# Patient Record
Sex: FEMALE | Race: WHITE | Employment: FULL TIME | URBAN - METROPOLITAN AREA
[De-identification: names, ages, dates, MRNs, and addresses within clinical notes are randomized per-mention and may not be internally consistent; named-entity substitution may affect disease eponyms.]

---

## 2017-01-03 ENCOUNTER — MYC REFILL (OUTPATIENT)
Dept: FAMILY MEDICINE | Facility: OTHER | Age: 37
End: 2017-01-03

## 2017-01-03 DIAGNOSIS — F90.2 ATTENTION DEFICIT HYPERACTIVITY DISORDER (ADHD), COMBINED TYPE: ICD-10-CM

## 2017-01-03 RX ORDER — DEXTROAMPHETAMINE SACCHARATE, AMPHETAMINE ASPARTATE MONOHYDRATE, DEXTROAMPHETAMINE SULFATE AND AMPHETAMINE SULFATE 7.5; 7.5; 7.5; 7.5 MG/1; MG/1; MG/1; MG/1
30 CAPSULE, EXTENDED RELEASE ORAL DAILY
Qty: 30 CAPSULE | Refills: 0 | Status: SHIPPED | OUTPATIENT
Start: 2017-01-03 | End: 2017-02-09

## 2017-01-03 NOTE — TELEPHONE ENCOUNTER
Message from [place]:  Original authorizing provider: MD Jimena Beard would like a refill of the following medications:  amphetamine-dextroamphetamine (ADDERALL XR) 30 MG per capsule [Ramon Raphael MD]    Preferred pharmacy: [facility] - [facility] [address]    Comment:

## 2017-01-03 NOTE — TELEPHONE ENCOUNTER
Adderall XR      Last Written Prescription Date:  11/08/2016  Last Fill Quantity: 30,   # refills: 0  Last [facility] Visit with [facility] provider: 08/08/2016  Future Office visit:       Routing refill request to provider for review/approval because:  Drug not on the [facility] or  [facility] refill protocol or controlled substance    Luz Maria Sosa MA

## 2017-01-10 ENCOUNTER — TELEPHONE (OUTPATIENT)
Dept: FAMILY MEDICINE | Facility: OTHER | Age: 37
End: 2017-01-10

## 2017-01-10 NOTE — TELEPHONE ENCOUNTER
Please change medication or advise of any information that might help get this approved.    Thanks    Received PA request for: adderalNativeAD  Insurance co: [employer] phone: [phone] option 3  Member ID#: [id number]    PA filled out and faxed awaiting approval/denial

## 2017-02-09 ENCOUNTER — OFFICE VISIT (OUTPATIENT)
Dept: FAMILY MEDICINE | Facility: OTHER | Age: 37
End: 2017-02-09
Payer: COMMERCIAL

## 2017-02-09 VITALS
DIASTOLIC BLOOD PRESSURE: 70 MMHG | TEMPERATURE: 97.8 F | SYSTOLIC BLOOD PRESSURE: 116 MMHG | HEART RATE: 106 BPM | HEIGHT: 65 IN | RESPIRATION RATE: 20 BRPM | OXYGEN SATURATION: 100 % | WEIGHT: 185.4 LBS | BODY MASS INDEX: 30.89 KG/M2

## 2017-02-09 DIAGNOSIS — F41.1 GENERALIZED ANXIETY DISORDER: ICD-10-CM

## 2017-02-09 DIAGNOSIS — F41.9 ANXIETY: ICD-10-CM

## 2017-02-09 DIAGNOSIS — F90.2 ATTENTION DEFICIT HYPERACTIVITY DISORDER (ADHD), COMBINED TYPE: Primary | ICD-10-CM

## 2017-02-09 PROCEDURE — 99213 OFFICE O/P EST LOW 20 MIN: CPT | Performed by: NURSE PRACTITIONER

## 2017-02-09 RX ORDER — DEXTROAMPHETAMINE SACCHARATE, AMPHETAMINE ASPARTATE MONOHYDRATE, DEXTROAMPHETAMINE SULFATE AND AMPHETAMINE SULFATE 7.5; 7.5; 7.5; 7.5 MG/1; MG/1; MG/1; MG/1
30 CAPSULE, EXTENDED RELEASE ORAL DAILY
Qty: 30 CAPSULE | Refills: 0 | Status: SHIPPED | OUTPATIENT
Start: 2017-02-09 | End: 2017-03-20

## 2017-02-09 RX ORDER — ALPRAZOLAM 0.5 MG
0.5 TABLET ORAL PRN
Qty: 30 TABLET | Refills: 0 | Status: SHIPPED | OUTPATIENT
Start: 2017-02-09 | End: 2017-08-16

## 2017-02-09 NOTE — PROGRESS NOTES
SUBJECTIVE:                                                    Jimena Moreira is a 36 year old female who presents to clinic today for the following health issues:      Medication Followup of Adderall and Xanax    Taking Medication as prescribed: yes    Side Effects:  None    Medication Helping Symptoms:  yes     ADHD follow up.  Normally sees Dr. Raphael, but he is out this month.  Dose has been stable.  Symptoms well controlled.  She uses the Xanax very rarely, last filled in April of last year.    Problem list and histories reviewed & adjusted, as indicated.  Additional history: none    Patient Active Problem List   Diagnosis     CARDIOVASCULAR SCREENING; LDL GOAL LESS THAN 160     SLE (systemic lupus erythematosus) (H)     Sicca (H)     High risk medication use     Anxiety     Generalized anxiety disorder     Difficulty concentrating     Attention deficit     Generalized anxiety disorder     Attention deficit hyperactivity disorder (ADHD)     Past Surgical History   Procedure Laterality Date     Cholecystectomy       Biopsy         Social History   Substance Use Topics     Smoking status: Former Smoker     Quit date: 2013     Smokeless tobacco: Never Used      Comment: social     Alcohol Use: Yes      Comment: occ     Family History   Problem Relation Age of Onset     Hypertension Father      CANCER Paternal Aunt      Cardiovascular Paternal Uncle      CHF,  in sleep     Hypertension Brother      hypertension, Etoh use     DIABETES Paternal Grandmother      Hyperlipidemia Brother      Breast Cancer Mother      Other Cancer Maternal Grandfather 87     Lymphoma         Current Outpatient Prescriptions   Medication Sig Dispense Refill     amphetamine-dextroamphetamine (ADDERALL XR) 30 MG per 24 hr capsule Take 1 capsule (30 mg) by mouth daily 30 capsule 0     venlafaxine (EFFEXOR-XR) 75 MG 24 hr capsule TAKE THREE CAPSULES BY MOUTH EVERY  capsule 2     ALPRAZolam (XANAX) 0.5 MG tablet Take 1  "tablet (0.5 mg) by mouth as needed for anxiety 1-2 tabs as needed 30 tablet 0     etonogestrel-ethinyl estradiol (NUVARING) 0.12-0.015 MG/24HR vaginal ring Place 1 each vaginally every 28 days 3 each 2     omeprazole 20 MG tablet Take 1 tablet (20 mg) by mouth daily Take 30-60 minutes before a meal. 90 tablet 1     hydrOXYzine (ATARAX) 25 MG tablet Take 1-2 tablets (25-50 mg) by mouth every 6 hours as needed for anxiety 60 tablet 1     ibuprofen (ADVIL,MOTRIN) 800 MG tablet Take 1 tablet (800 mg) by mouth every 8 hours as needed for pain 90 tablet 1     hydroxychloroquine (PLAQUENIL) 200 MG tablet Take 1 tablet (200 mg) by mouth 2 times daily 60 tablet 6     [DISCONTINUED] amphetamine-dextroamphetamine (ADDERALL XR) 30 MG per capsule Take 1 capsule (30 mg) by mouth daily 30 capsule 0     [DISCONTINUED] amphetamine-dextroamphetamine (ADDERALL XR) 30 MG per capsule Take 1 capsule (30 mg) by mouth daily 30 capsule 0     Allergies   Allergen Reactions     Zoloft [Sertraline] Anxiety     Cliches jaw, Increased anxiety     BP Readings from Last 3 Encounters:   02/09/17 116/70   08/08/16 110/70   04/05/16 110/70    Wt Readings from Last 3 Encounters:   02/09/17 185 lb 6.4 oz (84.097 kg)   08/08/16 175 lb 9.6 oz (79.652 kg)   04/05/16 167 lb 12.8 oz (76.114 kg)           ROS:  C: NEGATIVE for fever, chills, change in weight  E/M: NEGATIVE for ear, mouth and throat problems  R: NEGATIVE for significant cough or SOB  CV: NEGATIVE for chest pain, palpitations or peripheral edema    OBJECTIVE:                                                    /70 mmHg  Pulse 106  Temp(Src) 97.8  F (36.6  C) (Tympanic)  Resp 20  Ht 5' 4.5\" (1.638 m)  Wt 185 lb 6.4 oz (84.097 kg)  BMI 31.34 kg/m2  SpO2 100%  LMP 01/02/2017  Body mass index is 31.34 kg/(m^2).  GENERAL: healthy, alert and no distress  NECK: no adenopathy, no asymmetry, masses, or scars and thyroid normal to palpation  RESP: lungs clear to auscultation - no rales, " rhonchi or wheezes  CV: regular rate and rhythm, normal S1 S2, no S3 or S4, no murmur, click or rub, no peripheral edema and peripheral pulses strong  MS: no gross musculoskeletal defects noted, no edema    Diagnostic Test Results:  none      ASSESSMENT/PLAN:                                                        1. Attention deficit hyperactivity disorder (ADHD), combined type  Further refills from PCP.   - amphetamine-dextroamphetamine (ADDERALL XR) 30 MG per 24 hr capsule; Take 1 capsule (30 mg) by mouth daily  Dispense: 30 capsule; Refill: 0    2. Generalized anxiety disorder  3. Anxiety  - ALPRAZolam (XANAX) 0.5 MG tablet; Take 1 tablet (0.5 mg) by mouth as needed for anxiety 1-2 tabs as needed  Dispense: 30 tablet; Refill: 0    FUTURE APPOINTMENTS:       - Follow-up visit in 1 month with PCP.   See Patient Instructions    [facility]

## 2017-02-09 NOTE — NURSING NOTE
"Chief Complaint   Patient presents with     Recheck Medication     refills needed of adderall and xanax       Initial /70 mmHg  Pulse 106  Temp(Src) 97.8  F (36.6  C) (Tympanic)  Resp 20  Ht 5' 4.5\" (1.638 m)  Wt 185 lb 6.4 oz (84.097 kg)  BMI 31.34 kg/m2  SpO2 100%  LMP 01/02/2017 Estimated body mass index is 31.34 kg/(m^2) as calculated from the following:    Height as of this encounter: 5' 4.5\" (1.638 m).    Weight as of this encounter: 185 lb 6.4 oz (84.097 kg).  Medication Reconciliation: complete   ................César Granados LPN,   February 9, 2017,      8:02 AM,   [facility]      "

## 2017-02-09 NOTE — PATIENT INSTRUCTIONS
I refilled your Adderall and Xanax.      Follow up with Dr. Raphael next month for further refills.

## 2017-02-09 NOTE — MR AVS SNAPSHOT
After Visit Summary   2/9/2017    Jimena Moreira    MRN: [id number]           Patient Information     Date Of Birth          1980        Visit Information        Provider Department      2/9/2017 7:40 AM Zofia Bucio APRN [facility]        Today's Diagnoses     Attention deficit hyperactivity disorder (ADHD), combined type    -  1     Generalized anxiety disorder         Anxiety           Care Instructions    I refilled your Adderall and Xanax.      Follow up with Dr. Raphael next month for further refills.               Follow-ups after your visit        Who to contact     If you have questions or need follow up information about today's clinic visit or your schedule please contact [facility] directly at [phone].  Normal or non-critical lab and imaging results will be communicated to you by MyChart, letter or phone within 4 business days after the clinic has received the results. If you do not hear from us within 7 days, please contact the clinic through [employer] or phone. If you have a critical or abnormal lab result, we will notify you by phone as soon as possible.  Submit refill requests through [employer] or call your pharmacy and they will forward the refill request to us. Please allow 3 business days for your refill to be completed.          Additional Information About Your Visit        MyChart Information     [employer] gives you secure access to your electronic health record. If you see a primary care provider, you can also send messages to your care team and make appointments. If you have questions, please call your primary care clinic.  If you do not have a primary care provider, please call [phone] and they will assist you.        Care EveryWhere ID     This is your Care EveryWhere ID. This could be used by other organizations to access your [place] medical records  [id number]        Your Vitals Were     Pulse Temperature Respirations    106  "97.8  F (36.6  C) (Tympanic) 20    Height BMI (Body Mass Index) Pulse Oximetry    5' 4.5\" (1.638 m) 31.34 kg/m2 100%    Last Period          01/02/2017         Blood Pressure from Last 3 Encounters:   02/09/17 116/70   08/08/16 110/70   04/05/16 110/70    Weight from Last 3 Encounters:   02/09/17 185 lb 6.4 oz (84.097 kg)   08/08/16 175 lb 9.6 oz (79.652 kg)   04/05/16 167 lb 12.8 oz (76.114 kg)              Today, you had the following     No orders found for display         Where to get your medicines      Some of these will need a paper prescription and others can be bought over the counter.  Ask your nurse if you have questions.     Bring a paper prescription for each of these medications    - ALPRAZolam 0.5 MG tablet  - amphetamine-dextroamphetamine 30 MG per 24 hr capsule       Primary Care Provider Office Phone # Fax #    Ramon Raphael [phone]       [facility] [address] [address]        Thank you!     Thank you for choosing [facility]  for your care. Our goal is always to provide you with excellent care. Hearing back from our patients is one way we can continue to improve our services. Please take a few minutes to complete the written survey that you may receive in the mail after your visit with us. Thank you!             Your Updated Medication List - Protect others around you: Learn how to safely use, store and throw away your medicines at www.disposemymeds.org.          This list is accurate as of: 2/9/17  8:09 AM.  Always use your most recent med list.                   Brand Name Dispense Instructions for use    ALPRAZolam 0.5 MG tablet    XANAX    30 tablet    Take 1 tablet (0.5 mg) by mouth as needed for anxiety 1-2 tabs as needed       amphetamine-dextroamphetamine 30 MG per 24 hr capsule    ADDERALL XR    30 capsule    Take 1 capsule (30 mg) by mouth daily       etonogestrel-ethinyl estradiol 0.12-0.015 MG/24HR vaginal ring    NUVARING "    3 each    Place 1 each vaginally every 28 days       hydroxychloroquine 200 MG tablet    PLAQUENIL    60 tablet    Take 1 tablet (200 mg) by mouth 2 times daily       hydrOXYzine 25 MG tablet    ATARAX    60 tablet    Take 1-2 tablets (25-50 mg) by mouth every 6 hours as needed for anxiety       ibuprofen 800 MG tablet    ADVIL/MOTRIN    90 tablet    Take 1 tablet (800 mg) by mouth every 8 hours as needed for pain       omeprazole 20 MG tablet     90 tablet    Take 1 tablet (20 mg) by mouth daily Take 30-60 minutes before a meal.       venlafaxine 75 MG 24 hr capsule    EFFEXOR-XR    270 capsule    TAKE THREE CAPSULES BY MOUTH EVERY DAY

## 2017-03-20 ENCOUNTER — MYC MEDICAL ADVICE (OUTPATIENT)
Dept: FAMILY MEDICINE | Facility: OTHER | Age: 37
End: 2017-03-20

## 2017-03-20 DIAGNOSIS — F90.2 ATTENTION DEFICIT HYPERACTIVITY DISORDER (ADHD), COMBINED TYPE: ICD-10-CM

## 2017-03-20 RX ORDER — DEXTROAMPHETAMINE SACCHARATE, AMPHETAMINE ASPARTATE MONOHYDRATE, DEXTROAMPHETAMINE SULFATE AND AMPHETAMINE SULFATE 7.5; 7.5; 7.5; 7.5 MG/1; MG/1; MG/1; MG/1
30 CAPSULE, EXTENDED RELEASE ORAL DAILY
Qty: 30 CAPSULE | Refills: 0 | Status: SHIPPED | OUTPATIENT
Start: 2017-03-20 | End: 2017-05-02

## 2017-03-20 NOTE — TELEPHONE ENCOUNTER
Tried calling, no answer. Will mail Rx to pharmacy. Informed pt via [employer].  ................César Granados LPN,   March 20, 2017,      4:08 PM,   [facility]

## 2017-03-20 NOTE — TELEPHONE ENCOUNTER
adderall      Last Written Prescription Date:  2/9/17  Last Fill Quantity: 30,   # refills: 0  [facility]: 2/9/17  Future Office visit:       Routing refill request to provider for review/approval because:  Drug not on the [facility] refill [facility] or controlled substance  Luiza Coulter MA     3/20/2017

## 2017-03-20 NOTE — TELEPHONE ENCOUNTER
Since I just saw her last month, I will just refill this x 1.  Please make an appointment with Dr. Raphael as soon as you can.  Please annabelle up refill request for me.     Thanks.    Electronically signed by Zofia Bucio CNP.

## 2017-04-28 NOTE — PROGRESS NOTES
SUBJECTIVE:                                                    Jimena Moreira is a 37 year old female who presents to clinic today for the following health issues:      SUBJECTIVE:  Patient is here today to recheck ADHD/ADD.    Updates since last visit: Rash ongoing, Was told it was psoriasis,   Routine for taking medicine, including time: 6AM  Time medicine wears off: 3 maybe  Issues at school/Work: None  Issues at home: none  Control of symptoms: Good     Side effects:  Headaches: No  Stomach aches: No  Irritability/mood swings: No  Difficulties with sleep: No  Social withdrawal: No  Unusual movements/tics: No  Decreased appetite: No    Other concerns: Rash        Amount of exercise or physical activity: 4-5 days/week for an average of 30-45 minutes    Problems taking medications regularly: No    Medication side effects: none    Diet: regular (no restrictions)      Continues to have rash was told it was psoriasis and is looking for options.       PROBLEMS TO ADD ON...    Problem list and histories reviewed & adjusted, as indicated.  Additional history: as documented    Current Outpatient Prescriptions   Medication Sig Dispense Refill     triamcinolone (KENALOG) 0.1 % cream Apply sparingly to affected area two times daily as needed 453.6 g 0     omeprazole 20 MG tablet Take 1 tablet (20 mg) by mouth daily Take 30-60 minutes before a meal. 90 tablet 1     [START ON 7/2/2017] amphetamine-dextroamphetamine (ADDERALL XR) 30 MG per 24 hr capsule Take 1 capsule (30 mg) by mouth daily 30 capsule 0     venlafaxine (EFFEXOR-XR) 75 MG 24 hr capsule TAKE THREE CAPSULES BY MOUTH EVERY  capsule 2     ALPRAZolam (XANAX) 0.5 MG tablet Take 1 tablet (0.5 mg) by mouth as needed for anxiety 1-2 tabs as needed 30 tablet 0     etonogestrel-ethinyl estradiol (NUVARING) 0.12-0.015 MG/24HR vaginal ring Place 1 each vaginally every 28 days 3 each 2     hydrOXYzine (ATARAX) 25 MG tablet Take 1-2 tablets (25-50 mg) by mouth every  6 hours as needed for anxiety 60 tablet 1     ibuprofen (ADVIL,MOTRIN) 800 MG tablet Take 1 tablet (800 mg) by mouth every 8 hours as needed for pain 90 tablet 1     hydroxychloroquine (PLAQUENIL) 200 MG tablet Take 1 tablet (200 mg) by mouth 2 times daily 60 tablet 6     [DISCONTINUED] amphetamine-dextroamphetamine (ADDERALL XR) 30 MG per 24 hr capsule Take 1 capsule (30 mg) by mouth daily 30 capsule 0     [DISCONTINUED] amphetamine-dextroamphetamine (ADDERALL XR) 30 MG per 24 hr capsule Take 1 capsule (30 mg) by mouth daily 30 capsule 0     [DISCONTINUED] amphetamine-dextroamphetamine (ADDERALL XR) 30 MG per 24 hr capsule Take 1 capsule (30 mg) by mouth daily 30 capsule 0     [DISCONTINUED] venlafaxine (EFFEXOR-XR) 75 MG 24 hr capsule TAKE THREE CAPSULES BY MOUTH EVERY  capsule 2     Allergies   Allergen Reactions     No Clinical Screening - See Comments Dermatitis     Zoloft [Sertraline] Anxiety     Cliches jaw, Increased anxiety       Reviewed and updated as needed this visit by clinical staff       Reviewed and updated as needed this visit by Provider         ROS:  Constitutional, HEENT, cardiovascular, pulmonary, gi and gu systems are negative, except as otherwise noted.    OBJECTIVE:                                                    /62 (Cuff Size: Adult Regular)  Pulse 86  Temp 98.5  F (36.9  C) (Temporal)  Resp 18  Wt 182 lb (82.6 kg)  BMI 30.76 kg/m2  Body mass index is 30.76 kg/(m^2).  GENERAL: healthy, alert and no distress  SKIN: patient showed picture of her Rash,     Examination of the rash reveals:glut fold b/l, posteriorly, well-defined dry violaceous plaques.    NEURO: Normal strength and tone, mentation intact and speech normal  PSYCH: mentation appears normal, affect normal/bright    Care everywhere seen, request patient to have results faxed for scanning.    Skin Biopsy:    Comment:  [facility]  Surgical Pathology Laboratory  [facility] [place]   [address]  Tel: [phone]  Fax:  [phone]    SURGICAL PATHOLOGY REPORT    Patient Name: CATY CHERY Specimen No: [id number] Room No:  OP ERP Age:  36 Sex: F Taken: 2016 Med. Rec. #: [id number] :  1980 Received: 2016 Physician(s): Buck Dash PA-C   Service:  Reported: 2016   Encounter No: [id number]       FINAL DIAGNOSIS    Leg, biopsy - Psoriasiform dermatitis with spongiosis and  eosinophils (see description).                                                                                                                                                                                                               Electronically Signed Out   Tahira Vale MD   amp    ____________________________________________      Gross  Leg:  A 0.6 cm in diameter tan-pink grossly unremarkable skin  punch, excised to a depth of 0.4 cm.  The resection margin is  inked black, specimen is bisected.  IT-1  (JR)     MICROSCOPIC  Sections show skin with psoriasiform hyperplasia, focal  parakeratosis with serous crust and spongiosis associated with  lymphocytes and neutrophils.  There are also pockets of  neutrophils in the upper epidermis and keratin layer.  Within the  dermis there is perivascular chronic inflammation consisting of  lymphocytes with a few eosinophils and rare plasma cells.  The  perivascular inflammation is superficial and deep.  There is no  vasculitis.  The findings are consistent with subacute dermatitis  with a differential diagnosis of chronic atopic dermatitis,  chronic allergic contact dermatitis and psoriasis. Given the  patient' s complicating history of lupus and skin lesions  refractory to treatment, the case is sent in consultation to the  [facility] dermatopathology group for a more specific diagnosis.   Their diagnosis is psoriasiform hyperplasia, spongiform pustule,  hypogranulosis and suprapapillary thinning most consistent  with  psoriasis.  Clinical correlation is recommended.       CPT CODES  A: [id number]    Copy from care everywhere, waiting for results to be faxed.       ASSESSMENT/PLAN:                                                        ICD-10-CM    1. Psoriasis L40.9 triamcinolone (KENALOG) 0.1 % cream     DERMATOLOGY REFERRAL   2. Gastroesophageal reflux disease without esophagitis K21.9 omeprazole 20 MG tablet   3. Attention deficit hyperactivity disorder (ADHD), combined type F90.2 amphetamine-dextroamphetamine (ADDERALL XR) 30 MG per 24 hr capsule     DISCONTINUED: amphetamine-dextroamphetamine (ADDERALL XR) 30 MG per 24 hr capsule     DISCONTINUED: amphetamine-dextroamphetamine (ADDERALL XR) 30 MG per 24 hr capsule   4. Anxiety F41.9 venlafaxine (EFFEXOR-XR) 75 MG 24 hr capsule   5. History of systemic lupus erythematosus Z87.39 DERMATOLOGY REFERRAL     Orders Placed This Encounter   Procedures     DERMATOLOGY REFERRAL     Important for patient to follow up with dermatologist with current hx of SLE and seeing Rheumatologist.  The patient understood the rational for the diagnosis and treatment plan. All questions were answered to best of my ability and the patient's satisfaction.  I have reviewed all pertinent investigations including labs and imaging including outside records if relevent.  Risks, benefits and alternatives of treatments discussed. Plan agreed on.  Followup: if not better.  Will call, return to clinic, or go to ED if worsening or symptoms not improving as discussed.  See patient instructions.     Patient Instructions       What is Psoriasis?  Psoriasis is a chronic skin disease. Psoriasis can begin at any age. It is most common between ages 30 and 39 and also between ages 50 and 69. Psoriasis affects nearly equal numbers of men and women. In people with this disease, the skin grows too fast. Dead skin cells build up on the skin s surface to form inflamed, thick, silvery scales called plaques. Sometimes people  form many small lesions that can hurt or have pus in them. Psoriasis does not spread from person to person.     About your symptoms  Psoriasis plaques tend to form on the elbows, knees, scalp, navel, arms, legs, and the penis or vulva. They can be unsightly, painful, and itchy. Plaques on the joints can limit movement. On the fingernails or toenails, psoriasis can cause pitting, a change in nail color, and a change in nail shape. In some cases, psoriasis also causes symptoms that are like arthritis. Symptoms may come and go on their own. Factors such as stress, infection, and certain medications may cause flare-ups. If symptoms bother you, know that many effective medical treatments exist  that can help relieve symptoms for most people with psoriasis. Discuss your treatment options with your health care provider.  External medical treatments  There are many types of external medical treatments that are used to treat the affected skin lesions. Your health care provider may prescribe one of many types of topical medications. These include strong topical steroids or steroid-sparing agents. You apply them to your skin on a regular basis. Coal tar (a thick black liquid) may be applied to thick plaques. In some cases, the skin may be exposed to a special light in the health care provider's office. Or, you can expose the psoriatic plaques to short periods (5 minutes) of natural sun as directed by your health care provider. This method is called phototherapy. It can be enhanced with the use of psoralen (a type of medication).  Internal medical treatments  Internal treatments are taken orally (by mouth) or given by injection. There are a number of oral medications for more severe psoriasis. Your health care provider can tell you more about these treatments.    5144-9413 The [employer]. [address] [place] [address], [place] [address]. All rights reserved. This information is not intended as a substitute for professional  medical care. Always follow your healthcare professional's instructions.        Managing Psoriasis  The success of your medical treatment depends on you. When your health care provider gives you a treatment plan, ask when you should expect to see results. Then, follow your plan. If your treatment does not work in the expected time, let your health care provider know. Psoriasis is a common disease, and it can respond to many different treatments. It depends on the location, size, and symptoms each person experiences. Some treatments are simple (tar-based therapies or topical steroids), and some are complex (new biologic medications or light therapy). Your health care provider will need to personalize your treatment. Psoriasis frequently will get better with treatment, but later worsen if you stop treatment or if a new illness ocurs. In most cases, you can get control of your psoriasis again. You will likely need to see your health care provider regularly about treatment options.     Take baths in warm water to help soften scales.   Psoriasis self-care  Follow these steps to help manage your symptoms:    Take baths to help soften scales. Use warm, not hot, water. To avoid drying out your skin, limit each bath to about 15 minutes. Add bath oil or [place] salts.    After you bathe, apply lotion right away, while your skin is damp. Dry skin can make symptoms worse.    Use a scalp treatment as prescribed by your health care provider. There are different solutions and dosages based on your symptoms.     Promptly seek treatment for any skin injuries because they can cause flare-ups.    Manage your stress, and use relaxation techniques.    Expose your psoriatic skin to sunlight for 5 minutes a day, except if you feel that sun exposure makes your psoriasis worse. Use sunscreen on the normal, unaffected skin, but avoid sunburns.    Use over-the-counter hydrocortisone cream for itching to reduce scaling for active  outbreaks. Consult with your health care provider for long-term use.    Stick with treatment that your health care provider has recommended for you, especially if it's controlling your psoriasis.    Avoid abrasive cleansers, harsh detergents, and household chemicals.  Getting good results  Now that you know more about psoriasis, the next step is up to you. Follow your health care provider's treatment plan and self-care routine. Doing so can help you control your symptoms. If your symptoms don t improve or they get worse, call your health care provider. Psoriasis can t be cured. But its symptoms can be managed.     0666-4338 [employer]. [address] [place] [address]. All rights reserved. This information is not intended as a substitute for professional medical care. Always follow your healthcare professional's instructions.        Psoriasis  Psoriasis is an inflammatory condition that affects the skin and nails. You may have patches of thick, red skin (plaques) covered with silvery scales. These usually appear on the elbows, knees, legs, lower back, and scalp.  The plaques itch and can be painful. There may also be psychological and emotional distress.  Psoriasis is not contagious but can be inherited. The latest research shows that this may be an immune disorder. The immune system reacts to healthy skin like it is a foreign substance. This causes skin cells to grow faster than normal and to stack up in raised red patches. The disease is chronic with periods of flares and remissions.  Smoking, sun exposure, and alcohol use may affect how often the psoriasis occurs and how long the flares last.  There is no cure, but treatments can offer relief. Treatment consists of a combination of topical creams, light therapy (phototherapy), and oral medicines.  Home care  The following guidelines can help you care for yourself at home:    No specific diet is required. Eat a healthy, well-balanced diet  that includes fresh fruits and vegetables, whole grains, and lean meats. Psoriasis can increase the risk for diabetes and heart disease.    Increasing omega-3 fatty acids in your diet can help improve dry skin. The best dietary sources are fatty fish (salmon, mackerel, lake trout, albacore tuna) or fish oil (such as cod liver oil). A great way to take fish oil is to add it to a juice, shake, or smoothie. Flaxseeds and flaxseed oil, canola oil, walnuts, soybean, and tofu are converted to omega-3 fatty acid in the body.    Maintain a healthy weight. Overlapping skin folds can be a site for psoriasis plaques. If you are overweight, talk to your doctor about a weight-loss program.    Bathing daily can help remove scales and calm inflamed skin. Use lukewarm water and mild soaps that have added oils, fats, and moisturizers. Avoid deodorants, antiperspirants, and antibacterial soaps, as these have a drying effect. Many find that soaking in a tub with added bath oils, oatmeal, apple cider vinegar, or Epsom salts is helpful.    After bathing, put on moisturizing cream (or a skin oil for a stronger effect).    Some exposure to UV rays from the sun can improve psoriasis, but too much can trigger an outbreak. It also increases your risk for skin cancer. Limit sun exposure and use sunscreen on healthy skin (at least 15 SPF).    If you are prescribed medication, take it as directed.    Unless another steroid cream was prescribed, you may use over-the-counter hydrocortisone cream for a few weeks during symptom flare-ups.    Stop smoking. If you are a long-time smoker, this can be hard. Consider enrolling in a stop-smoking program.    Tell your doctor if your joints start to ache or get stiff.    Tell your doctor if you notice changes in your fingernails.    Depression is more common among psoriasis patients. Get help if you notice changes in your mood.  Follow-up care  Follow up with your doctor or as advised by our staff.  When  to seek medical care  Get prompt medical attention if any of the following occur:    Skin pain gets worse    Bleeding from the skin plaques that is hard to control    Signs of skin infection (redness, increasing pain, swelling, pus)    Fever of 100.4 F (38 C) or higher, or as directed by your health care provider    1429-5394 [employer]. [address] [place] [address]. All rights reserved. This information is not intended as a substitute for professional medical care. Always follow your healthcare professional's instructions.            Ramon Raphale MD  [facility]

## 2017-05-02 ENCOUNTER — OFFICE VISIT (OUTPATIENT)
Dept: FAMILY MEDICINE | Facility: OTHER | Age: 37
End: 2017-05-02
Payer: COMMERCIAL

## 2017-05-02 ENCOUNTER — TELEPHONE (OUTPATIENT)
Dept: FAMILY MEDICINE | Facility: OTHER | Age: 37
End: 2017-05-02

## 2017-05-02 VITALS
BODY MASS INDEX: 30.76 KG/M2 | SYSTOLIC BLOOD PRESSURE: 100 MMHG | TEMPERATURE: 98.5 F | DIASTOLIC BLOOD PRESSURE: 62 MMHG | WEIGHT: 182 LBS | RESPIRATION RATE: 18 BRPM | HEART RATE: 86 BPM

## 2017-05-02 DIAGNOSIS — F41.9 ANXIETY: ICD-10-CM

## 2017-05-02 DIAGNOSIS — F90.2 ATTENTION DEFICIT HYPERACTIVITY DISORDER (ADHD), COMBINED TYPE: ICD-10-CM

## 2017-05-02 DIAGNOSIS — M32.9 HISTORY OF SYSTEMIC LUPUS ERYTHEMATOSUS (H): ICD-10-CM

## 2017-05-02 DIAGNOSIS — L40.9 PSORIASIS: Primary | ICD-10-CM

## 2017-05-02 DIAGNOSIS — K21.9 GASTROESOPHAGEAL REFLUX DISEASE WITHOUT ESOPHAGITIS: ICD-10-CM

## 2017-05-02 PROCEDURE — 99213 OFFICE O/P EST LOW 20 MIN: CPT | Performed by: FAMILY MEDICINE

## 2017-05-02 RX ORDER — DEXTROAMPHETAMINE SACCHARATE, AMPHETAMINE ASPARTATE MONOHYDRATE, DEXTROAMPHETAMINE SULFATE AND AMPHETAMINE SULFATE 7.5; 7.5; 7.5; 7.5 MG/1; MG/1; MG/1; MG/1
30 CAPSULE, EXTENDED RELEASE ORAL DAILY
Qty: 30 CAPSULE | Refills: 0 | Status: SHIPPED | OUTPATIENT
Start: 2017-06-02 | End: 2017-05-02

## 2017-05-02 RX ORDER — VENLAFAXINE HYDROCHLORIDE 75 MG/1
CAPSULE, EXTENDED RELEASE ORAL
Qty: 270 CAPSULE | Refills: 2 | Status: SHIPPED | OUTPATIENT
Start: 2017-05-02 | End: 2018-05-21

## 2017-05-02 RX ORDER — NICOTINE POLACRILEX 4 MG/1
20 GUM, CHEWING ORAL DAILY
Qty: 90 TABLET | Refills: 1 | Status: SHIPPED | OUTPATIENT
Start: 2017-05-02 | End: 2017-08-25

## 2017-05-02 RX ORDER — DEXTROAMPHETAMINE SACCHARATE, AMPHETAMINE ASPARTATE MONOHYDRATE, DEXTROAMPHETAMINE SULFATE AND AMPHETAMINE SULFATE 7.5; 7.5; 7.5; 7.5 MG/1; MG/1; MG/1; MG/1
30 CAPSULE, EXTENDED RELEASE ORAL DAILY
Qty: 30 CAPSULE | Refills: 0 | Status: SHIPPED | OUTPATIENT
Start: 2017-05-02 | End: 2017-05-02

## 2017-05-02 RX ORDER — DEXTROAMPHETAMINE SACCHARATE, AMPHETAMINE ASPARTATE MONOHYDRATE, DEXTROAMPHETAMINE SULFATE AND AMPHETAMINE SULFATE 7.5; 7.5; 7.5; 7.5 MG/1; MG/1; MG/1; MG/1
30 CAPSULE, EXTENDED RELEASE ORAL DAILY
Qty: 30 CAPSULE | Refills: 0 | Status: SHIPPED | OUTPATIENT
Start: 2017-07-02 | End: 2017-08-25

## 2017-05-02 RX ORDER — TRIAMCINOLONE ACETONIDE 1 MG/G
CREAM TOPICAL
Qty: 453.6 G | Refills: 0 | Status: SHIPPED | OUTPATIENT
Start: 2017-05-02 | End: 2019-09-23

## 2017-05-02 ASSESSMENT — PAIN SCALES - GENERAL: PAINLEVEL: NO PAIN (0)

## 2017-05-02 NOTE — PATIENT INSTRUCTIONS
What is Psoriasis?  Psoriasis is a chronic skin disease. Psoriasis can begin at any age. It is most common between ages 30 and 39 and also between ages 50 and 69. Psoriasis affects nearly equal numbers of men and women. In people with this disease, the skin grows too fast. Dead skin cells build up on the skin s surface to form inflamed, thick, silvery scales called plaques. Sometimes people form many small lesions that can hurt or have pus in them. Psoriasis does not spread from person to person.     About your symptoms  Psoriasis plaques tend to form on the elbows, knees, scalp, navel, arms, legs, and the penis or vulva. They can be unsightly, painful, and itchy. Plaques on the joints can limit movement. On the fingernails or toenails, psoriasis can cause pitting, a change in nail color, and a change in nail shape. In some cases, psoriasis also causes symptoms that are like arthritis. Symptoms may come and go on their own. Factors such as stress, infection, and certain medications may cause flare-ups. If symptoms bother you, know that many effective medical treatments exist  that can help relieve symptoms for most people with psoriasis. Discuss your treatment options with your health care provider.  External medical treatments  There are many types of external medical treatments that are used to treat the affected skin lesions. Your health care provider may prescribe one of many types of topical medications. These include strong topical steroids or steroid-sparing agents. You apply them to your skin on a regular basis. Coal tar (a thick black liquid) may be applied to thick plaques. In some cases, the skin may be exposed to a special light in the health care provider's office. Or, you can expose the psoriatic plaques to short periods (5 minutes) of natural sun as directed by your health care provider. This method is called phototherapy. It can be enhanced with the use of psoralen (a type of medication).  Internal  medical treatments  Internal treatments are taken orally (by mouth) or given by injection. There are a number of oral medications for more severe psoriasis. Your health care provider can tell you more about these treatments.    7381-3339 [employer]. [address] [place] [address], [place] [address]. All rights reserved. This information is not intended as a substitute for professional medical care. Always follow your healthcare professional's instructions.        Managing Psoriasis  The success of your medical treatment depends on you. When your health care provider gives you a treatment plan, ask when you should expect to see results. Then, follow your plan. If your treatment does not work in the expected time, let your health care provider know. Psoriasis is a common disease, and it can respond to many different treatments. It depends on the location, size, and symptoms each person experiences. Some treatments are simple (tar-based therapies or topical steroids), and some are complex (new biologic medications or light therapy). Your health care provider will need to personalize your treatment. Psoriasis frequently will get better with treatment, but later worsen if you stop treatment or if a new illness ocurs. In most cases, you can get control of your psoriasis again. You will likely need to see your health care provider regularly about treatment options.     Take baths in warm water to help soften scales.   Psoriasis self-care  Follow these steps to help manage your symptoms:    Take baths to help soften scales. Use warm, not hot, water. To avoid drying out your skin, limit each bath to about 15 minutes. Add bath oil or [place] Sea salts.    After you bathe, apply lotion right away, while your skin is damp. Dry skin can make symptoms worse.    Use a scalp treatment as prescribed by your health care provider. There are different solutions and dosages based on your symptoms.     Promptly seek treatment for any  skin injuries because they can cause flare-ups.    Manage your stress, and use relaxation techniques.    Expose your psoriatic skin to sunlight for 5 minutes a day, except if you feel that sun exposure makes your psoriasis worse. Use sunscreen on the normal, unaffected skin, but avoid sunburns.    Use over-the-counter hydrocortisone cream for itching to reduce scaling for active outbreaks. Consult with your health care provider for long-term use.    Stick with treatment that your health care provider has recommended for you, especially if it's controlling your psoriasis.    Avoid abrasive cleansers, harsh detergents, and household chemicals.  Getting good results  Now that you know more about psoriasis, the next step is up to you. Follow your health care provider's treatment plan and self-care routine. Doing so can help you control your symptoms. If your symptoms don t improve or they get worse, call your health care provider. Psoriasis can t be cured. But its symptoms can be managed.     4007-0080 [employer]. [address] [place] [address]. All rights reserved. This information is not intended as a substitute for professional medical care. Always follow your healthcare professional's instructions.        Psoriasis  Psoriasis is an inflammatory condition that affects the skin and nails. You may have patches of thick, red skin (plaques) covered with silvery scales. These usually appear on the elbows, knees, legs, lower back, and scalp.  The plaques itch and can be painful. There may also be psychological and emotional distress.  Psoriasis is not contagious but can be inherited. The latest research shows that this may be an immune disorder. The immune system reacts to healthy skin like it is a foreign substance. This causes skin cells to grow faster than normal and to stack up in raised red patches. The disease is chronic with periods of flares and remissions.  Smoking, sun exposure, and  alcohol use may affect how often the psoriasis occurs and how long the flares last.  There is no cure, but treatments can offer relief. Treatment consists of a combination of topical creams, light therapy (phototherapy), and oral medicines.  Home care  The following guidelines can help you care for yourself at home:    No specific diet is required. Eat a healthy, well-balanced diet that includes fresh fruits and vegetables, whole grains, and lean meats. Psoriasis can increase the risk for diabetes and heart disease.    Increasing omega-3 fatty acids in your diet can help improve dry skin. The best dietary sources are fatty fish (salmon, mackerel, lake trout, albacore tuna) or fish oil (such as cod liver oil). A great way to take fish oil is to add it to a juice, shake, or smoothie. Flaxseeds and flaxseed oil, canola oil, walnuts, soybean, and tofu are converted to omega-3 fatty acid in the body.    Maintain a healthy weight. Overlapping skin folds can be a site for psoriasis plaques. If you are overweight, talk to your doctor about a weight-loss program.    Bathing daily can help remove scales and calm inflamed skin. Use lukewarm water and mild soaps that have added oils, fats, and moisturizers. Avoid deodorants, antiperspirants, and antibacterial soaps, as these have a drying effect. Many find that soaking in a tub with added bath oils, oatmeal, apple cider vinegar, or Epsom salts is helpful.    After bathing, put on moisturizing cream (or a skin oil for a stronger effect).    Some exposure to UV rays from the sun can improve psoriasis, but too much can trigger an outbreak. It also increases your risk for skin cancer. Limit sun exposure and use sunscreen on healthy skin (at least 15 SPF).    If you are prescribed medication, take it as directed.    Unless another steroid cream was prescribed, you may use over-the-counter hydrocortisone cream for a few weeks during symptom flare-ups.    Stop smoking. If you are a  long-time smoker, this can be hard. Consider enrolling in a stop-smoking program.    Tell your doctor if your joints start to ache or get stiff.    Tell your doctor if you notice changes in your fingernails.    Depression is more common among psoriasis patients. Get help if you notice changes in your mood.  Follow-up care  Follow up with your doctor or as advised by our staff.  When to seek medical care  Get prompt medical attention if any of the following occur:    Skin pain gets worse    Bleeding from the skin plaques that is hard to control    Signs of skin infection (redness, increasing pain, swelling, pus)    Fever of 100.4 F (38 C) or higher, or as directed by your health care provider    0359-5114 [employer]. [address] [place] [address], [place] [address]. All rights reserved. This information is not intended as a substitute for professional medical care. Always follow your healthcare professional's instructions.

## 2017-05-02 NOTE — MR AVS SNAPSHOT
After Visit Summary   5/2/2017    Jimena Moreira    MRN: [id number]           Patient Information     Date Of Birth          1980        Visit Information        Provider Department      5/2/2017 7:50 AM Ramon Raphael MD [facility]        Today's Diagnoses     Psoriasis    -  1    Gastroesophageal reflux disease without esophagitis        Attention deficit hyperactivity disorder (ADHD), combined type        Anxiety          Care Instructions      What is Psoriasis?  Psoriasis is a chronic skin disease. Psoriasis can begin at any age. It is most common between ages 30 and 39 and also between ages 50 and 69. Psoriasis affects nearly equal numbers of men and women. In people with this disease, the skin grows too fast. Dead skin cells build up on the skin s surface to form inflamed, thick, silvery scales called plaques. Sometimes people form many small lesions that can hurt or have pus in them. Psoriasis does not spread from person to person.     About your symptoms  Psoriasis plaques tend to form on the elbows, knees, scalp, navel, arms, legs, and the penis or vulva. They can be unsightly, painful, and itchy. Plaques on the joints can limit movement. On the fingernails or toenails, psoriasis can cause pitting, a change in nail color, and a change in nail shape. In some cases, psoriasis also causes symptoms that are like arthritis. Symptoms may come and go on their own. Factors such as stress, infection, and certain medications may cause flare-ups. If symptoms bother you, know that many effective medical treatments exist  that can help relieve symptoms for most people with psoriasis. Discuss your treatment options with your health care provider.  External medical treatments  There are many types of external medical treatments that are used to treat the affected skin lesions. Your health care provider may prescribe one of many types of topical medications. These include strong  topical steroids or steroid-sparing agents. You apply them to your skin on a regular basis. Coal tar (a thick black liquid) may be applied to thick plaques. In some cases, the skin may be exposed to a special light in the health care provider's office. Or, you can expose the psoriatic plaques to short periods (5 minutes) of natural sun as directed by your health care provider. This method is called phototherapy. It can be enhanced with the use of psoralen (a type of medication).  Internal medical treatments  Internal treatments are taken orally (by mouth) or given by injection. There are a number of oral medications for more severe psoriasis. Your health care provider can tell you more about these treatments.    3829-8713 [employer]. [address] [place] [address], [place] [address]. All rights reserved. This information is not intended as a substitute for professional medical care. Always follow your healthcare professional's instructions.        Managing Psoriasis  The success of your medical treatment depends on you. When your health care provider gives you a treatment plan, ask when you should expect to see results. Then, follow your plan. If your treatment does not work in the expected time, let your health care provider know. Psoriasis is a common disease, and it can respond to many different treatments. It depends on the location, size, and symptoms each person experiences. Some treatments are simple (tar-based therapies or topical steroids), and some are complex (new biologic medications or light therapy). Your health care provider will need to personalize your treatment. Psoriasis frequently will get better with treatment, but later worsen if you stop treatment or if a new illness ocurs. In most cases, you can get control of your psoriasis again. You will likely need to see your health care provider regularly about treatment options.     Take baths in warm water to help soften scales.   Psoriasis  self-care  Follow these steps to help manage your symptoms:    Take baths to help soften scales. Use warm, not hot, water. To avoid drying out your skin, limit each bath to about 15 minutes. Add bath oil or [place] Sea salts.    After you bathe, apply lotion right away, while your skin is damp. Dry skin can make symptoms worse.    Use a scalp treatment as prescribed by your health care provider. There are different solutions and dosages based on your symptoms.     Promptly seek treatment for any skin injuries because they can cause flare-ups.    Manage your stress, and use relaxation techniques.    Expose your psoriatic skin to sunlight for 5 minutes a day, except if you feel that sun exposure makes your psoriasis worse. Use sunscreen on the normal, unaffected skin, but avoid sunburns.    Use over-the-counter hydrocortisone cream for itching to reduce scaling for active outbreaks. Consult with your health care provider for long-term use.    Stick with treatment that your health care provider has recommended for you, especially if it's controlling your psoriasis.    Avoid abrasive cleansers, harsh detergents, and household chemicals.  Getting good results  Now that you know more about psoriasis, the next step is up to you. Follow your health care provider's treatment plan and self-care routine. Doing so can help you control your symptoms. If your symptoms don t improve or they get worse, call your health care provider. Psoriasis can t be cured. But its symptoms can be managed.     8231-4027 [employer]. [address] [place] [address], [place] [address]. All rights reserved. This information is not intended as a substitute for professional medical care. Always follow your healthcare professional's instructions.        Psoriasis  Psoriasis is an inflammatory condition that affects the skin and nails. You may have patches of thick, red skin (plaques) covered with silvery scales. These usually appear on the elbows,  knees, legs, lower back, and scalp.  The plaques itch and can be painful. There may also be psychological and emotional distress.  Psoriasis is not contagious but can be inherited. The latest research shows that this may be an immune disorder. The immune system reacts to healthy skin like it is a foreign substance. This causes skin cells to grow faster than normal and to stack up in raised red patches. The disease is chronic with periods of flares and remissions.  Smoking, sun exposure, and alcohol use may affect how often the psoriasis occurs and how long the flares last.  There is no cure, but treatments can offer relief. Treatment consists of a combination of topical creams, light therapy (phototherapy), and oral medicines.  Home care  The following guidelines can help you care for yourself at home:    No specific diet is required. Eat a healthy, well-balanced diet that includes fresh fruits and vegetables, whole grains, and lean meats. Psoriasis can increase the risk for diabetes and heart disease.    Increasing omega-3 fatty acids in your diet can help improve dry skin. The best dietary sources are fatty fish (salmon, mackerel, lake trout, albacore tuna) or fish oil (such as cod liver oil). A great way to take fish oil is to add it to a juice, shake, or smoothie. Flaxseeds and flaxseed oil, canola oil, walnuts, soybean, and tofu are converted to omega-3 fatty acid in the body.    Maintain a healthy weight. Overlapping skin folds can be a site for psoriasis plaques. If you are overweight, talk to your doctor about a weight-loss program.    Bathing daily can help remove scales and calm inflamed skin. Use lukewarm water and mild soaps that have added oils, fats, and moisturizers. Avoid deodorants, antiperspirants, and antibacterial soaps, as these have a drying effect. Many find that soaking in a tub with added bath oils, oatmeal, apple cider vinegar, or Epsom salts is helpful.    After bathing, put on  moisturizing cream (or a skin oil for a stronger effect).    Some exposure to UV rays from the sun can improve psoriasis, but too much can trigger an outbreak. It also increases your risk for skin cancer. Limit sun exposure and use sunscreen on healthy skin (at least 15 SPF).    If you are prescribed medication, take it as directed.    Unless another steroid cream was prescribed, you may use over-the-counter hydrocortisone cream for a few weeks during symptom flare-ups.    Stop smoking. If you are a long-time smoker, this can be hard. Consider enrolling in a stop-smoking program.    Tell your doctor if your joints start to ache or get stiff.    Tell your doctor if you notice changes in your fingernails.    Depression is more common among psoriasis patients. Get help if you notice changes in your mood.  Follow-up care  Follow up with your doctor or as advised by our staff.  When to seek medical care  Get prompt medical attention if any of the following occur:    Skin pain gets worse    Bleeding from the skin plaques that is hard to control    Signs of skin infection (redness, increasing pain, swelling, pus)    Fever of 100.4 F (38 C) or higher, or as directed by your health care provider    1350-7021 [employer]. [address] [place] [address]. All rights reserved. This information is not intended as a substitute for professional medical care. Always follow your healthcare professional's instructions.              Follow-ups after your visit        Who to contact     If you have questions or need follow up information about today's clinic visit or your schedule please contact [facility] directly at [phone].  Normal or non-critical lab and imaging results will be communicated to you by MyChart, letter or phone within 4 business days after the clinic has received the results. If you do not hear from us within 7 days, please contact the clinic through MyChart or phone. If  you have a critical or abnormal lab result, we will notify you by phone as soon as possible.  Submit refill requests through [employer] or call your pharmacy and they will forward the refill request to us. Please allow 3 business days for your refill to be completed.          Additional Information About Your Visit        Click Contacthart Information     [employer] gives you secure access to your electronic health record. If you see a primary care provider, you can also send messages to your care team and make appointments. If you have questions, please call your primary care clinic.  If you do not have a primary care provider, please call [phone] and they will assist you.        Care EveryWhere ID     This is your Care EveryWhere ID. This could be used by other organizations to access your [place] medical records  [id number]        Your Vitals Were     Pulse Temperature Respirations BMI (Body Mass Index)          86 98.5  F (36.9  C) (Temporal) 18 30.76 kg/m2         Blood Pressure from Last 3 Encounters:   05/02/17 100/62   02/09/17 116/70   08/08/16 110/70    Weight from Last 3 Encounters:   05/02/17 182 lb (82.6 kg)   02/09/17 185 lb 6.4 oz (84.1 kg)   08/08/16 175 lb 9.6 oz (79.7 kg)              Today, you had the following     No orders found for display         Today's Medication Changes          These changes are accurate as of: 5/2/17  8:29 AM.  If you have any questions, ask your nurse or doctor.               Start taking these medicines.        Dose/Directions    amphetamine-dextroamphetamine 30 MG per 24 hr capsule   Commonly known as:  ADDERALL XR   Used for:  Attention deficit hyperactivity disorder (ADHD), combined type   Started by:  Ramon Raphael MD        Dose:  30 mg   Start taking on:  7/2/2017   Take 1 capsule (30 mg) by mouth daily   Quantity:  30 capsule   Refills:  0       triamcinolone 0.1 % cream   Commonly known as:  KENALOG   Used for:  Psoriasis   Started by:  Ramon Raphael MD         Apply sparingly to affected area two times daily as needed   Quantity:  453.6 g   Refills:  0            Where to get your medicines      These medications were sent to [facility] - [place], [place] - [address]  [address] [facility] [address]     Phone:  [phone]     omeprazole 20 MG tablet    triamcinolone 0.1 % cream    venlafaxine 75 MG 24 hr capsule         Some of these will need a paper prescription and others can be bought over the counter.  Ask your nurse if you have questions.     Bring a paper prescription for each of these medications     amphetamine-dextroamphetamine 30 MG per 24 hr capsule                Primary Care Provider Office Phone # Fax #    Ramon Raphael [phone]       [place] [address] [facility] [address]        Thank you!     Thank you for choosing [facility]  for your care. Our goal is always to provide you with excellent care. Hearing back from our patients is one way we can continue to improve our services. Please take a few minutes to complete the written survey that you may receive in the mail after your visit with us. Thank you!             Your Updated Medication List - Protect others around you: Learn how to safely use, store and throw away your medicines at www.disposemymeds.org.          This list is accurate as of: 5/2/17  8:29 AM.  Always use your most recent med list.                   Brand Name Dispense Instructions for use    ALPRAZolam 0.5 MG tablet    XANAX    30 tablet    Take 1 tablet (0.5 mg) by mouth as needed for anxiety 1-2 tabs as needed       amphetamine-dextroamphetamine 30 MG per 24 hr capsule   Start taking on:  7/2/2017    ADDERALL XR    30 capsule    Take 1 capsule (30 mg) by mouth daily       etonogestrel-ethinyl estradiol 0.12-0.015 MG/24HR vaginal ring    NUVARING    3 each    Place 1 each vaginally every 28 days       hydroxychloroquine 200 MG  tablet    PLAQUENIL    60 tablet    Take 1 tablet (200 mg) by mouth 2 times daily       hydrOXYzine 25 MG tablet    ATARAX    60 tablet    Take 1-2 tablets (25-50 mg) by mouth every 6 hours as needed for anxiety       ibuprofen 800 MG tablet    ADVIL/MOTRIN    90 tablet    Take 1 tablet (800 mg) by mouth every 8 hours as needed for pain       omeprazole 20 MG tablet     90 tablet    Take 1 tablet (20 mg) by mouth daily Take 30-60 minutes before a meal.       triamcinolone 0.1 % cream    KENALOG    453.6 g    Apply sparingly to affected area two times daily as needed       venlafaxine 75 MG 24 hr capsule    EFFEXOR-XR    270 capsule    TAKE THREE CAPSULES BY MOUTH EVERY DAY

## 2017-05-02 NOTE — TELEPHONE ENCOUNTER
Called [facility] about patients Surgical Pathology report from 11/1/2016. Left message for Release of Information office to fax over for Pathology report from 11/1/2016 for PCP to review. Waiting for fax. If fax doesn't show up in a day or two please call again and have them fax them over.  Michelle Hernandez MA

## 2017-05-02 NOTE — NURSING NOTE
"Chief Complaint   Patient presents with     AYVANHJEROME     Derm Problem     Panel Management     UTD       Initial /62 (Cuff Size: Adult Regular)  Pulse 86  Temp 98.5  F (36.9  C) (Temporal)  Resp 18  Wt 182 lb (82.6 kg)  BMI 30.76 kg/m2 Estimated body mass index is 30.76 kg/(m^2) as calculated from the following:    Height as of 2/9/17: 5' 4.5\" (1.638 m).    Weight as of this encounter: 182 lb (82.6 kg).  Medication Reconciliation: complete   Tracie Aguilar CMA ([facility])    "

## 2017-05-04 NOTE — TELEPHONE ENCOUNTER
Spoke to [facility] dermatology. They will fax to Dr. Raphael Today 05/04/17.  Tracie Aguilar CMA ([facility])

## 2017-05-04 NOTE — TELEPHONE ENCOUNTER
Results received and put into providers box will close encounter.  Tracie Aguilar CMA ([facility])

## 2017-06-06 ENCOUNTER — TRANSFERRED RECORDS (OUTPATIENT)
Dept: HEALTH INFORMATION MANAGEMENT | Facility: CLINIC | Age: 37
End: 2017-06-06

## 2017-06-20 ENCOUNTER — MYC MEDICAL ADVICE (OUTPATIENT)
Dept: FAMILY MEDICINE | Facility: OTHER | Age: 37
End: 2017-06-20

## 2017-06-22 ENCOUNTER — TELEPHONE (OUTPATIENT)
Dept: FAMILY MEDICINE | Facility: OTHER | Age: 37
End: 2017-06-22

## 2017-06-22 NOTE — PROGRESS NOTES
SUBJECTIVE:                                                    Jimena Moreira is a 37 year old female who presents to clinic today for the following health issues:    Forms-FMLA paper work    Left dorsal hand bee sting, Friday, doing well.  Factor 5 positive, not starting on coumadin, seen haematologist.  Patient has SLE, being well by Rheumatologist.  Form reviewed and completed.    Problem list and histories reviewed & adjusted, as indicated.  Additional history: as documented    Patient Active Problem List   Diagnosis     CARDIOVASCULAR SCREENING; LDL GOAL LESS THAN 160     SLE (systemic lupus erythematosus) (H)     Sicca (H)     High risk medication use     Anxiety     Generalized anxiety disorder     Difficulty concentrating     Attention deficit     Generalized anxiety disorder     Attention deficit hyperactivity disorder (ADHD)     Past Surgical History:   Procedure Laterality Date     BIOPSY       CHOLECYSTECTOMY         Social History   Substance Use Topics     Smoking status: Former Smoker     Quit date: 2013     Smokeless tobacco: Never Used      Comment: social     Alcohol use Yes      Comment: occ     Family History   Problem Relation Age of Onset     Hypertension Father      Breast Cancer Mother      Other Cancer Maternal Grandfather 87     Lymphoma     CANCER Paternal Aunt      Cardiovascular Paternal Uncle      CHF,  in sleep     Hypertension Brother      hypertension, Etoh use     DIABETES Paternal Grandmother      Hyperlipidemia Brother          Current Outpatient Prescriptions   Medication Sig Dispense Refill     triamcinolone (KENALOG) 0.1 % cream Apply sparingly to affected area two times daily as needed 453.6 g 0     omeprazole 20 MG tablet Take 1 tablet (20 mg) by mouth daily Take 30-60 minutes before a meal. 90 tablet 1     [START ON 2017] amphetamine-dextroamphetamine (ADDERALL XR) 30 MG per 24 hr capsule Take 1 capsule (30 mg) by mouth daily 30 capsule 0     venlafaxine  "(EFFEXOR-XR) 75 MG 24 hr capsule TAKE THREE CAPSULES BY MOUTH EVERY  capsule 2     hydrOXYzine (ATARAX) 25 MG tablet Take 1-2 tablets (25-50 mg) by mouth every 6 hours as needed for anxiety 60 tablet 1     hydroxychloroquine (PLAQUENIL) 200 MG tablet Take 1 tablet (200 mg) by mouth 2 times daily (Patient taking differently: Take 200 mg by mouth 2 times daily Patient takes 2 tablets in the morning daily.) 60 tablet 6     ALPRAZolam (XANAX) 0.5 MG tablet Take 1 tablet (0.5 mg) by mouth as needed for anxiety 1-2 tabs as needed (Patient not taking: Reported on 6/26/2017) 30 tablet 0     etonogestrel-ethinyl estradiol (NUVARING) 0.12-0.015 MG/24HR vaginal ring Place 1 each vaginally every 28 days (Patient not taking: Reported on 6/26/2017) 3 each 2     ibuprofen (ADVIL,MOTRIN) 800 MG tablet Take 1 tablet (800 mg) by mouth every 8 hours as needed for pain (Patient not taking: Reported on 6/26/2017) 90 tablet 1     Allergies   Allergen Reactions     No Clinical Screening - See Comments Dermatitis     Nickel Chloride  [Nickel] Dermatitis     Zoloft [Sertraline] Anxiety     Cliches jaw, Increased anxiety       Reviewed and updated as needed this visit by clinical staff  Tobacco  Allergies  Meds  Med Hx  Surg Hx  Fam Hx  Soc Hx      Reviewed and updated as needed this visit by Provider         ROS:  Constitutional, HEENT, cardiovascular, pulmonary, gi and gu systems are negative, except as otherwise noted.    OBJECTIVE:     /72  Pulse 88  Temp 97.6  F (36.4  C) (Temporal)  Resp 16  Ht 5' 6\" (1.676 m)  Wt 181 lb 6.4 oz (82.3 kg)  LMP 06/01/2017 (Approximate)  Breastfeeding? No  BMI 29.28 kg/m2  Body mass index is 29.28 kg/(m^2).  GENERAL: healthy, alert and no distress  NEURO: mentation intact and speech normal  PSYCH: mentation appears normal, affect normal/bright  Left dorsal hand bee sting, mild erythema, FROM left hand    ASSESSMENT/PLAN:       ICD-10-CM    1. Systemic lupus erythematosus, " unspecified SLE type, unspecified organ involvement status (H) M32.9    2. Insect bite, initial encounter W57.XXXA      Form reviewed and completed to best of my knowledge, copied and send for scan.  The patient understood the rational for the diagnosis and treatment plan.   All questions were answered to best of my ability and the patient's satisfaction.    Risks, benefits and alternatives of treatments discussed. Plan agreed on.  Followup: if not better.        Patient Instructions       Preventing Deep Vein Thrombosis  Healthcare providers use the term venous thromboembolism (VTE) to describe two conditions, deep vein thrombosis (DVT) and pulmonary embolism (PE). They use the term VTE because the two conditions are very closely related. And, because their prevention and treatment are closely related.   DVT is a blood clot or thrombus in a deep vein. Most of these clots develop in the leg or thigh. But, they may form in a vein in the arm, or other part of the body.   Part of the blood clot may separate from the vein. This is called an embolus. It may travel to the lungs and form a pulmonary embolus. This can cut off the flow of blood to a portion of or to the entire lung. A blood clot in the lungs is a medical emergency and may cause death.  Over time, blood clots can also permanently damage veins. They must be treated right away to prevent problems.   Risk factors  Anyone can develop a blood clot. But the following risk factors make a blood clot more likely to happen:    Being inactive for a long period, such as when you re in the hospital, or traveling by plane or car    Injury to a vein from an accident, a broken bone, or surgery    Having blood clots in the past or a family history of blood clots    Blood clotting disorder    Recent surgery    Cancer and certain cancer treatments    Smoking  Other factors can also put you at higher risk for a blood clot. They include:    Age over 60  years    Pregnancy    Taking birth control pills or hormone replacement    Having other vein problems, such as varicose veins     Being overweight    Having a pacemaker or a central venous catheter. They increase the chance of a blood clot forming in an arm.    Injection drug use. This also increases the chance of a blood clot forming in an arm.  How to prevent DVT  Preventing a blood clot means improving blood flow back to your heart. To help prevent a blood clot:    Talk with your healthcare provider about a program of regular exercise.    If your legs feel swollen or heavy, take a break and sit comfortably or lie down with your feet up.    Keep a healthy weight.    Quit smoking, if you smoke.    Avoid sitting, standing, or lying down for long periods without moving your legs and feet:    When traveling by car, make frequent stops to get out and move around.    On long airplane, train, or bus rides, get up and move around when possible.    If you can t get up, wiggle your toes and tighten your calves to keep your blood moving, as pictured below.  If you need to have surgery, talk with your healthcare provider about a plan to prevent blood clots.   If you are in the hospital, your risk for blood clots increases. Your healthcare provider may prescribe an anticoagulant medicine or a blood thinner to help prevent blood clots. Or your healthcare provider may prescribe a sequential compression device (SCD) or intermittent pneumatic compression (IPC). The device has sleeves that fit around your legs. It applies gentle pressure to help with blood flow and prevent blood clots. Remove the sleeves so that you do not trip or fall when you are walking, like when you use the bathroom or shower. If you need help removing the sleeves, ask the nurse or aid. You may also want to try the following:    When to seek immediate medical attention  If you have symptoms of a blood clot in your lungs, call 911 or get emergency help. The  symptoms are:    Chest pain    Trouble breathing    Fast heartbeat    Coughing (may cough up blood)    Sweating    Fainting  When to call your healthcare provider  If you have symptoms of a blood clot, call your healthcare provider. The symptoms are:    Pain    Swelling    Redness or discoloration in a leg, arm, or other area   Date Last Reviewed: 5/1/2016 2000-2017 [employer]. [address] [place] [address], [place] [address]. All rights reserved. This information is not intended as a substitute for professional medical care. Always follow your healthcare professional's instructions.        Insect Bite  Insects most often bite to protect themselves or their nests. Certain bugs, like fleas and mosquitoes, bite to feed. In some cases, the actual bite causes no pain. An itchy red welt or swelling may develop at the site of the bite. Most insect bites do not cause illness. And the itching and swelling most often go away without treatment. However, an infection can develop if the bite is scratched and the skin broken. Rarely, a person may have an allergic reaction to an insect bite.  If a stinger is visible at the bite spot, remove it as quickly as possible, as this can decrease the amount of venom that gets into your body. Scrape it out with a dull edge, such as the edge of a credit card. Try not to squeeze it. Do not try to dig it out, as you may damage the skin and also increase the chance of infection.     To help reduce swelling and itching, apply a cold pack or ice in a zip-top plastic bag wrapped in a thin towel.   Home care    Your healthcare provider may prescribe over-the-counter medicines to help relieve itching and swelling. Use each medicine according to the directions on the package. If the bite becomes infected, you will need an antibiotic. This may be in pill form taken by mouth or as an ointment or cream put directly on the skin. Be sure to use them exactly as prescribed.    Bite symptoms  "usually go away on their own within a week or two.    To help prevent infection, avoid scratching or picking at the bite.    To help relieve itching and swelling, apply ice in a zip-top plastic bag wrapped in a thin towel to the bites. Do this for up to 10 minutes at a time. Avoid hot showers or baths as these tend to make itching worse.    An over-the-counter anti-itch medicine such as calamine lotion or an antihistamine cream may be helpful.    If you suspect you have insects in your home, talk to a licensed pest-control professional. He or she can inspect your home and tell you how to get rid of bugs safely.  Follow-up care  Follow up with your healthcare provider, or as advised.  Call 911  Call 911 if any of these occur:    Trouble breathing or swallowing    Wheezing    Feeling like your throat is closing up    Fainting, loss of consciousness    Swelling around the face or mouth  When to seek medical advice  Call your healthcare provider right away if any of these occur:    Fever of 100.4 F (38 C) or higher, or as directed by your healthcare provider    Signs of infection, such as increased swelling and pain, warmth, red streaks, or drainage from the skin    Signs of allergic reaction, such as hives, a spreading rash, or throat itching  Date Last Reviewed: 10/1/2016    [id number] [employer]. [address] [place] [address], [place] [address]. All rights reserved. This information is not intended as a substitute for professional medical care. Always follow your healthcare professional's instructions.            Ramon Raphael MD  [facility]      /72  Pulse 88  Temp 97.6  F (36.4  C) (Temporal)  Resp 16  Ht 5' 6\" (1.676 m)  Wt 181 lb 6.4 oz (82.3 kg)  LMP 06/01/2017 (Approximate)  Breastfeeding? No  BMI 29.28 kg/m2    "

## 2017-06-22 NOTE — TELEPHONE ENCOUNTER
Please inform patient, I received her FLMA forms.  Please advise her to make an appointment and we can review the form and complete it.    Thank you.    Electronically signed:  Ramon Raphael M.D.

## 2017-06-26 ENCOUNTER — OFFICE VISIT (OUTPATIENT)
Dept: FAMILY MEDICINE | Facility: OTHER | Age: 37
End: 2017-06-26
Payer: COMMERCIAL

## 2017-06-26 VITALS
WEIGHT: 181.4 LBS | TEMPERATURE: 97.6 F | BODY MASS INDEX: 29.15 KG/M2 | HEIGHT: 66 IN | HEART RATE: 88 BPM | RESPIRATION RATE: 16 BRPM | SYSTOLIC BLOOD PRESSURE: 126 MMHG | DIASTOLIC BLOOD PRESSURE: 72 MMHG

## 2017-06-26 DIAGNOSIS — M32.9 SYSTEMIC LUPUS ERYTHEMATOSUS, UNSPECIFIED SLE TYPE, UNSPECIFIED ORGAN INVOLVEMENT STATUS (H): Primary | ICD-10-CM
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
## 2017-06-26 PROCEDURE — 99213 OFFICE O/P EST LOW 20 MIN: CPT | Performed by: FAMILY MEDICINE

## 2017-06-26 ASSESSMENT — ANXIETY QUESTIONNAIRES
5. BEING SO RESTLESS THAT IT IS HARD TO SIT STILL: NOT AT ALL
7. FEELING AFRAID AS IF SOMETHING AWFUL MIGHT HAPPEN: NOT AT ALL
6. BECOMING EASILY ANNOYED OR IRRITABLE: NOT AT ALL
IF YOU CHECKED OFF ANY PROBLEMS ON THIS QUESTIONNAIRE, HOW DIFFICULT HAVE THESE PROBLEMS MADE IT FOR YOU TO DO YOUR WORK, TAKE CARE OF THINGS AT HOME, OR GET ALONG WITH OTHER PEOPLE: NOT DIFFICULT AT ALL
1. FEELING NERVOUS, ANXIOUS, OR ON EDGE: SEVERAL DAYS
GAD7 TOTAL SCORE: 3
2. NOT BEING ABLE TO STOP OR CONTROL WORRYING: SEVERAL DAYS
3. WORRYING TOO MUCH ABOUT DIFFERENT THINGS: SEVERAL DAYS

## 2017-06-26 ASSESSMENT — PATIENT HEALTH QUESTIONNAIRE - PHQ9: 5. POOR APPETITE OR OVEREATING: NOT AT ALL

## 2017-06-26 ASSESSMENT — PAIN SCALES - GENERAL: PAINLEVEL: MILD PAIN (2)

## 2017-06-26 NOTE — MR AVS SNAPSHOT
After Visit Summary   6/26/2017    Jimena Moreira    MRN: [id number]           Patient Information     Date Of Birth          1980        Visit Information        Provider Department      6/26/2017 7:30 AM Ramon Raphael MD [facility]        Today's Diagnoses     Systemic lupus erythematosus, unspecified SLE type, unspecified organ involvement status (H)    -  1    Insect bite, initial encounter          Care Instructions      Preventing Deep Vein Thrombosis  Healthcare providers use the term venous thromboembolism (VTE) to describe two conditions, deep vein thrombosis (DVT) and pulmonary embolism (PE). They use the term VTE because the two conditions are very closely related. And, because their prevention and treatment are closely related.   DVT is a blood clot or thrombus in a deep vein. Most of these clots develop in the leg or thigh. But, they may form in a vein in the arm, or other part of the body.   Part of the blood clot may separate from the vein. This is called an embolus. It may travel to the lungs and form a pulmonary embolus. This can cut off the flow of blood to a portion of or to the entire lung. A blood clot in the lungs is a medical emergency and may cause death.  Over time, blood clots can also permanently damage veins. They must be treated right away to prevent problems.   Risk factors  Anyone can develop a blood clot. But the following risk factors make a blood clot more likely to happen:    Being inactive for a long period, such as when you re in the hospital, or traveling by plane or car    Injury to a vein from an accident, a broken bone, or surgery    Having blood clots in the past or a family history of blood clots    Blood clotting disorder    Recent surgery    Cancer and certain cancer treatments    Smoking  Other factors can also put you at higher risk for a blood clot. They include:    Age over 60 years    Pregnancy    Taking birth control pills  or hormone replacement    Having other vein problems, such as varicose veins     Being overweight    Having a pacemaker or a central venous catheter. They increase the chance of a blood clot forming in an arm.    Injection drug use. This also increases the chance of a blood clot forming in an arm.  How to prevent DVT  Preventing a blood clot means improving blood flow back to your heart. To help prevent a blood clot:    Talk with your healthcare provider about a program of regular exercise.    If your legs feel swollen or heavy, take a break and sit comfortably or lie down with your feet up.    Keep a healthy weight.    Quit smoking, if you smoke.    Avoid sitting, standing, or lying down for long periods without moving your legs and feet:    When traveling by car, make frequent stops to get out and move around.    On long airplane, train, or bus rides, get up and move around when possible.    If you can t get up, wiggle your toes and tighten your calves to keep your blood moving, as pictured below.  If you need to have surgery, talk with your healthcare provider about a plan to prevent blood clots.   If you are in the hospital, your risk for blood clots increases. Your healthcare provider may prescribe an anticoagulant medicine or a blood thinner to help prevent blood clots. Or your healthcare provider may prescribe a sequential compression device (SCD) or intermittent pneumatic compression (IPC). The device has sleeves that fit around your legs. It applies gentle pressure to help with blood flow and prevent blood clots. Remove the sleeves so that you do not trip or fall when you are walking, like when you use the bathroom or shower. If you need help removing the sleeves, ask the nurse or aid. You may also want to try the following:    When to seek immediate medical attention  If you have symptoms of a blood clot in your lungs, call 911 or get emergency help. The symptoms are:    Chest pain    Trouble  breathing    Fast heartbeat    Coughing (may cough up blood)    Sweating    Fainting  When to call your healthcare provider  If you have symptoms of a blood clot, call your healthcare provider. The symptoms are:    Pain    Swelling    Redness or discoloration in a leg, arm, or other area   Date Last Reviewed: 5/1/2016 2000-2017 [employer]. [address] [place] [address], [place] [address]. All rights reserved. This information is not intended as a substitute for professional medical care. Always follow your healthcare professional's instructions.        Insect Bite  Insects most often bite to protect themselves or their nests. Certain bugs, like fleas and mosquitoes, bite to feed. In some cases, the actual bite causes no pain. An itchy red welt or swelling may develop at the site of the bite. Most insect bites do not cause illness. And the itching and swelling most often go away without treatment. However, an infection can develop if the bite is scratched and the skin broken. Rarely, a person may have an allergic reaction to an insect bite.  If a stinger is visible at the bite spot, remove it as quickly as possible, as this can decrease the amount of venom that gets into your body. Scrape it out with a dull edge, such as the edge of a credit card. Try not to squeeze it. Do not try to dig it out, as you may damage the skin and also increase the chance of infection.     To help reduce swelling and itching, apply a cold pack or ice in a zip-top plastic bag wrapped in a thin towel.   Home care    Your healthcare provider may prescribe over-the-counter medicines to help relieve itching and swelling. Use each medicine according to the directions on the package. If the bite becomes infected, you will need an antibiotic. This may be in pill form taken by mouth or as an ointment or cream put directly on the skin. Be sure to use them exactly as prescribed.    Bite symptoms usually go away on their own within a week  or two.    To help prevent infection, avoid scratching or picking at the bite.    To help relieve itching and swelling, apply ice in a zip-top plastic bag wrapped in a thin towel to the bites. Do this for up to 10 minutes at a time. Avoid hot showers or baths as these tend to make itching worse.    An over-the-counter anti-itch medicine such as calamine lotion or an antihistamine cream may be helpful.    If you suspect you have insects in your home, talk to a licensed pest-control professional. He or she can inspect your home and tell you how to get rid of bugs safely.  Follow-up care  Follow up with your healthcare provider, or as advised.  Call 911  Call 911 if any of these occur:    Trouble breathing or swallowing    Wheezing    Feeling like your throat is closing up    Fainting, loss of consciousness    Swelling around the face or mouth  When to seek medical advice  Call your healthcare provider right away if any of these occur:    Fever of 100.4 F (38 C) or higher, or as directed by your healthcare provider    Signs of infection, such as increased swelling and pain, warmth, red streaks, or drainage from the skin    Signs of allergic reaction, such as hives, a spreading rash, or throat itching  Date Last Reviewed: 10/1/2016    4232-8545 [employer]. [address] [place] [address]. All rights reserved. This information is not intended as a substitute for professional medical care. Always follow your healthcare professional's instructions.                Follow-ups after your visit        Who to contact     If you have questions or need follow up information about today's clinic visit or your schedule please contact [facility] directly at [phone].  Normal or non-critical lab and imaging results will be communicated to you by MyChart, letter or phone within 4 business days after the clinic has received the results. If you do not hear from us within 7 days, please  "contact the clinic through [employer] or phone. If you have a critical or abnormal lab result, we will notify you by phone as soon as possible.  Submit refill requests through [employer] or call your pharmacy and they will forward the refill request to us. Please allow 3 business days for your refill to be completed.          Additional Information About Your Visit        [employer] Information     [employer] gives you secure access to your electronic health record. If you see a primary care provider, you can also send messages to your care team and make appointments. If you have questions, please call your primary care clinic.  If you do not have a primary care provider, please call [phone] and they will assist you.        Care EveryWhere ID     This is your Care EveryWhere ID. This could be used by other organizations to access your [facility] medical records  [id number]        Your Vitals Were     Pulse Temperature Respirations Height Last Period Breastfeeding?    88 97.6  F (36.4  C) (Temporal) 16 5' 6\" (1.676 m) 06/01/2017 (Approximate) No    BMI (Body Mass Index)                   29.28 kg/m2            Blood Pressure from Last 3 Encounters:   06/26/17 126/72   05/02/17 100/62   02/09/17 116/70    Weight from Last 3 Encounters:   06/26/17 181 lb 6.4 oz (82.3 kg)   05/02/17 182 lb (82.6 kg)   02/09/17 185 lb 6.4 oz (84.1 kg)              Today, you had the following     No orders found for display         Today's Medication Changes          These changes are accurate as of: 6/26/17 11:59 PM.  If you have any questions, ask your nurse or doctor.               These medicines have changed or have updated prescriptions.        Dose/Directions    hydroxychloroquine 200 MG tablet   Commonly known as:  PLAQUENIL   This may have changed:  additional instructions   Used for:  SLE (systemic lupus erythematosus) (H)        Dose:  200 mg   Take 1 tablet (200 mg) by mouth 2 times daily   Quantity:  60 tablet   Refills:  6       "          Primary Care Provider Office Phone # Fax #    Ramon Raphael [phone]       [facility] [address] [facility] [address]        Equal Access to Services     MACHO APARICIO : Bunny barnhart Soclaudia, waderekda luqadaha, qaybta kaalmada renuka, kaitlynn elmoin hayaabillie suarezcarolinsandra al. So [facility] [phone].    ATENCIÓN: Si habla español, tiene a baptiste disposición servicios gratuitos de asistencia lingüística. Llame al [phone].    We comply with applicable federal civil rights laws and [place] laws. We do not discriminate on the basis of race, color, national origin, age, disability sex, sexual orientation or gender identity.            Thank you!     Thank you for choosing [facility]  for your care. Our goal is always to provide you with excellent care. Hearing back from our patients is one way we can continue to improve our services. Please take a few minutes to complete the written survey that you may receive in the mail after your visit with us. Thank you!             Your Updated Medication List - Protect others around you: Learn how to safely use, store and throw away your medicines at www.disposemymeds.org.          This list is accurate as of: 6/26/17 11:59 PM.  Always use your most recent med list.                   Brand Name Dispense Instructions for use Diagnosis    ALPRAZolam 0.5 MG tablet    XANAX    30 tablet    Take 1 tablet (0.5 mg) by mouth as needed for anxiety 1-2 tabs as needed    Anxiety       amphetamine-dextroamphetamine 30 MG per 24 hr capsule   Start taking on:  7/2/2017    ADDERALL XR    30 capsule    Take 1 capsule (30 mg) by mouth daily    Attention deficit hyperactivity disorder (ADHD), combined type       etonogestrel-ethinyl estradiol 0.12-0.015 MG/24HR vaginal ring    NUVARING    3 each    Place 1 each vaginally every 28 days    Encounter for other contraceptive management       hydroxychloroquine 200 MG tablet     PLAQUENIL    60 tablet    Take 1 tablet (200 mg) by mouth 2 times daily    SLE (systemic lupus erythematosus) (H)       hydrOXYzine 25 MG tablet    ATARAX    60 tablet    Take 1-2 tablets (25-50 mg) by mouth every 6 hours as needed for anxiety    Anxiety       ibuprofen 800 MG tablet    ADVIL/MOTRIN    90 tablet    Take 1 tablet (800 mg) by mouth every 8 hours as needed for pain    SLE (systemic lupus erythematosus) (H)       omeprazole 20 MG tablet     90 tablet    Take 1 tablet (20 mg) by mouth daily Take 30-60 minutes before a meal.    Gastroesophageal reflux disease without esophagitis       triamcinolone 0.1 % cream    KENALOG    453.6 g    Apply sparingly to affected area two times daily as needed    Psoriasis       venlafaxine 75 MG 24 hr capsule    EFFEXOR-XR    270 capsule    TAKE THREE CAPSULES BY MOUTH EVERY DAY    Anxiety

## 2017-06-26 NOTE — NURSING NOTE
"Chief Complaint   Patient presents with     Forms     Panel Management     phq, deni       Initial /72  Pulse 88  Temp 97.6  F (36.4  C) (Temporal)  Resp 16  Ht 5' 6\" (1.676 m)  Wt 181 lb 6.4 oz (82.3 kg)  LMP 06/01/2017 (Approximate)  Breastfeeding? No  BMI 29.28 kg/m2 Estimated body mass index is 29.28 kg/(m^2) as calculated from the following:    Height as of this encounter: 5' 6\" (1.676 m).    Weight as of this encounter: 181 lb 6.4 oz (82.3 kg).  Medication Reconciliation: complete    "

## 2017-06-26 NOTE — PATIENT INSTRUCTIONS
Preventing Deep Vein Thrombosis  Healthcare providers use the term venous thromboembolism (VTE) to describe two conditions, deep vein thrombosis (DVT) and pulmonary embolism (PE). They use the term VTE because the two conditions are very closely related. And, because their prevention and treatment are closely related.   DVT is a blood clot or thrombus in a deep vein. Most of these clots develop in the leg or thigh. But, they may form in a vein in the arm, or other part of the body.   Part of the blood clot may separate from the vein. This is called an embolus. It may travel to the lungs and form a pulmonary embolus. This can cut off the flow of blood to a portion of or to the entire lung. A blood clot in the lungs is a medical emergency and may cause death.  Over time, blood clots can also permanently damage veins. They must be treated right away to prevent problems.   Risk factors  Anyone can develop a blood clot. But the following risk factors make a blood clot more likely to happen:    Being inactive for a long period, such as when you re in the hospital, or traveling by plane or car    Injury to a vein from an accident, a broken bone, or surgery    Having blood clots in the past or a family history of blood clots    Blood clotting disorder    Recent surgery    Cancer and certain cancer treatments    Smoking  Other factors can also put you at higher risk for a blood clot. They include:    Age over 60 years    Pregnancy    Taking birth control pills or hormone replacement    Having other vein problems, such as varicose veins     Being overweight    Having a pacemaker or a central venous catheter. They increase the chance of a blood clot forming in an arm.    Injection drug use. This also increases the chance of a blood clot forming in an arm.  How to prevent DVT  Preventing a blood clot means improving blood flow back to your heart. To help prevent a blood clot:    Talk with your healthcare provider about a  program of regular exercise.    If your legs feel swollen or heavy, take a break and sit comfortably or lie down with your feet up.    Keep a healthy weight.    Quit smoking, if you smoke.    Avoid sitting, standing, or lying down for long periods without moving your legs and feet:    When traveling by car, make frequent stops to get out and move around.    On long airplane, train, or bus rides, get up and move around when possible.    If you can t get up, wiggle your toes and tighten your calves to keep your blood moving, as pictured below.  If you need to have surgery, talk with your healthcare provider about a plan to prevent blood clots.   If you are in the hospital, your risk for blood clots increases. Your healthcare provider may prescribe an anticoagulant medicine or a blood thinner to help prevent blood clots. Or your healthcare provider may prescribe a sequential compression device (SCD) or intermittent pneumatic compression (IPC). The device has sleeves that fit around your legs. It applies gentle pressure to help with blood flow and prevent blood clots. Remove the sleeves so that you do not trip or fall when you are walking, like when you use the bathroom or shower. If you need help removing the sleeves, ask the nurse or aid. You may also want to try the following:    When to seek immediate medical attention  If you have symptoms of a blood clot in your lungs, call 911 or get emergency help. The symptoms are:    Chest pain    Trouble breathing    Fast heartbeat    Coughing (may cough up blood)    Sweating    Fainting  When to call your healthcare provider  If you have symptoms of a blood clot, call your healthcare provider. The symptoms are:    Pain    Swelling    Redness or discoloration in a leg, arm, or other area   Date Last Reviewed: 5/1/2016 2000-2017 [employer]. [address] [place] [address], [place] [address]. All rights reserved. This information is not intended as a substitute for  professional medical care. Always follow your healthcare professional's instructions.        Insect Bite  Insects most often bite to protect themselves or their nests. Certain bugs, like fleas and mosquitoes, bite to feed. In some cases, the actual bite causes no pain. An itchy red welt or swelling may develop at the site of the bite. Most insect bites do not cause illness. And the itching and swelling most often go away without treatment. However, an infection can develop if the bite is scratched and the skin broken. Rarely, a person may have an allergic reaction to an insect bite.  If a stinger is visible at the bite spot, remove it as quickly as possible, as this can decrease the amount of venom that gets into your body. Scrape it out with a dull edge, such as the edge of a credit card. Try not to squeeze it. Do not try to dig it out, as you may damage the skin and also increase the chance of infection.     To help reduce swelling and itching, apply a cold pack or ice in a zip-top plastic bag wrapped in a thin towel.   Home care    Your healthcare provider may prescribe over-the-counter medicines to help relieve itching and swelling. Use each medicine according to the directions on the package. If the bite becomes infected, you will need an antibiotic. This may be in pill form taken by mouth or as an ointment or cream put directly on the skin. Be sure to use them exactly as prescribed.    Bite symptoms usually go away on their own within a week or two.    To help prevent infection, avoid scratching or picking at the bite.    To help relieve itching and swelling, apply ice in a zip-top plastic bag wrapped in a thin towel to the bites. Do this for up to 10 minutes at a time. Avoid hot showers or baths as these tend to make itching worse.    An over-the-counter anti-itch medicine such as calamine lotion or an antihistamine cream may be helpful.    If you suspect you have insects in your home, talk to a licensed  pest-control professional. He or she can inspect your home and tell you how to get rid of bugs safely.  Follow-up care  Follow up with your healthcare provider, or as advised.  Call 911  Call 911 if any of these occur:    Trouble breathing or swallowing    Wheezing    Feeling like your throat is closing up    Fainting, loss of consciousness    Swelling around the face or mouth  When to seek medical advice  Call your healthcare provider right away if any of these occur:    Fever of 100.4 F (38 C) or higher, or as directed by your healthcare provider    Signs of infection, such as increased swelling and pain, warmth, red streaks, or drainage from the skin    Signs of allergic reaction, such as hives, a spreading rash, or throat itching  Date Last Reviewed: 10/1/2016    2591-0358 [employer]. [address] [place] [address], [place] [address]. All rights reserved. This information is not intended as a substitute for professional medical care. Always follow your healthcare professional's instructions.

## 2017-06-27 ASSESSMENT — ANXIETY QUESTIONNAIRES: GAD7 TOTAL SCORE: 3

## 2017-06-27 ASSESSMENT — PATIENT HEALTH QUESTIONNAIRE - PHQ9: SUM OF ALL RESPONSES TO PHQ QUESTIONS 1-9: 3

## 2017-08-16 ENCOUNTER — MYC REFILL (OUTPATIENT)
Dept: FAMILY MEDICINE | Facility: OTHER | Age: 37
End: 2017-08-16

## 2017-08-16 DIAGNOSIS — K21.9 GASTROESOPHAGEAL REFLUX DISEASE WITHOUT ESOPHAGITIS: ICD-10-CM

## 2017-08-16 DIAGNOSIS — F90.2 ATTENTION DEFICIT HYPERACTIVITY DISORDER (ADHD), COMBINED TYPE: ICD-10-CM

## 2017-08-16 DIAGNOSIS — F41.9 ANXIETY: ICD-10-CM

## 2017-08-16 RX ORDER — DEXTROAMPHETAMINE SACCHARATE, AMPHETAMINE ASPARTATE MONOHYDRATE, DEXTROAMPHETAMINE SULFATE AND AMPHETAMINE SULFATE 7.5; 7.5; 7.5; 7.5 MG/1; MG/1; MG/1; MG/1
30 CAPSULE, EXTENDED RELEASE ORAL DAILY
Qty: 30 CAPSULE | Refills: 0 | Status: CANCELLED | OUTPATIENT
Start: 2017-08-16

## 2017-08-16 RX ORDER — NICOTINE POLACRILEX 4 MG/1
20 GUM, CHEWING ORAL DAILY
Qty: 90 TABLET | Refills: 1 | Status: CANCELLED | OUTPATIENT
Start: 2017-08-16

## 2017-08-17 NOTE — TELEPHONE ENCOUNTER
Message from [employer]:  Original authorizing provider: ED Alston CNP would like a refill of the following medications:  ALPRAZolam (XANAX) 0.5 MG tablet [ED Alston CNP]    Preferred pharmacy: [place] [id number] [facility]    Comment:      Medication renewals requested in this message routed to other providers:  omeprazole 20 MG tablet [Ramon Raphael MD]  amphetamine-dextroamphetamine (ADDERALL XR) 30 MG per 24 hr capsule [Ramon Raphael MD]

## 2017-08-17 NOTE — TELEPHONE ENCOUNTER
Message from [employer]:  Original authorizing provider: MD Jimena Beard would like a refill of the following medications:  omeprazole 20 MG tablet [Ramon Raphael MD]  amphetamine-dextroamphetamine (ADDERALL XR) 30 MG per 24 hr capsule [Ramon Raphael MD]    Preferred pharmacy: [address]    Comment:      Medication renewals requested in this message routed to other providers:  ALPRAZolam (XANAX) 0.5 MG tablet [Zofia Bucio, APRN CNP]

## 2017-08-17 NOTE — TELEPHONE ENCOUNTER
Xanax      Last Written Prescription Date:  2/9/17  Last Fill Quantity: 30,   # refills: 0  Last Office Visit with [facility] prescribing provider: 6/26/17  Future Office visit:       Routing refill request to provider for review/approval because:  Drug not on the [facility] refill protocol or controlled substance  Luiza Coulter MA     8/17/2017

## 2017-08-17 NOTE — TELEPHONE ENCOUNTER
Omeprazole 20 mg      Last Written Prescription Date: 5/2/2017  Last Fill Quantity: 90,  # refills: 1   Last [email or web address] prescribing provider: 6/26/2017    Adderall Xr 30mg      Last Written Prescription Date:  7/2/2017  Last Fill Quantity: 30,   # refills: 0  Last Office Visit with [facility] or  [email or web address] prescribing provider: 6/26/2017  Future Office visit:       Routing refill request to provider for review/approval because:  Drug not on the [facility] or  [email or web address] refill protocol or controlled substance

## 2017-08-18 RX ORDER — ALPRAZOLAM 0.5 MG
0.5 TABLET ORAL PRN
Qty: 30 TABLET | Refills: 0 | Status: SHIPPED | OUTPATIENT
Start: 2017-08-18 | End: 2018-11-02

## 2017-08-21 RX ORDER — DEXTROAMPHETAMINE SACCHARATE, AMPHETAMINE ASPARTATE MONOHYDRATE, DEXTROAMPHETAMINE SULFATE AND AMPHETAMINE SULFATE 7.5; 7.5; 7.5; 7.5 MG/1; MG/1; MG/1; MG/1
30 CAPSULE, EXTENDED RELEASE ORAL DAILY
Qty: 30 CAPSULE | Refills: 0 | OUTPATIENT
Start: 2017-08-21

## 2017-08-25 RX ORDER — NICOTINE POLACRILEX 4 MG/1
20 GUM, CHEWING ORAL DAILY
Qty: 90 TABLET | Refills: 1 | Status: SHIPPED | OUTPATIENT
Start: 2017-08-25 | End: 2018-02-08

## 2017-08-25 RX ORDER — DEXTROAMPHETAMINE SACCHARATE, AMPHETAMINE ASPARTATE MONOHYDRATE, DEXTROAMPHETAMINE SULFATE AND AMPHETAMINE SULFATE 7.5; 7.5; 7.5; 7.5 MG/1; MG/1; MG/1; MG/1
30 CAPSULE, EXTENDED RELEASE ORAL DAILY
Qty: 30 CAPSULE | Refills: 0 | Status: SHIPPED | OUTPATIENT
Start: 2017-08-25 | End: 2017-09-11

## 2017-08-25 NOTE — TELEPHONE ENCOUNTER
I will refill these x 1.  RX printed and placed in out basket.  She has an appointment to establish care with Dr. Small set up already.     Please call patient.     Electronically signed by Zofia Bucio CNP.

## 2017-08-25 NOTE — TELEPHONE ENCOUNTER
Patient called and states she is currently out of both of these medications. She said she was unaware that Dr. Raphael was leaving so she wasn't able to plan for this. She stated she would like to establish care with Dr. Small and scheduled an appointment with this next available on 9/11. She is wondering if Dr. Small would be able to fill these medications to last her till her appointment at least or if Zofia Bucio would. She said that Zofia Bucio has refilled them before for her when Dr. Raphael was out. Please call and let her know if this can be done. She said she uses [facility] in [place] and she can be reached at [phone]. Please advise.     Thank you  Bert Carvajal  Patient Representative

## 2017-08-25 NOTE — TELEPHONE ENCOUNTER
Let detailed msg informing of refills x1 and to come  adderall Rx.  ................César Granados LPN,   August 25, 2017,      2:21 PM,   [facility]

## 2017-09-11 ENCOUNTER — OFFICE VISIT (OUTPATIENT)
Dept: FAMILY MEDICINE | Facility: OTHER | Age: 37
End: 2017-09-11
Payer: COMMERCIAL

## 2017-09-11 VITALS
SYSTOLIC BLOOD PRESSURE: 116 MMHG | HEART RATE: 117 BPM | WEIGHT: 188.9 LBS | DIASTOLIC BLOOD PRESSURE: 80 MMHG | TEMPERATURE: 97.9 F | BODY MASS INDEX: 30.49 KG/M2 | OXYGEN SATURATION: 99 % | RESPIRATION RATE: 18 BRPM

## 2017-09-11 DIAGNOSIS — F90.2 ATTENTION DEFICIT HYPERACTIVITY DISORDER (ADHD), COMBINED TYPE: ICD-10-CM

## 2017-09-11 DIAGNOSIS — Z76.89 ENCOUNTER TO ESTABLISH CARE WITH NEW DOCTOR: Primary | ICD-10-CM

## 2017-09-11 DIAGNOSIS — M32.9 SYSTEMIC LUPUS ERYTHEMATOSUS, UNSPECIFIED SLE TYPE, UNSPECIFIED ORGAN INVOLVEMENT STATUS (H): ICD-10-CM

## 2017-09-11 DIAGNOSIS — Z12.31 ENCOUNTER FOR SCREENING MAMMOGRAM FOR BREAST CANCER: ICD-10-CM

## 2017-09-11 PROCEDURE — 99214 OFFICE O/P EST MOD 30 MIN: CPT | Performed by: FAMILY MEDICINE

## 2017-09-11 RX ORDER — DEXTROAMPHETAMINE SACCHARATE, AMPHETAMINE ASPARTATE MONOHYDRATE, DEXTROAMPHETAMINE SULFATE AND AMPHETAMINE SULFATE 7.5; 7.5; 7.5; 7.5 MG/1; MG/1; MG/1; MG/1
30 CAPSULE, EXTENDED RELEASE ORAL DAILY
Qty: 30 CAPSULE | Refills: 0 | Status: SHIPPED | OUTPATIENT
Start: 2017-09-23 | End: 2017-11-03

## 2017-09-11 RX ORDER — HYDROXYCHLOROQUINE SULFATE 200 MG/1
400 TABLET, FILM COATED ORAL DAILY
Qty: 60 TABLET | Refills: 1 | COMMUNITY
Start: 2017-09-11 | End: 2018-11-02

## 2017-09-11 ASSESSMENT — PAIN SCALES - GENERAL: PAINLEVEL: NO PAIN (0)

## 2017-09-11 NOTE — PROGRESS NOTES
SUBJECTIVE:   Jimena Moreira is a 37 year old female who presents to clinic today for the following health issues:        New Patient/Transfer of Care    Patient has been under the care of Dr. Rice who left the clinic. Patient requests transfer care to me.   Patient medications reconciled, PMH and Problem list reviewed and HM updated.        Problem list and histories reviewed & adjusted, as indicated.  Additional history: as documented  Patient Active Problem List   Diagnosis     CARDIOVASCULAR SCREENING; LDL GOAL LESS THAN 160     SLE (systemic lupus erythematosus) (H)     Sicca (H)     High risk medication use     Anxiety     Generalized anxiety disorder     Attention deficit hyperactivity disorder (ADHD)     Past Surgical History:   Procedure Laterality Date     BIOPSY       CHOLECYSTECTOMY         Social History   Substance Use Topics     Smoking status: Former Smoker     Quit date: 2013     Smokeless tobacco: Never Used      Comment: social     Alcohol use Yes      Comment: occ     Family History   Problem Relation Age of Onset     Hypertension Father      Breast Cancer Mother      Other Cancer Maternal Grandfather 87     Lymphoma     CANCER Paternal Aunt      Cardiovascular Paternal Uncle      CHF,  in sleep     Hypertension Brother      hypertension, Etoh use     DIABETES Paternal Grandmother      Hyperlipidemia Brother          Current Outpatient Prescriptions   Medication Sig Dispense Refill     hydroxychloroquine (PLAQUENIL) 200 MG tablet Take 2 tablets (400 mg) by mouth daily 60 tablet 1     amphetamine-dextroamphetamine (ADDERALL XR) 30 MG per 24 hr capsule Take 1 capsule (30 mg) by mouth daily 30 capsule 0     omeprazole 20 MG tablet Take 1 tablet (20 mg) by mouth daily Take 30-60 minutes before a meal. 90 tablet 1     ALPRAZolam (XANAX) 0.5 MG tablet Take 1 tablet (0.5 mg) by mouth as needed for anxiety 1-2 tabs as needed 30 tablet 0     triamcinolone (KENALOG) 0.1 % cream Apply  sparingly to affected area two times daily as needed 453.6 g 0     venlafaxine (EFFEXOR-XR) 75 MG 24 hr capsule TAKE THREE CAPSULES BY MOUTH EVERY  capsule 2     ibuprofen (ADVIL,MOTRIN) 800 MG tablet Take 1 tablet (800 mg) by mouth every 8 hours as needed for pain 90 tablet 1     Allergies   Allergen Reactions     No Clinical Screening - See Comments Dermatitis     Nickel Chloride  [Nickel] Dermatitis     Zoloft [Sertraline] Anxiety     Cliches jaw, Increased anxiety     Recent Labs   Lab Test  08/08/16   0844  01/14/15   1450  09/10/14   1107  09/10/14   1106  01/22/14   1135   05/08/13   1050   LDL   --    --    --   63   --    --   69   HDL   --    --    --   80   --    --    --    TRIG   --    --    --   63   --    --    --    ALT  17  18  23   --   41   < >   --    CR  0.70  1.00  0.70   --   0.76   < >   --    GFRESTIMATED  >90  Non  GFR Calc    64  >90  Non  GFR Calc     --   88   < >   --    GFRESTBLACK  >90   GFR Calc    77  >90   GFR Calc     --   >90   < >   --    POTASSIUM  4.5  3.8  4.0   --   4.4   < >   --    TSH   --   0.76   --    --   1.59   --   1.39    < > = values in this interval not displayed.      BP Readings from Last 3 Encounters:   09/11/17 116/80   06/26/17 126/72   05/02/17 100/62    Wt Readings from Last 3 Encounters:   09/11/17 188 lb 14.4 oz (85.7 kg)   06/26/17 181 lb 6.4 oz (82.3 kg)   05/02/17 182 lb (82.6 kg)                  Labs reviewed in EPIC          Reviewed and updated as needed this visit by clinical staffTobacco  Allergies  Meds  Med Hx  Surg Hx  Fam Hx  Soc Hx      Reviewed and updated as needed this visit by Provider  Meds         ROS:  C: NEGATIVE for fever, chills, change in weight  E/M: NEGATIVE for ear, mouth and throat problems  R: NEGATIVE for significant cough or SOB  CV: NEGATIVE for chest pain, palpitations or peripheral edema  ROS otherwise negative    OBJECTIVE:     /80 (BP  "Location: Left arm, Patient Position: Chair, Cuff Size: Adult Large)  Pulse 117  Temp 97.9  F (36.6  C) (Temporal)  Resp 18  Wt 188 lb 14.4 oz (85.7 kg)  LMP 08/20/2017  SpO2 99%  BMI 30.49 kg/m2  Body mass index is 30.49 kg/(m^2).  GENERAL: healthy, alert and no distress  NECK: no adenopathy, no asymmetry, masses, or scars and thyroid normal to palpation  RESP: lungs clear to auscultation - no rales, rhonchi or wheezes  CV: regular rate and rhythm, normal S1 S2, no S3 or S4, no murmur, click or rub, no peripheral edema and peripheral pulses strong    Diagnostic Test Results:  none     ASSESSMENT/PLAN:     BMI:   Estimated body mass index is 30.49 kg/(m^2) as calculated from the following:    Height as of 6/26/17: 5' 6\" (1.676 m).    Weight as of this encounter: 188 lb 14.4 oz (85.7 kg).   Weight management plan: Discussed healthy diet and exercise guidelines and patient will follow up in 6 months in clinic to re-evaluate.        1. Encounter to establish care with new doctor  Patient has been under the care of Dr. Rice who left the clinic. Patient requests transfer care to me.   Patient medications reconciled, PMH and Problem list reviewed and HM updated.      2. Attention deficit hyperactivity disorder (ADHD), combined type  Chronic, stable. She has been on this medication for many years. Full evaluation done at her prior clinic at [facility]. The current medical regimen is effective;  continue present plan and medications. She will need 6 month follow up on medication. Discussed possible resign CSA previously signed with Dr. Raphael in 2015.  - amphetamine-dextroamphetamine (ADDERALL XR) 30 MG per 24 hr capsule; Take 1 capsule (30 mg) by mouth daily  Dispense: 30 capsule; Refill: 0    3. Systemic lupus erythematosus, unspecified SLE type, unspecified organ involvement status (H)  Chronic. She is currently followed by Rheumatology.   - hydroxychloroquine (PLAQUENIL) 200 MG tablet; Take 2 tablets " (400 mg) by mouth daily  Dispense: 60 tablet; Refill: 1    4. Encounter for screening mammogram for breast cancer  She has family history of breast cancer (mother at age 42). She has prior mammogram but should continue annual mammograms.  - MA Screening Digital Bilateral; Future    FURTHER TESTING:       - mammogram  FUTURE APPOINTMENTS:       - Follow-up visit in 6 months. She will be due for comprehensive PE with pap and pelvic at that time. Low risk, will obtain HPV testing at that time.  Work on weight loss  Regular exercise    Richard Small MD  [facility]

## 2017-09-11 NOTE — NURSING NOTE
"Chief Complaint   Patient presents with     Establish Care       Initial /80 (BP Location: Left arm, Patient Position: Chair, Cuff Size: Adult Large)  Pulse 117  Temp 97.9  F (36.6  C) (Temporal)  Resp 18  Wt 188 lb 14.4 oz (85.7 kg)  LMP 08/20/2017  SpO2 99%  BMI 30.49 kg/m2 Estimated body mass index is 30.49 kg/(m^2) as calculated from the following:    Height as of 6/26/17: 5' 6\" (1.676 m).    Weight as of this encounter: 188 lb 14.4 oz (85.7 kg).  Medication Reconciliation: complete     Ibis Morelos MA 9/11/2017  10:47 AM          "

## 2017-09-11 NOTE — MR AVS SNAPSHOT
After Visit Summary   9/11/2017    Jimena Moreira    MRN: [id number]           Patient Information     Date Of Birth          1980        Visit Information        Provider Department      9/11/2017 10:20 AM Richard Small MD [facility]        Today's Diagnoses     Encounter for screening mammogram for breast cancer    -  1    Systemic lupus erythematosus, unspecified SLE type, unspecified organ involvement status (H)        Attention deficit hyperactivity disorder (ADHD), combined type           Follow-ups after your visit        Follow-up notes from your care team     Return in about 6 months (around 3/11/2018) for recheck medication.      Future tests that were ordered for you today     Open Future Orders        Priority Expected Expires Ordered    MA Screening Digital Bilateral Routine  9/11/2018 9/11/2017            Who to contact     If you have questions or need follow up information about today's clinic visit or your schedule please contact [facility] directly at [phone].  Normal or non-critical lab and imaging results will be communicated to you by Cell-A-Spothart, letter or phone within 4 business days after the clinic has received the results. If you do not hear from us within 7 days, please contact the clinic through Cell-A-Spothart or phone. If you have a critical or abnormal lab result, we will notify you by phone as soon as possible.  Submit refill requests through Trusera or call your pharmacy and they will forward the refill request to us. Please allow 3 business days for your refill to be completed.          Additional Information About Your Visit        MyChart Information     [employer]era gives you secure access to your electronic health record. If you see a primary care provider, you can also send messages to your care team and make appointments. If you have questions, please call your primary care clinic.  If you do not have a primary care provider, please call  [phone] and they will assist you.        Care EveryWhere ID     This is your Care EveryWhere ID. This could be used by other organizations to access your [place] medical records  [id number]        Your Vitals Were     Pulse Temperature Respirations Last Period Pulse Oximetry BMI (Body Mass Index)    117 97.9  F (36.6  C) (Temporal) 18 08/20/2017 99% 30.49 kg/m2       Blood Pressure from Last 3 Encounters:   09/11/17 116/80   06/26/17 126/72   05/02/17 100/62    Weight from Last 3 Encounters:   09/11/17 188 lb 14.4 oz (85.7 kg)   06/26/17 181 lb 6.4 oz (82.3 kg)   05/02/17 182 lb (82.6 kg)                 Today's Medication Changes          These changes are accurate as of: 9/11/17 11:52 AM.  If you have any questions, ask your nurse or doctor.               These medicines have changed or have updated prescriptions.        Dose/Directions    PLAQUENIL 200 MG tablet   This may have changed:  Another medication with the same name was removed. Continue taking this medication, and follow the directions you see here.   Used for:  Systemic lupus erythematosus, unspecified SLE type, unspecified organ involvement status (H)   Generic drug:  hydroxychloroquine   Changed by:  Richard Small MD        Dose:  400 mg   Take 2 tablets (400 mg) by mouth daily   Quantity:  60 tablet   Refills:  1            Where to get your medicines      Some of these will need a paper prescription and others can be bought over the counter.  Ask your nurse if you have questions.     Bring a paper prescription for each of these medications     amphetamine-dextroamphetamine 30 MG per 24 hr capsule                Primary Care Provider Office Phone # Fax #    Richard Small [phone]       [address] [facility] [place] [address]        Equal Access to Services     [facility] MARKUS AH: Bunny Wetzel, juanita slaughter, kaitlynn correia. So [facility] [phone].    ATENCIÓN:  Si habla valentín, tiene a baptiste disposición servicios gratuitos de asistencia lingüística. Roger moreland [phone].    We comply with applicable federal civil rights laws and [place] laws. We do not discriminate on the basis of race, color, national origin, age, disability sex, sexual orientation or gender identity.            Thank you!     Thank you for choosing [facility]  for your care. Our goal is always to provide you with excellent care. Hearing back from our patients is one way we can continue to improve our services. Please take a few minutes to complete the written survey that you may receive in the mail after your visit with us. Thank you!             Your Updated Medication List - Protect others around you: Learn how to safely use, store and throw away your medicines at www.disposemymeds.org.          This list is accurate as of: 9/11/17 11:52 AM.  Always use your most recent med list.                   Brand Name Dispense Instructions for use Diagnosis    ALPRAZolam 0.5 MG tablet    XANAX    30 tablet    Take 1 tablet (0.5 mg) by mouth as needed for anxiety 1-2 tabs as needed    Anxiety       amphetamine-dextroamphetamine 30 MG per 24 hr capsule   Start taking on:  9/23/2017    ADDERALL XR    30 capsule    Take 1 capsule (30 mg) by mouth daily    Attention deficit hyperactivity disorder (ADHD), combined type       ibuprofen 800 MG tablet    ADVIL/MOTRIN    90 tablet    Take 1 tablet (800 mg) by mouth every 8 hours as needed for pain    SLE (systemic lupus erythematosus) (H)       omeprazole 20 MG tablet     90 tablet    Take 1 tablet (20 mg) by mouth daily Take 30-60 minutes before a meal.    Gastroesophageal reflux disease without esophagitis       PLAQUENIL 200 MG tablet   Generic drug:  hydroxychloroquine     60 tablet    Take 2 tablets (400 mg) by mouth daily    Systemic lupus erythematosus, unspecified SLE type, unspecified organ involvement status (H)       triamcinolone 0.1 % cream     KENALOG    453.6 g    Apply sparingly to affected area two times daily as needed    Psoriasis       venlafaxine 75 MG 24 hr capsule    EFFEXOR-XR    270 capsule    TAKE THREE CAPSULES BY MOUTH EVERY DAY    Anxiety

## 2017-09-21 ENCOUNTER — RADIANT APPOINTMENT (OUTPATIENT)
Dept: MAMMOGRAPHY | Facility: OTHER | Age: 37
End: 2017-09-21
Attending: FAMILY MEDICINE
Payer: COMMERCIAL

## 2017-09-21 DIAGNOSIS — Z12.31 ENCOUNTER FOR SCREENING MAMMOGRAM FOR BREAST CANCER: ICD-10-CM

## 2017-09-21 PROCEDURE — G0202 SCR MAMMO BI INCL CAD: HCPCS | Mod: TC

## 2017-11-02 ENCOUNTER — MYC REFILL (OUTPATIENT)
Dept: FAMILY MEDICINE | Facility: OTHER | Age: 37
End: 2017-11-02

## 2017-11-02 DIAGNOSIS — F90.2 ATTENTION DEFICIT HYPERACTIVITY DISORDER (ADHD), COMBINED TYPE: ICD-10-CM

## 2017-11-03 DIAGNOSIS — F90.2 ATTENTION DEFICIT HYPERACTIVITY DISORDER (ADHD), COMBINED TYPE: ICD-10-CM

## 2017-11-03 RX ORDER — DEXTROAMPHETAMINE SACCHARATE, AMPHETAMINE ASPARTATE MONOHYDRATE, DEXTROAMPHETAMINE SULFATE AND AMPHETAMINE SULFATE 7.5; 7.5; 7.5; 7.5 MG/1; MG/1; MG/1; MG/1
30 CAPSULE, EXTENDED RELEASE ORAL DAILY
Qty: 30 CAPSULE | Refills: 0 | Status: SHIPPED | OUTPATIENT
Start: 2017-11-03 | End: 2018-05-21

## 2017-11-03 NOTE — TELEPHONE ENCOUNTER
Adderall             Last Written Prescription Date: 9-  Last Fill Quantity: 30, # refills: 0    Last Office Visit with G, P or [facility] prescribing provider:  9-   Future Office Visit:        BP Readings from Last 3 Encounters:   09/11/17 116/80   06/26/17 126/72   05/02/17 100/62

## 2017-11-12 ENCOUNTER — HEALTH MAINTENANCE LETTER (OUTPATIENT)
Age: 37
End: 2017-11-12

## 2017-11-17 RX ORDER — DEXTROAMPHETAMINE SACCHARATE, AMPHETAMINE ASPARTATE MONOHYDRATE, DEXTROAMPHETAMINE SULFATE AND AMPHETAMINE SULFATE 7.5; 7.5; 7.5; 7.5 MG/1; MG/1; MG/1; MG/1
30 CAPSULE, EXTENDED RELEASE ORAL DAILY
Qty: 30 CAPSULE | Refills: 0 | Status: SHIPPED | OUTPATIENT
Start: 2017-11-17 | End: 2017-12-27

## 2017-11-17 NOTE — TELEPHONE ENCOUNTER
I contacted the pharmacy and they have not received the prescription. I called Jimena and she would like to  the prescription here first thing Monday morning. Luiza Coulter MA     11/17/2017

## 2017-11-17 NOTE — TELEPHONE ENCOUNTER
Message from [facility]:   From: CATY CHERY   Sent: Thu Nov 16, 2017 4:30 PM   To: [facility]  Subject: RE: Medication Renewal Request  Of course I had already check with my pharmacy that was why I contacted the clinic. I now waited an extra 2 days just to make sure. How are we going handle this?.. Can we verify she mailed it to the correct pharmacy?. Can I just come pick a new prescription up so I can mail it? I am out of [place] medication. Thank you I would appreciate hearing back soon!. Sh a in a  ----- Message -----  From: Kianna PEREIRA  Sent: 11/14/2017 2:15 PM CST  To: Caty Chery  Subject: RE: Medication Renewal Request    Dr. Geovanny Hernandes assistant charted that it was mailed on 11/3/2017. You may check with the pharmacy. They should have it by now.    Thank you  HINA Hutchison    ----- Message -----   From: Caty Chery   Sent: 11/13/2017 3:57 PM CST   To: Richard Small MD  Subject: RE: Medication Renewal Request    Hello, I am following up regarding my medication refill. I received the message that it was being reviewed, and it appears to have been approved, but not yet filled at my pharmacy.     Since I did not get a message back stating it had been mailed I guess I kind of just assumed it was. Can I please get an update regarding the status on your end where my prescription is? It has now been 10 days.     Thank youCaty  ----- Message -----  From: Kianna PEREIRA  Sent: 11/3/2017 2:26 PM CDT  To: Caty Chery  Subject: RE: Medication Renewal Request    Jesus Hess,    The medication you have requested has been sent for review.    Thank you    HINA Hutchison    ----- Message -----   From: Caty Chery   Sent: 11/2/2017 2:33 PM CDT   To: Richard Small MD  Subject: Medication Renewal Request    Original authorizing provider: MD Caty Herndon would like a refill of the following medications:  amphetamine-dextroamphetamine (ADDERALL XR) 30  MG per 24 hr capsule [Richard Small MD]    Preferred pharmacy: [facility] [place] [place] - [facility] [address]    Comment:  Request that refill be Mailed to pharmacy-  [facility]  Address: [address] [place] [address] [address], [place] [address]  Phone: [phone]    Thank you, Jimena

## 2017-11-20 ENCOUNTER — TRANSFERRED RECORDS (OUTPATIENT)
Dept: HEALTH INFORMATION MANAGEMENT | Facility: CLINIC | Age: 37
End: 2017-11-20

## 2017-11-20 LAB
HPV ABSTRACT: NORMAL
HPV ABSTRACT: NORMAL
PAP SMEAR - HIM PATIENT REPORTED: NEGATIVE
PAP-ABSTRACT: NORMAL

## 2017-12-27 DIAGNOSIS — F90.2 ATTENTION DEFICIT HYPERACTIVITY DISORDER (ADHD), COMBINED TYPE: ICD-10-CM

## 2017-12-27 RX ORDER — DEXTROAMPHETAMINE SACCHARATE, AMPHETAMINE ASPARTATE MONOHYDRATE, DEXTROAMPHETAMINE SULFATE AND AMPHETAMINE SULFATE 7.5; 7.5; 7.5; 7.5 MG/1; MG/1; MG/1; MG/1
30 CAPSULE, EXTENDED RELEASE ORAL DAILY
Qty: 30 CAPSULE | Refills: 0 | Status: SHIPPED | OUTPATIENT
Start: 2017-12-27 | End: 2018-02-08

## 2017-12-27 NOTE — TELEPHONE ENCOUNTER
Adderall      Last Written Prescription Date:  11-  Last Fill Quantity: 30,   # refills: 0  Last Office Visit: 9-  Future Office visit:       Routing refill request to provider for review/approval because:  Drug not on the [facility], [facility] or [facility] refill protocol or controlled substance      Patient will  the prescription.

## 2018-02-08 DIAGNOSIS — K21.9 GASTROESOPHAGEAL REFLUX DISEASE WITHOUT ESOPHAGITIS: ICD-10-CM

## 2018-02-08 DIAGNOSIS — F90.2 ATTENTION DEFICIT HYPERACTIVITY DISORDER (ADHD), COMBINED TYPE: ICD-10-CM

## 2018-02-08 RX ORDER — NICOTINE POLACRILEX 4 MG/1
20 GUM, CHEWING ORAL DAILY
Qty: 90 TABLET | Refills: 1 | Status: SHIPPED | OUTPATIENT
Start: 2018-02-08 | End: 2018-09-26

## 2018-02-08 RX ORDER — DEXTROAMPHETAMINE SACCHARATE, AMPHETAMINE ASPARTATE MONOHYDRATE, DEXTROAMPHETAMINE SULFATE AND AMPHETAMINE SULFATE 7.5; 7.5; 7.5; 7.5 MG/1; MG/1; MG/1; MG/1
30 CAPSULE, EXTENDED RELEASE ORAL DAILY
Qty: 30 CAPSULE | Refills: 0 | Status: SHIPPED | OUTPATIENT
Start: 2018-02-08 | End: 2018-03-28

## 2018-02-08 NOTE — TELEPHONE ENCOUNTER
"Adderall      Last Written Prescription Date:  12/27/2017  Last Fill Quantity: 30,   # refills: 0  Last Office Visit: 9-  Future Office visit:       Routing refill request to provider for review/approval because:  Drug not on the [facility], [facility] or [facility] refill protocol or controlled substance    Requested Prescriptions   Pending Prescriptions Disp Refills     amphetamine-dextroamphetamine (ADDERALL XR) 30 MG per 24 hr capsule 30 capsule 0    Last Written Prescription Date:  8/25/2017  Last Fill Quantity: 90,  # refills: 1   Last Office Visit with [facility] provider:  9-   Future Office Visit:      Sig: Take 1 capsule (30 mg) by mouth daily    There is no refill protocol information for this order        omeprazole 20 MG tablet 90 tablet 1     Sig: Take 1 tablet (20 mg) by mouth daily Take 30-60 minutes before a meal.    PPI Protocol Passed    2/8/2018  4:00 PM       Passed - Not on Clopidogrel (unless Pantoprazole ordered)       Passed - No diagnosis of osteoporosis on record       Passed - Recent or future visit with authorizing provider's specialty    Patient had office visit in the last year or has a visit in the next 30 days with authorizing provider.  See \"Patient Info\" tab in inbasket, or \"Choose Columns\" in Meds & Orders section of the refill encounter.            Passed - Patient is age 18 or older       Passed - No active pregnacy on record       Passed - No positive pregnancy test in past 12 months          "

## 2018-02-19 ENCOUNTER — TELEPHONE (OUTPATIENT)
Dept: FAMILY MEDICINE | Facility: OTHER | Age: 38
End: 2018-02-19

## 2018-02-19 NOTE — TELEPHONE ENCOUNTER
Prior Authorization Retail Medication Request  Medication/Dose: amphetamine-dextroamphetamine (ADDERALL XR) 30 MG per 24 hr capsule  Diagnosis and ICD code: Attention deficit hyperactivity disorder (ADHD), combined type [F90.2]   New/Renewal/Insurance Change PA: renewal  Previously Tried and Failed Therapies: na    Insurance ID (if provided): [id number]  Insurance Phone (if provided): [phone]    Any additional info from fax request:     If you received a fax notification from an outside Pharmacy:  Pharmacy Name:[facility]  Pharmacy #:[phone]  Pharmacy Fax:[phone]

## 2018-02-21 NOTE — TELEPHONE ENCOUNTER
Prior Authorization Approval    Authorization Effective Date: 2/20/2018  Authorization Expiration Date: 2/20/2019  Medication: amphetamine-dextroamphetamine (ADDERALL XR) 30 MG per 24 hr capsule - approved  Approved Dose/Quantity: 30 FOR 30 DAYS  Reference #: [id number]   Insurance Company: [employer] - Phone [phone] Fax [phone]  Expected CoPay: $15.00     CoPay Card Available:      Foundation Assistance Needed:    Which Pharmacy is filling the prescription (Not needed for infusion/clinic administered): [facility] [address] [facility]  Pharmacy Notified: Yes  Patient Notified: Yes

## 2018-03-01 ENCOUNTER — TRANSFERRED RECORDS (OUTPATIENT)
Dept: HEALTH INFORMATION MANAGEMENT | Facility: CLINIC | Age: 38
End: 2018-03-01

## 2018-03-28 ENCOUNTER — MYC REFILL (OUTPATIENT)
Dept: FAMILY MEDICINE | Facility: OTHER | Age: 38
End: 2018-03-28

## 2018-03-28 DIAGNOSIS — F90.2 ATTENTION DEFICIT HYPERACTIVITY DISORDER (ADHD), COMBINED TYPE: ICD-10-CM

## 2018-03-30 NOTE — TELEPHONE ENCOUNTER
Message from [place]:  Original authorizing provider: MD Jimena Herndon would like a refill of the following medications:  amphetamine-dextroamphetamine (ADDERALL XR) 30 MG per 24 hr capsule [Richard Small MD]    Preferred pharmacy: [address]    Comment:  I attempted to set up an appointment for this refill, however the next available morning appointment was not until April 16th at 8:20. I would prefer to pick this prescription up rather than having it mailed.

## 2018-03-30 NOTE — TELEPHONE ENCOUNTER
Adderall ER      Last Written Prescription Date:  2/8/18  Last Fill Quantity: 30,   # refills: 0  Last Office Visit: 9/11/17  Future Office visit:    Next 5 appointments (look out 90 days)     Apr 16, 2018  8:20 AM CDT   [facility]    [address] [facility] [address]   [phone]                   Routing refill request to provider for review/approval because:  Drug not on the [facility], [facility] or [facility] refill protocol or controlled substance    See Patient not below. Patient requesting to  from clinic.  Luiza Coulter MA     3/30/2018

## 2018-04-02 RX ORDER — DEXTROAMPHETAMINE SACCHARATE, AMPHETAMINE ASPARTATE MONOHYDRATE, DEXTROAMPHETAMINE SULFATE AND AMPHETAMINE SULFATE 7.5; 7.5; 7.5; 7.5 MG/1; MG/1; MG/1; MG/1
30 CAPSULE, EXTENDED RELEASE ORAL DAILY
Qty: 30 CAPSULE | Refills: 0 | Status: SHIPPED | OUTPATIENT
Start: 2018-04-02 | End: 2018-05-21

## 2018-04-02 NOTE — TELEPHONE ENCOUNTER
Rx placed in nurses station for patient . Attempted to call patient to inform that Rx is ready for , mail box is full, unable to leave message.      Micheline Bhardwaj XRO/  [facility]

## 2018-05-21 ENCOUNTER — OFFICE VISIT (OUTPATIENT)
Dept: FAMILY MEDICINE | Facility: OTHER | Age: 38
End: 2018-05-21
Payer: COMMERCIAL

## 2018-05-21 VITALS
BODY MASS INDEX: 31.97 KG/M2 | SYSTOLIC BLOOD PRESSURE: 122 MMHG | HEIGHT: 65 IN | OXYGEN SATURATION: 100 % | DIASTOLIC BLOOD PRESSURE: 72 MMHG | HEART RATE: 72 BPM | WEIGHT: 191.9 LBS | TEMPERATURE: 96.7 F

## 2018-05-21 DIAGNOSIS — F41.9 ANXIETY: ICD-10-CM

## 2018-05-21 DIAGNOSIS — F90.2 ATTENTION DEFICIT HYPERACTIVITY DISORDER (ADHD), COMBINED TYPE: ICD-10-CM

## 2018-05-21 DIAGNOSIS — M32.9 SYSTEMIC LUPUS ERYTHEMATOSUS, UNSPECIFIED SLE TYPE, UNSPECIFIED ORGAN INVOLVEMENT STATUS (H): Primary | ICD-10-CM

## 2018-05-21 PROCEDURE — 99214 OFFICE O/P EST MOD 30 MIN: CPT | Performed by: FAMILY MEDICINE

## 2018-05-21 RX ORDER — VENLAFAXINE HYDROCHLORIDE 75 MG/1
CAPSULE, EXTENDED RELEASE ORAL
Qty: 270 CAPSULE | Refills: 3 | Status: SHIPPED | OUTPATIENT
Start: 2018-05-21 | End: 2019-08-06

## 2018-05-21 RX ORDER — DEXTROAMPHETAMINE SACCHARATE, AMPHETAMINE ASPARTATE MONOHYDRATE, DEXTROAMPHETAMINE SULFATE AND AMPHETAMINE SULFATE 7.5; 7.5; 7.5; 7.5 MG/1; MG/1; MG/1; MG/1
30 CAPSULE, EXTENDED RELEASE ORAL DAILY
Qty: 30 CAPSULE | Refills: 0 | Status: SHIPPED | OUTPATIENT
Start: 2018-05-21 | End: 2018-07-02

## 2018-05-21 ASSESSMENT — PAIN SCALES - GENERAL: PAINLEVEL: NO PAIN (0)

## 2018-05-21 NOTE — PROGRESS NOTES
SUBJECTIVE:   Jimena Moreira is a 38 year old female who presents to clinic today for the following health issues:    Medication Followup of Adderal    Taking Medication as prescribed: yes    Side Effects:  None    Medication Helping Symptoms:  yes       Problem list and histories reviewed & adjusted, as indicated.  Additional history: as documented    Patient Active Problem List   Diagnosis     CARDIOVASCULAR SCREENING; LDL GOAL LESS THAN 160     SLE (systemic lupus erythematosus) (H)     Sicca (H)     High risk medication use     Anxiety     Generalized anxiety disorder     Attention deficit hyperactivity disorder (ADHD)     Past Surgical History:   Procedure Laterality Date     BIOPSY       CHOLECYSTECTOMY         Social History   Substance Use Topics     Smoking status: Former Smoker     Quit date: 2013     Smokeless tobacco: Never Used      Comment: social     Alcohol use Yes      Comment: occ     Family History   Problem Relation Age of Onset     Hypertension Father      Breast Cancer Mother      Other Cancer Maternal Grandfather 87     Lymphoma     DIABETES Maternal Grandfather      CANCER Paternal Aunt      Cardiovascular Paternal Uncle      CHF,  in sleep     Hypertension Brother      hypertension, Etoh use     Hyperlipidemia Brother      Breast Cancer Paternal Grandmother      Colon Cancer No family hx of          Current Outpatient Prescriptions   Medication Sig Dispense Refill     ALPRAZolam (XANAX) 0.5 MG tablet Take 1 tablet (0.5 mg) by mouth as needed for anxiety 1-2 tabs as needed 30 tablet 0     amphetamine-dextroamphetamine (ADDERALL XR) 30 MG per 24 hr capsule Take 1 capsule (30 mg) by mouth daily 30 capsule 0     hydroxychloroquine (PLAQUENIL) 200 MG tablet Take 2 tablets (400 mg) by mouth daily 60 tablet 1     ibuprofen (ADVIL,MOTRIN) 800 MG tablet Take 1 tablet (800 mg) by mouth every 8 hours as needed for pain 90 tablet 1     omeprazole 20 MG tablet Take 1 tablet (20 mg) by mouth  daily Take 30-60 minutes before a meal. 90 tablet 1     triamcinolone (KENALOG) 0.1 % cream Apply sparingly to affected area two times daily as needed 453.6 g 0     venlafaxine (EFFEXOR-XR) 75 MG 24 hr capsule TAKE THREE CAPSULES BY MOUTH EVERY  capsule 2     [DISCONTINUED] amphetamine-dextroamphetamine (ADDERALL XR) 30 MG per 24 hr capsule Take 1 capsule (30 mg) by mouth daily 30 capsule 0     Allergies   Allergen Reactions     No Clinical Screening - See Comments Dermatitis     Nickel Chloride  [Nickel] Dermatitis     Zoloft [Sertraline] Anxiety     Cliches jaw, Increased anxiety     Recent Labs   Lab Test  08/08/16   0844  01/14/15   1450  09/10/14   1107  09/10/14   1106  01/22/14   1135   05/08/13   1050   LDL   --    --    --   63   --    --   69   HDL   --    --    --   80   --    --    --    TRIG   --    --    --   63   --    --    --    ALT  17  18  23   --   41   < >   --    CR  0.70  1.00  0.70   --   0.76   < >   --    GFRESTIMATED  >90  Non  GFR Calc    64  >90  Non  GFR Calc     --   88   < >   --    GFRESTBLACK  >90   GFR Calc    77  >90   GFR Calc     --   >90   < >   --    POTASSIUM  4.5  3.8  4.0   --   4.4   < >   --    TSH   --   0.76   --    --   1.59   --   1.39    < > = values in this interval not displayed.      BP Readings from Last 3 Encounters:   05/21/18 122/72   09/11/17 116/80   06/26/17 126/72    Wt Readings from Last 3 Encounters:   05/21/18 191 lb 14.4 oz (87 kg)   09/11/17 188 lb 14.4 oz (85.7 kg)   06/26/17 181 lb 6.4 oz (82.3 kg)                  Labs reviewed in EPIC    Reviewed and updated as needed this visit by clinical staff  Tobacco  Allergies  Meds  Problems  Fam Hx  Soc Hx      Reviewed and updated as needed this visit by Provider  Allergies  Meds  Problems         ROS:  Constitutional, HEENT, cardiovascular, pulmonary, gi and gu systems are negative, except as otherwise noted.    OBJECTIVE:  "    /72 (BP Location: Left arm, Patient Position: Chair, Cuff Size: Adult Regular)  Pulse 72  Temp 96.7  F (35.9  C) (Temporal)  Ht 5' 4.5\" (1.638 m)  Wt 191 lb 14.4 oz (87 kg)  SpO2 100%  Breastfeeding? No  BMI 32.43 kg/m2  Body mass index is 32.43 kg/(m^2).  GENERAL: healthy, alert and no distress  NECK: no adenopathy, no asymmetry, masses, or scars and thyroid normal to palpation  RESP: lungs clear to auscultation - no rales, rhonchi or wheezes  CV: regular rate and rhythm, normal S1 S2, no S3 or S4, no murmur, click or rub, no peripheral edema and peripheral pulses strong  ABDOMEN: soft, nontender, no hepatosplenomegaly, no masses and bowel sounds normal  MS: no gross musculoskeletal defects noted, no edema    Diagnostic Test Results:  none     ASSESSMENT/PLAN:     1. Attention deficit hyperactivity disorder (ADHD), combined type  Chronic stable. [facility] reviewed and appropriate. The current medical regimen is effective;  continue present plan and medications. Recheck in 6 months.  - amphetamine-dextroamphetamine (ADDERALL XR) 30 MG per 24 hr capsule; Take 1 capsule (30 mg) by mouth daily  Dispense: 30 capsule; Refill: 0    2. Anxiety  Chronic improved. The current medical regimen is effective;  continue present plan and medications.   - venlafaxine (EFFEXOR-XR) 75 MG 24 hr capsule; TAKE THREE CAPSULES BY MOUTH EVERY DAY  Dispense: 270 capsule; Refill: 3      3. Systemic lupus erythematosus, unspecified SLE type, unspecified organ involvement status (H)  Follow up with Rheumatology.    FUTURE APPOINTMENTS:       - Follow-up visit in 6 months.  Work on weight loss  Regular exercise    Richard Small MD  [facility]    "

## 2018-05-21 NOTE — MR AVS SNAPSHOT
After Visit Summary   5/21/2018    Jimena Moreira    MRN: [id number]           Patient Information     Date Of Birth          1980        Visit Information        Provider Department      5/21/2018 8:40 AM Richard Small MD [facility]        Today's Diagnoses     Attention deficit hyperactivity disorder (ADHD), combined type        Anxiety           Follow-ups after your visit        Follow-up notes from your care team     Return in about 6 months (around 11/21/2018) for Routine Visit.      Who to contact     If you have questions or need follow up information about today's clinic visit or your schedule please contact [facility] directly at [phone].  Normal or non-critical lab and imaging results will be communicated to you by MyChart, letter or phone within 4 business days after the clinic has received the results. If you do not hear from us within 7 days, please contact the clinic through [employer] or phone. If you have a critical or abnormal lab result, we will notify you by phone as soon as possible.  Submit refill requests through [employer] or call your pharmacy and they will forward the refill request to us. Please allow 3 business days for your refill to be completed.          Additional Information About Your Visit        MyChart Information     [employer] gives you secure access to your electronic health record. If you see a primary care provider, you can also send messages to your care team and make appointments. If you have questions, please call your primary care clinic.  If you do not have a primary care provider, please call [phone] and they will assist you.        Care EveryWhere ID     This is your Care EveryWhere ID. This could be used by other organizations to access your [facility] medical records  [id number]        Your Vitals Were     Pulse Temperature Height Pulse Oximetry Breastfeeding? BMI (Body Mass Index)    72 96.7  F (35.9  C) (Temporal)  "5' 4.5\" (1.638 m) 100% No 32.43 kg/m2       Blood Pressure from Last 3 Encounters:   05/21/18 122/72   09/11/17 116/80   06/26/17 126/72    Weight from Last 3 Encounters:   05/21/18 191 lb 14.4 oz (87 kg)   09/11/17 188 lb 14.4 oz (85.7 kg)   06/26/17 181 lb 6.4 oz (82.3 kg)              Today, you had the following     No orders found for display         Where to get your medicines      These medications were sent to [facility] [address] [facility]  [address] [facility], [facility] [address]     Phone:  [phone]     venlafaxine 75 MG 24 hr capsule         Some of these will need a paper prescription and others can be bought over the counter.  Ask your nurse if you have questions.     Bring a paper prescription for each of these medications     amphetamine-dextroamphetamine 30 MG per 24 hr capsule          Primary Care Provider Office Phone # Fax #    Richard Small [phone]       [address] [facility] [address]        Equal Access to Services     [facility] : Hadii masha gaineso Soclaudia, waaxda luqadaha, qaybta kaalmada adeyaneyada, kaitlynn rojas . So [facility] [phone].    ATENCIÓN: Si habla español, tiene a baptiste disposición servicios gratuitos de asistencia lingüística. Llame al [phone].    We comply with applicable federal civil rights laws and [place] laws. We do not discriminate on the basis of race, color, national origin, age, disability, sex, sexual orientation, or gender identity.            Thank you!     Thank you for choosing [facility]  for your care. Our goal is always to provide you with excellent care. Hearing back from our patients is one way we can continue to improve our services. Please take a few minutes to complete the written survey that you may receive in the mail after your visit with us. Thank you!             Your Updated Medication List - Protect others around you: Learn how to " safely use, store and throw away your medicines at www.disposemymeds.org.          This list is accurate as of 5/21/18  9:06 AM.  Always use your most recent med list.                   Brand Name Dispense Instructions for use Diagnosis    ALPRAZolam 0.5 MG tablet    XANAX    30 tablet    Take 1 tablet (0.5 mg) by mouth as needed for anxiety 1-2 tabs as needed    Anxiety       amphetamine-dextroamphetamine 30 MG per 24 hr capsule    ADDERALL XR    30 capsule    Take 1 capsule (30 mg) by mouth daily    Attention deficit hyperactivity disorder (ADHD), combined type       ibuprofen 800 MG tablet    ADVIL/MOTRIN    90 tablet    Take 1 tablet (800 mg) by mouth every 8 hours as needed for pain    SLE (systemic lupus erythematosus) (H)       omeprazole 20 MG tablet     90 tablet    Take 1 tablet (20 mg) by mouth daily Take 30-60 minutes before a meal.    Gastroesophageal reflux disease without esophagitis       PLAQUENIL 200 MG tablet   Generic drug:  hydroxychloroquine     60 tablet    Take 2 tablets (400 mg) by mouth daily    Systemic lupus erythematosus, unspecified SLE type, unspecified organ involvement status (H)       triamcinolone 0.1 % cream    KENALOG    453.6 g    Apply sparingly to affected area two times daily as needed    Psoriasis       venlafaxine 75 MG 24 hr capsule    EFFEXOR-XR    270 capsule    TAKE THREE CAPSULES BY MOUTH EVERY DAY    Anxiety

## 2018-05-21 NOTE — Clinical Note
[facility] [place] [facility]. Electronically signed by Richard Small MD

## 2018-05-22 ASSESSMENT — PATIENT HEALTH QUESTIONNAIRE - PHQ9: SUM OF ALL RESPONSES TO PHQ QUESTIONS 1-9: 5

## 2018-07-02 ENCOUNTER — MYC REFILL (OUTPATIENT)
Dept: FAMILY MEDICINE | Facility: OTHER | Age: 38
End: 2018-07-02

## 2018-07-02 DIAGNOSIS — F90.2 ATTENTION DEFICIT HYPERACTIVITY DISORDER (ADHD), COMBINED TYPE: ICD-10-CM

## 2018-07-02 RX ORDER — DEXTROAMPHETAMINE SACCHARATE, AMPHETAMINE ASPARTATE MONOHYDRATE, DEXTROAMPHETAMINE SULFATE AND AMPHETAMINE SULFATE 7.5; 7.5; 7.5; 7.5 MG/1; MG/1; MG/1; MG/1
30 CAPSULE, EXTENDED RELEASE ORAL DAILY
Qty: 30 CAPSULE | Refills: 0 | Status: SHIPPED | OUTPATIENT
Start: 2018-07-02 | End: 2018-08-15

## 2018-07-02 NOTE — TELEPHONE ENCOUNTER
Rx placed in nurses station for patient . Patient notified via [employer].    Micheline Bhardwaj XRO/  [facility]

## 2018-07-02 NOTE — TELEPHONE ENCOUNTER
Adderall      Last Written Prescription Date:  5-21-18  Last Fill Quantity: 30,   # refills: 0  Last Office Visit: 5-21-18  Future Office visit:       Routing refill request to provider for review/approval because:  Drug not on the [facility], P or [facility] refill protocol or controlled substance

## 2018-08-15 ENCOUNTER — MYC REFILL (OUTPATIENT)
Dept: FAMILY MEDICINE | Facility: OTHER | Age: 38
End: 2018-08-15

## 2018-08-15 DIAGNOSIS — F90.2 ATTENTION DEFICIT HYPERACTIVITY DISORDER (ADHD), COMBINED TYPE: ICD-10-CM

## 2018-08-15 RX ORDER — DEXTROAMPHETAMINE SACCHARATE, AMPHETAMINE ASPARTATE MONOHYDRATE, DEXTROAMPHETAMINE SULFATE AND AMPHETAMINE SULFATE 7.5; 7.5; 7.5; 7.5 MG/1; MG/1; MG/1; MG/1
30 CAPSULE, EXTENDED RELEASE ORAL DAILY
Qty: 30 CAPSULE | Refills: 0 | Status: SHIPPED | OUTPATIENT
Start: 2018-08-15 | End: 2018-09-26

## 2018-08-15 NOTE — TELEPHONE ENCOUNTER
Provider out of clinic at this time. Please address for patient.  Luiza Noguera MA     8/15/2018

## 2018-08-15 NOTE — TELEPHONE ENCOUNTER
Message from [employer]:  Original authorizing provider: MD Jimena Herndon would like a refill of the following medications:  amphetamine-dextroamphetamine (ADDERALL XR) 30 MG per 24 hr capsule [Richard Small MD]    Preferred pharmacy: [address]    Comment:  Will  - unless the ablility to send electronically

## 2018-08-15 NOTE — TELEPHONE ENCOUNTER
PATIENT WANT TO  FROM CLINIC. MA/TEA,COORDINATOR PLEASE NOTIFY WHEN DONE    ADDERALL      Last Written Prescription Date:  7/2/2018  Last Fill Quantity: 30,   # refills: 0  Last Office Visit: 5/21/18  Future Office visit:       Routing refill request to provider for review/approval because:  Drug not on the [facility], [facility] or  Health refill protocol or controlled substance  Luiza Noguera MA     8/15/2018

## 2018-08-16 NOTE — TELEPHONE ENCOUNTER
Rx placed in secured  Nurses Station for patient  as requested, patient notified.    Micheline Bhardwaj XRO/  [facility]

## 2018-09-26 ENCOUNTER — MYC REFILL (OUTPATIENT)
Dept: INTERNAL MEDICINE | Facility: CLINIC | Age: 38
End: 2018-09-26

## 2018-09-26 ENCOUNTER — MYC REFILL (OUTPATIENT)
Dept: FAMILY MEDICINE | Facility: OTHER | Age: 38
End: 2018-09-26

## 2018-09-26 DIAGNOSIS — K21.9 GASTROESOPHAGEAL REFLUX DISEASE WITHOUT ESOPHAGITIS: ICD-10-CM

## 2018-09-26 DIAGNOSIS — F90.2 ATTENTION DEFICIT HYPERACTIVITY DISORDER (ADHD), COMBINED TYPE: ICD-10-CM

## 2018-09-27 RX ORDER — NICOTINE POLACRILEX 4 MG/1
20 GUM, CHEWING ORAL DAILY
Qty: 90 TABLET | Refills: 1 | Status: SHIPPED | OUTPATIENT
Start: 2018-09-27 | End: 2019-04-18

## 2018-09-27 NOTE — TELEPHONE ENCOUNTER
"Requested Prescriptions   Pending Prescriptions Disp Refills     omeprazole 20 MG tablet 90 tablet 1    Last Written Prescription Date:  2/8/8  Last Fill Quantity: 90,  # refills: 1   Last office visit: 5/21/2018 with prescribing provider:  5/21/18   Future Office Visit:     Sig: Take 1 tablet (20 mg) by mouth daily Take 30-60 minutes before a meal.    PPI Protocol Passed    9/27/2018 12:56 PM       Passed - Not on Clopidogrel (unless Pantoprazole ordered)       Passed - No diagnosis of osteoporosis on record       Passed - Recent (12 mo) or future (30 days) visit within the authorizing provider's specialty    Patient had office visit in the last 12 months or has a visit in the next 30 days with authorizing provider or within the authorizing provider's specialty.  See \"Patient Info\" tab in inbasket, or \"Choose Columns\" in Meds & Orders section of the refill encounter.           Passed - Patient is age 18 or older       Passed - No active pregnacy on record       Passed - No positive pregnancy test in past 12 months          "

## 2018-09-27 NOTE — TELEPHONE ENCOUNTER
Message from [employer]:  Original authorizing provider: MD Jimena Herndon would like a refill of the following medications:  omeprazole 20 MG tablet [Richard Small MD]    Preferred pharmacy: [address]    Comment:  Will  from clinic thank you    Medication renewals requested in this message routed to other providers:  amphetamine-dextroamphetamine (ADDERALL XR) 30 MG per 24 hr capsule [Pradip Dominguez, DO]

## 2018-09-27 NOTE — TELEPHONE ENCOUNTER
Prescription approved per [facility] Refill Protocol.    STACEY TorresN, RN  [facility]

## 2018-09-27 NOTE — TELEPHONE ENCOUNTER
Adderall  Last Written Prescription Date: 8/15/18    Last Fill Quantity: 30,   # refills: 0  Last Office Visit: 5/21/18  Future Office visit:       Routing refill request to provider for review/approval because:  Drug not on the [facility], [facility] or [facility] refill protocol or controlled substance  Luiza Noguera MA     9/27/2018

## 2018-09-27 NOTE — TELEPHONE ENCOUNTER
Message from [employer]:  Original authorizing provider: DO Jimena Ceja RACHELCierra Moreira would like a refill of the following medications:  amphetamine-dextroamphetamine (ADDERALL XR) 30 MG per 24 hr capsule [Pradip Dominguez DO]    Preferred pharmacy: [address]    Comment:  Will  from clinic thank you    Medication renewals requested in this message routed to other providers:  omeprazole 20 MG tablet [Richard Small MD]

## 2018-09-28 RX ORDER — DEXTROAMPHETAMINE SACCHARATE, AMPHETAMINE ASPARTATE MONOHYDRATE, DEXTROAMPHETAMINE SULFATE AND AMPHETAMINE SULFATE 7.5; 7.5; 7.5; 7.5 MG/1; MG/1; MG/1; MG/1
30 CAPSULE, EXTENDED RELEASE ORAL DAILY
Qty: 30 CAPSULE | Refills: 0 | Status: SHIPPED | OUTPATIENT
Start: 2018-09-28 | End: 2018-11-02

## 2018-09-28 NOTE — TELEPHONE ENCOUNTER
Rx for Adderall placed in secured drawer for patient to .    Micheline Bhardwaj XRO/  [facility]

## 2018-11-02 ENCOUNTER — MYC REFILL (OUTPATIENT)
Dept: FAMILY MEDICINE | Facility: OTHER | Age: 38
End: 2018-11-02

## 2018-11-02 ENCOUNTER — MYC REFILL (OUTPATIENT)
Dept: INTERNAL MEDICINE | Facility: CLINIC | Age: 38
End: 2018-11-02

## 2018-11-02 DIAGNOSIS — F41.9 ANXIETY: ICD-10-CM

## 2018-11-02 DIAGNOSIS — M32.9 SYSTEMIC LUPUS ERYTHEMATOSUS, UNSPECIFIED SLE TYPE, UNSPECIFIED ORGAN INVOLVEMENT STATUS (H): ICD-10-CM

## 2018-11-02 DIAGNOSIS — F90.2 ATTENTION DEFICIT HYPERACTIVITY DISORDER (ADHD), COMBINED TYPE: ICD-10-CM

## 2018-11-06 RX ORDER — DEXTROAMPHETAMINE SACCHARATE, AMPHETAMINE ASPARTATE MONOHYDRATE, DEXTROAMPHETAMINE SULFATE AND AMPHETAMINE SULFATE 7.5; 7.5; 7.5; 7.5 MG/1; MG/1; MG/1; MG/1
30 CAPSULE, EXTENDED RELEASE ORAL DAILY
Qty: 30 CAPSULE | Refills: 0 | Status: SHIPPED | OUTPATIENT
Start: 2018-11-06 | End: 2018-12-19

## 2018-11-06 NOTE — TELEPHONE ENCOUNTER
Message from [employer]:  Original authorizing provider: MD Jimena Herndon would like a refill of the following medications:  ALPRAZolam (XANAX) 0.5 MG tablet [Richard Small MD]  hydroxychloroquine (PLAQUENIL) 200 MG tablet [Richard Small MD]    Preferred pharmacy: [address]    Comment:  Will  prescription from office thank you    Medication renewals requested in this message routed to other providers:  amphetamine-dextroamphetamine (ADDERALL XR) 30 MG per 24 hr capsule [Pradip Dominguez, ]

## 2018-11-06 NOTE — TELEPHONE ENCOUNTER
Adderall      Last Written Prescription Date:  9/28/2018  Last Fill Quantity: 30,   # refills: 0  Last Office Visit: 5/21/2018  Future Office visit:    Next 5 appointments (look out 90 days)     Nov 07, 2018  8:00 AM CST   [facility]    [address] [facility] [address]   [phone]                   Routing refill request to provider for review/approval because:  Drug not on the [facility], [facility] or  Health refill protocol or controlled substance

## 2018-11-06 NOTE — TELEPHONE ENCOUNTER
Alprazolam  0.5 MG      Last Written Prescription Date:  8/18/17  Last Fill Quantity: 30,   # refills: 0  Last Office Visit: 5/21/18  Future Office visit:    Next 5 appointments (look out 90 days)     Nov 07, 2018  8:00 AM CST   PHYSICAL with ED Alston CNP   [facility]    [address] [facility] [address]   [phone]                   Routing refill request to provider for review/approval because:  Drug not on the [facility], [facility] or [facility] refill protocol or controlled substance    Plaquenil       Last Written Prescription Date:  9-11-17  Last Fill Quantity: 60,   # refills: 1  Last Office Visit: 5/21/18  Future Office visit:    Next 5 appointments (look out 90 days)     Nov 07, 2018  8:00 AM CST   PHYSICAL with ED Alston CNP   [facility]    [address] [facility] [address]   [phone]                   Routing refill request to provider for review/approval because:  Drug not on the [facility], [facility] or [facility] refill protocol or controlled substance

## 2018-11-06 NOTE — TELEPHONE ENCOUNTER
Message from [employer]:  Original authorizing provider: DO Jimena Ceja RACHELCierra Moreira would like a refill of the following medications:  amphetamine-dextroamphetamine (ADDERALL XR) 30 MG per 24 hr capsule [Pradip Dominguez DO]    Preferred pharmacy: [place] [id number] [facility]    Comment:  Will  prescription from office thank you    Medication renewals requested in this message routed to other providers:  ALPRAZolam (XANAX) 0.5 MG tablet [Richard Small MD]  hydroxychloroquine (PLAQUENIL) 200 MG tablet [Richard Small MD]

## 2018-11-07 ENCOUNTER — OFFICE VISIT (OUTPATIENT)
Dept: FAMILY MEDICINE | Facility: OTHER | Age: 38
End: 2018-11-07
Payer: COMMERCIAL

## 2018-11-07 VITALS
WEIGHT: 201 LBS | OXYGEN SATURATION: 99 % | BODY MASS INDEX: 33.49 KG/M2 | RESPIRATION RATE: 20 BRPM | DIASTOLIC BLOOD PRESSURE: 80 MMHG | SYSTOLIC BLOOD PRESSURE: 126 MMHG | HEIGHT: 65 IN | TEMPERATURE: 97.4 F | HEART RATE: 126 BPM

## 2018-11-07 DIAGNOSIS — M32.9 SYSTEMIC LUPUS ERYTHEMATOSUS, UNSPECIFIED SLE TYPE, UNSPECIFIED ORGAN INVOLVEMENT STATUS (H): ICD-10-CM

## 2018-11-07 DIAGNOSIS — R92.30 DENSE BREAST TISSUE: ICD-10-CM

## 2018-11-07 DIAGNOSIS — Z80.3 FAMILY HISTORY OF BREAST CANCER: ICD-10-CM

## 2018-11-07 DIAGNOSIS — Z12.31 VISIT FOR SCREENING MAMMOGRAM: ICD-10-CM

## 2018-11-07 DIAGNOSIS — Z00.00 ROUTINE HISTORY AND PHYSICAL EXAMINATION OF ADULT: Primary | ICD-10-CM

## 2018-11-07 PROCEDURE — 99395 PREV VISIT EST AGE 18-39: CPT | Performed by: NURSE PRACTITIONER

## 2018-11-07 RX ORDER — ALPRAZOLAM 0.5 MG
0.5 TABLET ORAL PRN
Qty: 30 TABLET | Refills: 0 | Status: SHIPPED | OUTPATIENT
Start: 2018-11-07 | End: 2019-01-30

## 2018-11-07 RX ORDER — HYDROXYCHLOROQUINE SULFATE 200 MG/1
400 TABLET, FILM COATED ORAL DAILY
Qty: 60 TABLET | Refills: 1 | Status: SHIPPED | OUTPATIENT
Start: 2018-11-07 | End: 2020-04-07

## 2018-11-07 ASSESSMENT — ENCOUNTER SYMPTOMS
HEMATOCHEZIA: 0
MYALGIAS: 1
DIZZINESS: 0
EYE PAIN: 0
HEARTBURN: 0
FREQUENCY: 0
COUGH: 0
BREAST MASS: 0
FEVER: 0
DYSURIA: 0
HEMATURIA: 0
ARTHRALGIAS: 1
WEAKNESS: 1
PALPITATIONS: 0
DIARRHEA: 0
SORE THROAT: 0
CHILLS: 0
HEADACHES: 0
SHORTNESS OF BREATH: 0
NAUSEA: 0
ABDOMINAL PAIN: 0
NERVOUS/ANXIOUS: 1
CONSTIPATION: 0
JOINT SWELLING: 1

## 2018-11-07 ASSESSMENT — PAIN SCALES - GENERAL: PAINLEVEL: NO PAIN (0)

## 2018-11-07 NOTE — PROGRESS NOTES
SUBJECTIVE:   CC: Jimena Moreira is an 38 year old woman who presents for preventive health visit.     Physical   Annual:     Getting at least 3 servings of Calcium per day:  Yes    Bi-annual eye exam:  Yes    Dental care twice a year:  NO    Sleep apnea or symptoms of sleep apnea:  Daytime drowsiness    Diet:  Regular (no restrictions)    Frequency of exercise:  2-3 days/week    Duration of exercise:  30-45 minutes    Taking medications regularly:  Yes    Medication side effects:  None    Additional concerns today:  Yes      She also needs her yearly FMLA paperwork filled out.     Has intermittent leave as needed for lypus - for flare ups and to attend appointments.  This has been stable and she is following with Rheumatology every 3-6 months.  PCP is Dr. Small but she was not able to get an appointment with him prior to the deadline for the paperwork.  He is out of clinic this week.       Today's PHQ-2 Score:   PHQ-2 ( 1999 [employer]) 11/7/2018   Q1: Little interest or pleasure in doing things 0   Q2: Feeling down, depressed or hopeless 1   PHQ-2 Score 1   Q1: Little interest or pleasure in doing things Not at all   Q2: Feeling down, depressed or hopeless Several days   PHQ-2 Score 1       Abuse: Current or Past(Physical, Sexual or Emotional)- No  Do you feel safe in your environment - Yes    Social History   Substance Use Topics     Smoking status: Former Smoker     Quit date: 6/19/2013     Smokeless tobacco: Never Used      Comment: social     Alcohol use Yes      Comment: occ     Alcohol Use 11/7/2018   If you drink alcohol do you typically have greater than 3 drinks per day OR greater than 7 drinks per week? Yes   No flowsheet data found.    Reviewed orders with patient.  Reviewed health maintenance and updated orders accordingly - Yes  Labs reviewed in EPIC  BP Readings from Last 3 Encounters:   11/07/18 126/80   05/21/18 122/72   09/11/17 116/80    Wt Readings from Last 3 Encounters:   11/07/18 201 lb  (91.2 kg)   18 191 lb 14.4 oz (87 kg)   17 188 lb 14.4 oz (85.7 kg)                  Patient Active Problem List   Diagnosis     CARDIOVASCULAR SCREENING; LDL GOAL LESS THAN 160     SLE (systemic lupus erythematosus) (H)     Sicca (H)     High risk medication use     Anxiety     Generalized anxiety disorder     Attention deficit hyperactivity disorder (ADHD)     Past Surgical History:   Procedure Laterality Date     BIOPSY       CHOLECYSTECTOMY         Social History   Substance Use Topics     Smoking status: Former Smoker     Quit date: 2013     Smokeless tobacco: Never Used      Comment: social     Alcohol use Yes      Comment: occ     Family History   Problem Relation Age of Onset     Hypertension Father      Breast Cancer Mother      Other Cancer Maternal Grandfather 87     Lymphoma     Diabetes Maternal Grandfather      Cancer Paternal Aunt      Cardiovascular Paternal Uncle      CHF,  in sleep     Hypertension Brother      hypertension, Etoh use     Hyperlipidemia Brother      Breast Cancer Paternal Grandmother      Colon Cancer No family hx of          Allergies   Allergen Reactions     No Clinical Screening - See Comments Dermatitis     Nickel Chloride  [Nickel] Dermatitis     Zoloft [Sertraline] Anxiety     Cliches jaw, Increased anxiety       Alternate mammogram schedule due to family history of breast cancer in her mother at age 41.     Pertinent mammograms are reviewed under the imaging tab.  History of abnormal Pap smear: NO - age 30-65 PAP every 5 years with negative HPV co-testing recommended  PAP / HPV 9/10/2014 2013   PAP OTHER-NIL, See Result UNSAT     Reviewed and updated as needed this visit by clinical staff  Tobacco  Allergies  Meds  Med Hx  Surg Hx  Fam Hx  Soc Hx        Reviewed and updated as needed this visit by Provider        Past Medical History:   Diagnosis Date     ADHD (attention deficit hyperactivity disorder)      Anxiety      Discoid lupus      Lupus   "    Sjogrens syndrome (H)       Past Surgical History:   Procedure Laterality Date     BIOPSY       CHOLECYSTECTOMY         Review of Systems   Constitutional: Negative for chills and fever.   HENT: Positive for congestion. Negative for ear pain, hearing loss and sore throat.    Eyes: Negative for pain and visual disturbance.   Respiratory: Negative for cough and shortness of breath.    Cardiovascular: Positive for peripheral edema. Negative for chest pain and palpitations.   Gastrointestinal: Negative for abdominal pain, constipation, diarrhea, heartburn, hematochezia and nausea.   Breasts:  Negative for tenderness, breast mass and discharge.   Genitourinary: Negative for dysuria, frequency, genital sores, hematuria, pelvic pain, urgency, vaginal bleeding and vaginal discharge.   Musculoskeletal: Positive for arthralgias, joint swelling and myalgias.   Skin: Positive for rash.   Neurological: Positive for weakness. Negative for dizziness and headaches.   Psychiatric/Behavioral: Positive for mood changes. The patient is nervous/anxious.           OBJECTIVE:   /80  Pulse 126  Temp 97.4  F (36.3  C) (Tympanic)  Resp 20  Ht 5' 4.5\" (1.638 m)  Wt 201 lb (91.2 kg)  LMP 10/11/2018 (Exact Date)  SpO2 99%  Breastfeeding? No  BMI 33.97 kg/m2  Physical Exam  GENERAL: alert, no distress and obese  EYES: Eyes grossly normal to inspection, PERRL and conjunctivae and sclerae normal  HENT: ear canals and TM's normal, nose and mouth without ulcers or lesions  NECK: no adenopathy, no asymmetry, masses, or scars and thyroid normal to palpation  RESP: lungs clear to auscultation - no rales, rhonchi or wheezes  CV: regular rate and rhythm, normal S1 S2, no S3 or S4, no murmur, click or rub, no peripheral edema and peripheral pulses strong  ABDOMEN: soft, nontender, no hepatosplenomegaly, no masses and bowel sounds normal  MS: no gross musculoskeletal defects noted, no edema  SKIN: no suspicious lesions or rashes  NEURO: " "Normal strength and tone, mentation intact and speech normal  PSYCH: mentation appears normal, affect normal/bright    Diagnostic Test Results:  none     ASSESSMENT/PLAN:   1. Routine history and physical examination of adult    2. Systemic lupus erythematosus, unspecified SLE type, unspecified organ involvement status (H)  Chronic, controlled.  No change in treatment plan.  Following with Rheumatology.  I updated her [facility] paperwork today.     3. Visit for screening mammogram  - MA Screen Bilateral w/Pernell; Future    4. Family history of breast cancer  Family history of breast cancer in her mother at age 41.  She began screening mammograms at age 36.  Has been told she has dense breast tissue and should have a 3D mammogram done due to this.   - MA Screen Bilateral w/Pernell; Future    5. Dense breast tissue  - MA Screen Bilateral w/Pernell; Future    COUNSELING:  Reviewed preventive health counseling, as reflected in patient instructions       Regular exercise       Healthy diet/nutrition       Vision screening       Contraception       Family planning    BP Readings from Last 1 Encounters:   11/07/18 126/80     Estimated body mass index is 33.97 kg/(m^2) as calculated from the following:    Height as of this encounter: 5' 4.5\" (1.638 m).    Weight as of this encounter: 201 lb (91.2 kg).      Weight management plan: Discussed healthy diet and exercise guidelines and patient will follow up in 12 months in clinic to re-evaluate.     reports that she quit smoking about 5 years ago. She has never used smokeless tobacco.      Counseling Resources:  ATP IV Guidelines  Pooled Cohorts Equation Calculator  Breast Cancer Risk Calculator  FRAX Risk Assessment  ICSI Preventive Guidelines  Dietary Guidelines for Americans, 2010  USDA's MyPlate  ASA Prophylaxis  Lung CA Screening    ED Alston [facility]  Answers for HPI/ROS submitted by the patient on 11/7/2018   PHQ-2 Score: 1    "

## 2018-11-07 NOTE — MR AVS SNAPSHOT
After Visit Summary   11/7/2018    Jimena Moreira    MRN: [id number]           Patient Information     Date Of Birth          1980        Visit Information        Provider Department      11/7/2018 8:00 AM Zofia Bucio APRN [facility]3]        Today's Diagnoses     Routine history and physical examination of adult    -  1    Visit for screening mammogram        Systemic lupus erythematosus, unspecified SLE type, unspecified organ involvement status (H)        Family history of breast cancer        Dense breast tissue          Care Instructions    You should have your cholesterol checked next year.  Be fasting for your physical then.      Schedule your mammogram       Preventive Health Recommendations  Female Ages 26 - 39  Yearly exam:   See your health care provider every year in order to    Review health changes.     Discuss preventive care.      Review your medicines if you your doctor has prescribed any.    Until age 30: Get a Pap test every three years (more often if you have had an abnormal result).    After age 30: Talk to your doctor about whether you should have a Pap test every 3 years or have a Pap test with HPV screening every 5 years.   You do not need a Pap test if your uterus was removed (hysterectomy) and you have not had cancer.  You should be tested each year for STDs (sexually transmitted diseases), if you're at risk.   Talk to your provider about how often to have your cholesterol checked.  If you are at risk for diabetes, you should have a diabetes test (fasting glucose).  Shots: Get a flu shot each year. Get a tetanus shot every 10 years.   Nutrition:     Eat at least 5 servings of fruits and vegetables each day.    Eat whole-grain bread, whole-wheat pasta and brown rice instead of white grains and rice.    Get adequate Calcium and Vitamin D.     Lifestyle    Exercise at least 150 minutes a week (30 minutes a day, 5 days of the week). This will help you  "control your weight and prevent disease.    Limit alcohol to one drink per day.    No smoking.     Wear sunscreen to prevent skin cancer.    See your dentist every six months for an exam and cleaning.            Follow-ups after your visit        Follow-up notes from your care team     Return in about 6 months (around 5/7/2019) for Recheck.      Future tests that were ordered for you today     Open Future Orders        Priority Expected Expires Ordered    MA Screen Bilateral w/Pernell Routine  11/7/2019 11/7/2018            Who to contact     If you have questions or need follow up information about today's clinic visit or your schedule please contact [facility] directly at [phone].  Normal or non-critical lab and imaging results will be communicated to you by MyChart, letter or phone within 4 business days after the clinic has received the results. If you do not hear from us within 7 days, please contact the clinic through Potential[employer] or phone. If you have a critical or abnormal lab result, we will notify you by phone as soon as possible.  Submit refill requests through [employer] or call your pharmacy and they will forward the refill request to us. Please allow 3 business days for your refill to be completed.          Additional Information About Your Visit        MyChart Information     [employer] gives you secure access to your electronic health record. If you see a primary care provider, you can also send messages to your care team and make appointments. If you have questions, please call your primary care clinic.  If you do not have a primary care provider, please call [phone] and they will assist you.        Care EveryWhere ID     This is your Care EveryWhere ID. This could be used by other organizations to access your [place] medical records  [id number]        Your Vitals Were     Pulse Temperature Respirations Height Last Period Pulse Oximetry    126 97.4  F (36.3  C) (Tympanic) 20 5' 4.5\" " (1.638 m) 10/11/2018 (Exact Date) 99%    Breastfeeding? BMI (Body Mass Index)                No 33.97 kg/m2           Blood Pressure from Last 3 Encounters:   11/07/18 126/80   05/21/18 122/72   09/11/17 116/80    Weight from Last 3 Encounters:   11/07/18 201 lb (91.2 kg)   05/21/18 191 lb 14.4 oz (87 kg)   09/11/17 188 lb 14.4 oz (85.7 kg)               Primary Care Provider Office Phone # Fax #    Richard Small [phone]       [address] [facility] [address]        Equal Access to Services     MACHO APARICIO : Bunny Wetzel, wacarmelina slaughter, qasky kaalmada renuka, kaitlynn al. So [facility] [phone].    ATENCIÓN: Si habla español, tiene a baptiste disposición servicios gratuitos de asistencia lingüística. Llame al [phone].    We comply with applicable federal civil rights laws and [place] laws. We do not discriminate on the basis of race, color, national origin, age, disability, sex, sexual orientation, or gender identity.            Thank you!     Thank you for choosing [facility]  for your care. Our goal is always to provide you with excellent care. Hearing back from our patients is one way we can continue to improve our services. Please take a few minutes to complete the written survey that you may receive in the mail after your visit with us. Thank you!             Your Updated Medication List - Protect others around you: Learn how to safely use, store and throw away your medicines at www.disposemymeds.org.          This list is accurate as of 11/7/18  8:33 AM.  Always use your most recent med list.                   Brand Name Dispense Instructions for use Diagnosis    ALPRAZolam 0.5 MG tablet    XANAX    30 tablet    Take 1 tablet (0.5 mg) by mouth as needed for anxiety 1-2 tabs as needed    Anxiety       amphetamine-dextroamphetamine 30 MG per 24 hr capsule    ADDERALL XR    30 capsule    Take 1 capsule (30 mg) by mouth daily     Attention deficit hyperactivity disorder (ADHD), combined type       ibuprofen 800 MG tablet    ADVIL/MOTRIN    90 tablet    Take 1 tablet (800 mg) by mouth every 8 hours as needed for pain    SLE (systemic lupus erythematosus) (H)       omeprazole 20 MG tablet     90 tablet    Take 1 tablet (20 mg) by mouth daily Take 30-60 minutes before a meal.    Gastroesophageal reflux disease without esophagitis       PLAQUENIL 200 MG tablet   Generic drug:  hydroxychloroquine     60 tablet    Take 2 tablets (400 mg) by mouth daily    Systemic lupus erythematosus, unspecified SLE type, unspecified organ involvement status (H)       triamcinolone 0.1 % cream    KENALOG    453.6 g    Apply sparingly to affected area two times daily as needed    Psoriasis       venlafaxine 75 MG 24 hr capsule    EFFEXOR-XR    270 capsule    TAKE THREE CAPSULES BY MOUTH EVERY DAY    Anxiety

## 2018-11-07 NOTE — PATIENT INSTRUCTIONS
You should have your cholesterol checked next year.  Be fasting for your physical then.      Schedule your mammogram       Preventive Health Recommendations  Female Ages 26 - 39  Yearly exam:   See your health care provider every year in order to    Review health changes.     Discuss preventive care.      Review your medicines if you your doctor has prescribed any.    Until age 30: Get a Pap test every three years (more often if you have had an abnormal result).    After age 30: Talk to your doctor about whether you should have a Pap test every 3 years or have a Pap test with HPV screening every 5 years.   You do not need a Pap test if your uterus was removed (hysterectomy) and you have not had cancer.  You should be tested each year for STDs (sexually transmitted diseases), if you're at risk.   Talk to your provider about how often to have your cholesterol checked.  If you are at risk for diabetes, you should have a diabetes test (fasting glucose).  Shots: Get a flu shot each year. Get a tetanus shot every 10 years.   Nutrition:     Eat at least 5 servings of fruits and vegetables each day.    Eat whole-grain bread, whole-wheat pasta and brown rice instead of white grains and rice.    Get adequate Calcium and Vitamin D.     Lifestyle    Exercise at least 150 minutes a week (30 minutes a day, 5 days of the week). This will help you control your weight and prevent disease.    Limit alcohol to one drink per day.    No smoking.     Wear sunscreen to prevent skin cancer.    See your dentist every six months for an exam and cleaning.

## 2018-11-08 NOTE — TELEPHONE ENCOUNTER
Rx for Xanax faxed to pharmacy - [facility]    Micheline Bhardwaj XRO/  [facility]

## 2018-11-19 ENCOUNTER — HOSPITAL ENCOUNTER (OUTPATIENT)
Dept: MAMMOGRAPHY | Facility: CLINIC | Age: 38
Discharge: HOME | End: 2018-11-19
Attending: NURSE PRACTITIONER | Admitting: NURSE PRACTITIONER
Payer: COMMERCIAL

## 2018-11-19 DIAGNOSIS — R92.30 DENSE BREAST TISSUE: ICD-10-CM

## 2018-11-19 DIAGNOSIS — Z12.31 VISIT FOR SCREENING MAMMOGRAM: ICD-10-CM

## 2018-11-19 DIAGNOSIS — Z80.3 FAMILY HISTORY OF BREAST CANCER: ICD-10-CM

## 2018-11-19 PROCEDURE — 77067 SCR MAMMO BI INCL CAD: CPT

## 2018-12-18 ENCOUNTER — MYC REFILL (OUTPATIENT)
Dept: INTERNAL MEDICINE | Facility: CLINIC | Age: 38
End: 2018-12-18

## 2018-12-18 DIAGNOSIS — F90.2 ATTENTION DEFICIT HYPERACTIVITY DISORDER (ADHD), COMBINED TYPE: ICD-10-CM

## 2018-12-18 RX ORDER — DEXTROAMPHETAMINE SACCHARATE, AMPHETAMINE ASPARTATE MONOHYDRATE, DEXTROAMPHETAMINE SULFATE AND AMPHETAMINE SULFATE 7.5; 7.5; 7.5; 7.5 MG/1; MG/1; MG/1; MG/1
30 CAPSULE, EXTENDED RELEASE ORAL DAILY
Qty: 30 CAPSULE | Refills: 0 | OUTPATIENT
Start: 2018-12-18

## 2018-12-18 NOTE — TELEPHONE ENCOUNTER
Adderall XR 30MG       Last Written Prescription Date:  11/06/2018  Last Fill Quantity: 30,   # refills: 0  Last Office Visit: 11/07/2018  Future Office visit:       Routing refill request to provider for review/approval because:  Drug not on the [facility], [facility] or [facility] refill protocol or controlled substance

## 2018-12-18 NOTE — TELEPHONE ENCOUNTER
I initially filled this prescription for Dr. Small in his absence once.  Since this time, it keeps coming back to me.  Please forward this prescription to Dr. Small for his review tomorrow.  As his patient is receiving Adderall, it probably should be from him with his knowledge.    Thank you very much.    Alberto

## 2018-12-19 RX ORDER — DEXTROAMPHETAMINE SACCHARATE, AMPHETAMINE ASPARTATE MONOHYDRATE, DEXTROAMPHETAMINE SULFATE AND AMPHETAMINE SULFATE 7.5; 7.5; 7.5; 7.5 MG/1; MG/1; MG/1; MG/1
30 CAPSULE, EXTENDED RELEASE ORAL DAILY
Qty: 30 CAPSULE | Refills: 0 | Status: SHIPPED | OUTPATIENT
Start: 2018-12-19 | End: 2019-01-26

## 2018-12-19 NOTE — TELEPHONE ENCOUNTER
Rx placed in secured drawer for patient to , patient notified.    Micheline Bhardwaj XRO/  [facility]

## 2019-01-26 ENCOUNTER — MYC REFILL (OUTPATIENT)
Dept: INTERNAL MEDICINE | Facility: CLINIC | Age: 39
End: 2019-01-26

## 2019-01-26 DIAGNOSIS — F90.2 ATTENTION DEFICIT HYPERACTIVITY DISORDER (ADHD), COMBINED TYPE: ICD-10-CM

## 2019-01-28 RX ORDER — DEXTROAMPHETAMINE SACCHARATE, AMPHETAMINE ASPARTATE MONOHYDRATE, DEXTROAMPHETAMINE SULFATE AND AMPHETAMINE SULFATE 7.5; 7.5; 7.5; 7.5 MG/1; MG/1; MG/1; MG/1
30 CAPSULE, EXTENDED RELEASE ORAL DAILY
Qty: 30 CAPSULE | Refills: 0 | Status: SHIPPED | OUTPATIENT
Start: 2019-01-28 | End: 2019-02-13

## 2019-01-28 NOTE — TELEPHONE ENCOUNTER
Rx placed in secured drawer for patient to .  Patient notified.    Micheline Bhardwaj XRO/  [facility]

## 2019-01-28 NOTE — TELEPHONE ENCOUNTER
Adderall      Last Written Prescription Date:  12/19/2018  Last Fill Quantity: 30,   # refills: 0  Last Office Visit: 11/07/2018  Future Office visit:    Next 5 appointments (look out 90 days)    Jan 30, 2019  8:20 AM VERNA Caldwell with ED Alstno CNP  [facility]) [address] [facility] [address]  [phone]   Feb 13, 2019  9:40 AM VERNA Caldwell with Richard Small MD  [facility]) [address] [facility] [address]  [phone]           Routing refill request to provider for review/approval because:  Drug not on the [facility], [facility] or [facility] refill protocol or controlled substance  Routed to pcp     Nely Montana MA

## 2019-01-30 ENCOUNTER — OFFICE VISIT (OUTPATIENT)
Dept: FAMILY MEDICINE | Facility: OTHER | Age: 39
End: 2019-01-30
Payer: COMMERCIAL

## 2019-01-30 VITALS
WEIGHT: 199 LBS | BODY MASS INDEX: 33.15 KG/M2 | OXYGEN SATURATION: 99 % | HEIGHT: 65 IN | RESPIRATION RATE: 20 BRPM | HEART RATE: 88 BPM | SYSTOLIC BLOOD PRESSURE: 120 MMHG | DIASTOLIC BLOOD PRESSURE: 72 MMHG | TEMPERATURE: 98.1 F

## 2019-01-30 DIAGNOSIS — F41.9 ANXIETY: ICD-10-CM

## 2019-01-30 PROCEDURE — 99213 OFFICE O/P EST LOW 20 MIN: CPT | Performed by: NURSE PRACTITIONER

## 2019-01-30 RX ORDER — ALPRAZOLAM 0.5 MG
0.5 TABLET ORAL PRN
Qty: 30 TABLET | Refills: 0 | Status: SHIPPED | OUTPATIENT
Start: 2019-01-30 | End: 2020-02-05

## 2019-01-30 ASSESSMENT — PATIENT HEALTH QUESTIONNAIRE - PHQ9: 5. POOR APPETITE OR OVEREATING: NEARLY EVERY DAY

## 2019-01-30 ASSESSMENT — ANXIETY QUESTIONNAIRES
6. BECOMING EASILY ANNOYED OR IRRITABLE: MORE THAN HALF THE DAYS
3. WORRYING TOO MUCH ABOUT DIFFERENT THINGS: NEARLY EVERY DAY
7. FEELING AFRAID AS IF SOMETHING AWFUL MIGHT HAPPEN: NEARLY EVERY DAY
IF YOU CHECKED OFF ANY PROBLEMS ON THIS QUESTIONNAIRE, HOW DIFFICULT HAVE THESE PROBLEMS MADE IT FOR YOU TO DO YOUR WORK, TAKE CARE OF THINGS AT HOME, OR GET ALONG WITH OTHER PEOPLE: VERY DIFFICULT
GAD7 TOTAL SCORE: 19
2. NOT BEING ABLE TO STOP OR CONTROL WORRYING: NEARLY EVERY DAY
1. FEELING NERVOUS, ANXIOUS, OR ON EDGE: NEARLY EVERY DAY
5. BEING SO RESTLESS THAT IT IS HARD TO SIT STILL: MORE THAN HALF THE DAYS

## 2019-01-30 ASSESSMENT — PAIN SCALES - GENERAL: PAINLEVEL: NO PAIN (0)

## 2019-01-30 ASSESSMENT — MIFFLIN-ST. JEOR: SCORE: 1583.77

## 2019-01-30 NOTE — PROGRESS NOTES
SUBJECTIVE:   Jimena Moreira is a 38 year old female who presents to clinic today for the following health issues:      Anxiety Follow-Up    Status since last visit: Worsened     Other associated symptoms: affecting sleep, emotions    Complicating factors:   Significant life event: Yes-  Issue with a coworker    Current substance abuse: None  Depression symptoms: No  JET-7 SCORE 2016   Total Score - - -   Total Score 4 6 3     She has been having an issue with a coworker.  She now has to meet with her manager about this.  She is not sure what all the coworker has accused her of or what is going to happen.  She has been very nervous and the meeting with her manager has been canceled twice recently, which is making her anxiety worse.    Filled her Xanax in November.  Was taking this sparingly, but now using it nearly daily.   Is also on Effexor.  This had been working well prior to this issue at work.         Problem list and histories reviewed & adjusted, as indicated.  Additional history: as documented    Patient Active Problem List   Diagnosis     CARDIOVASCULAR SCREENING; LDL GOAL LESS THAN 160     SLE (systemic lupus erythematosus) (H)     Sicca (H)     High risk medication use     Anxiety     Generalized anxiety disorder     Attention deficit hyperactivity disorder (ADHD)     Past Surgical History:   Procedure Laterality Date     BIOPSY       CHOLECYSTECTOMY         Social History     Tobacco Use     Smoking status: Former Smoker     Last attempt to quit: 2013     Years since quittin.6     Smokeless tobacco: Never Used     Tobacco comment: social   Substance Use Topics     Alcohol use: Yes     Comment: occ     Family History   Problem Relation Age of Onset     Hypertension Father      Breast Cancer Mother      Other Cancer Maternal Grandfather 87        Lymphoma     Diabetes Maternal Grandfather      Cancer Paternal Aunt      Cardiovascular Paternal Uncle         CHF,  in  "sleep     Hypertension Brother         hypertension, Etoh use     Hyperlipidemia Brother      Breast Cancer Paternal Grandmother      Colon Cancer No family hx of          Current Outpatient Medications   Medication Sig Dispense Refill     ALPRAZolam (XANAX) 0.5 MG tablet Take 1 tablet (0.5 mg) by mouth as needed for anxiety 1-2 tabs as needed 30 tablet 0     amphetamine-dextroamphetamine (ADDERALL XR) 30 MG 24 hr capsule Take 1 capsule (30 mg) by mouth daily 30 capsule 0     hydroxychloroquine (PLAQUENIL) 200 MG tablet Take 2 tablets (400 mg) by mouth daily 60 tablet 1     ibuprofen (ADVIL,MOTRIN) 800 MG tablet Take 1 tablet (800 mg) by mouth every 8 hours as needed for pain 90 tablet 1     omeprazole 20 MG tablet Take 1 tablet (20 mg) by mouth daily Take 30-60 minutes before a meal. 90 tablet 1     triamcinolone (KENALOG) 0.1 % cream Apply sparingly to affected area two times daily as needed 453.6 g 0     venlafaxine (EFFEXOR-XR) 75 MG 24 hr capsule TAKE THREE CAPSULES BY MOUTH EVERY  capsule 3     Allergies   Allergen Reactions     Nickel Chloride  [Nickel] Dermatitis     No Clinical Screening - See Comments Dermatitis     Zoloft [Sertraline] Anxiety     Cliches jaw, Increased anxiety     BP Readings from Last 3 Encounters:   01/30/19 120/72   11/07/18 126/80   05/21/18 122/72    Wt Readings from Last 3 Encounters:   01/30/19 90.3 kg (199 lb)   11/07/18 91.2 kg (201 lb)   05/21/18 87 kg (191 lb 14.4 oz)                    Reviewed and updated as needed this visit by clinical staff  Tobacco  Allergies  Meds       Reviewed and updated as needed this visit by Provider         ROS:  Constitutional, HEENT, cardiovascular, pulmonary, gi and gu systems are negative, except as otherwise noted.    OBJECTIVE:     /72   Pulse 88   Temp 98.1  F (36.7  C) (Temporal)   Resp 20   Ht 1.643 m (5' 4.7\")   Wt 90.3 kg (199 lb)   LMP 01/26/2019 (Exact Date)   SpO2 99%   Breastfeeding? No   BMI 33.42 kg/m  "   Body mass index is 33.42 kg/m .  GENERAL: healthy, alert and no distress  PSYCH: mentation appears normal, tearful, anxious, judgement and insight intact and appearance well groomed    Diagnostic Test Results:  none     ASSESSMENT/PLAN:         1. Anxiety  Discussed short term use of Xanax more frequently for acute symptoms.  She is aware this is not recommended for long tterm daily use.  She is supposed to be having a meeting with her manager at work within the next week and hopefully will get a resolution to this issue.  No change in Effexor dose.    - ALPRAZolam (XANAX) 0.5 MG tablet; Take 1 tablet (0.5 mg) by mouth as needed for anxiety 1-2 tabs as needed  Dispense: 30 tablet; Refill: 0    See Patient Instructions    ED Alston [facility]

## 2019-01-31 ASSESSMENT — ANXIETY QUESTIONNAIRES: GAD7 TOTAL SCORE: 19

## 2019-02-05 ENCOUNTER — MYC MEDICAL ADVICE (OUTPATIENT)
Dept: FAMILY MEDICINE | Facility: OTHER | Age: 39
End: 2019-02-05

## 2019-02-13 ENCOUNTER — OFFICE VISIT (OUTPATIENT)
Dept: FAMILY MEDICINE | Facility: OTHER | Age: 39
End: 2019-02-13
Payer: COMMERCIAL

## 2019-02-13 VITALS
WEIGHT: 200.06 LBS | TEMPERATURE: 97.8 F | HEART RATE: 62 BPM | SYSTOLIC BLOOD PRESSURE: 110 MMHG | BODY MASS INDEX: 33.6 KG/M2 | DIASTOLIC BLOOD PRESSURE: 70 MMHG | RESPIRATION RATE: 14 BRPM | OXYGEN SATURATION: 98 %

## 2019-02-13 DIAGNOSIS — M32.9 SYSTEMIC LUPUS ERYTHEMATOSUS, UNSPECIFIED SLE TYPE, UNSPECIFIED ORGAN INVOLVEMENT STATUS (H): ICD-10-CM

## 2019-02-13 DIAGNOSIS — F41.9 ANXIETY: Primary | ICD-10-CM

## 2019-02-13 DIAGNOSIS — F90.2 ATTENTION DEFICIT HYPERACTIVITY DISORDER (ADHD), COMBINED TYPE: ICD-10-CM

## 2019-02-13 PROCEDURE — 99214 OFFICE O/P EST MOD 30 MIN: CPT | Performed by: FAMILY MEDICINE

## 2019-02-13 RX ORDER — DEXTROAMPHETAMINE SACCHARATE, AMPHETAMINE ASPARTATE MONOHYDRATE, DEXTROAMPHETAMINE SULFATE AND AMPHETAMINE SULFATE 7.5; 7.5; 7.5; 7.5 MG/1; MG/1; MG/1; MG/1
30 CAPSULE, EXTENDED RELEASE ORAL DAILY
Qty: 30 CAPSULE | Refills: 0 | Status: SHIPPED | OUTPATIENT
Start: 2019-02-27 | End: 2019-03-27

## 2019-02-13 ASSESSMENT — PATIENT HEALTH QUESTIONNAIRE - PHQ9: 5. POOR APPETITE OR OVEREATING: SEVERAL DAYS

## 2019-02-13 ASSESSMENT — ANXIETY QUESTIONNAIRES
IF YOU CHECKED OFF ANY PROBLEMS ON THIS QUESTIONNAIRE, HOW DIFFICULT HAVE THESE PROBLEMS MADE IT FOR YOU TO DO YOUR WORK, TAKE CARE OF THINGS AT HOME, OR GET ALONG WITH OTHER PEOPLE: SOMEWHAT DIFFICULT
5. BEING SO RESTLESS THAT IT IS HARD TO SIT STILL: SEVERAL DAYS
1. FEELING NERVOUS, ANXIOUS, OR ON EDGE: SEVERAL DAYS
GAD7 TOTAL SCORE: 13
2. NOT BEING ABLE TO STOP OR CONTROL WORRYING: NEARLY EVERY DAY
6. BECOMING EASILY ANNOYED OR IRRITABLE: MORE THAN HALF THE DAYS
7. FEELING AFRAID AS IF SOMETHING AWFUL MIGHT HAPPEN: MORE THAN HALF THE DAYS
3. WORRYING TOO MUCH ABOUT DIFFERENT THINGS: NEARLY EVERY DAY

## 2019-02-13 ASSESSMENT — PAIN SCALES - GENERAL: PAINLEVEL: NO PAIN (0)

## 2019-02-13 NOTE — PROGRESS NOTES
SUBJECTIVE:   Jimena Moreira is a 38 year old female who presents to clinic today for the following health issues:        Anxiety Follow-Up    Status since last visit: Improved     Other associated symptoms:more anxious.     Complicating factors:   Significant life event: Yes-  work   Current substance abuse: None  Depression symptoms: No  JET-7 SCORE 6/26/2017 1/30/2019 2/13/2019   Total Score - - -   Total Score 3 19 13       JET-7    Amount of exercise or physical activity: walking    Problems taking medications regularly: No    Medication side effects: none    Diet: regular (no restrictions)      Medication Followup of Adderall    Taking Medication as prescribed: yes    Side Effects:  None    Medication Helping Symptoms:  yes       Concern: with weight gain.         Problem list and histories reviewed & adjusted, as indicated.  Additional history: Patient follows up today after being seen by Zofia Bucio 2 weeks ago for acute anxiety.  The episode of anxiety he had to do with work performance and interaction with her supervisor.  He states today that that issue is for the most part resolved.  Her anxiety has improved.  She needs to have issues with hypervigilance and hyperfocus on areas of conflict or accountability.  She has seen behavioral health in the distant past.  She is currently not seeing a therapist at this time.  He does have some interest in seeing a therapist for this issue.    She has been on Effexor 225 mg daily for several years.  She has gained 10 pounds in the last year and excess of 30 pounds in the last 5 years.  She continues on her Adderall for her ADHD.  She feels that this works well for her.  Continues to have problems with sleep primarily due to inability to fall asleep at night because of thoughts about incomplete tasks and conflicts during the day.    Patient Active Problem List   Diagnosis     CARDIOVASCULAR SCREENING; LDL GOAL LESS THAN 160     SLE (systemic lupus erythematosus)  (H)     Sicca (H)     High risk medication use     Anxiety     Generalized anxiety disorder     Attention deficit hyperactivity disorder (ADHD)     Past Surgical History:   Procedure Laterality Date     BIOPSY       CHOLECYSTECTOMY         Social History     Tobacco Use     Smoking status: Former Smoker     Last attempt to quit: 2013     Years since quittin.6     Smokeless tobacco: Never Used     Tobacco comment: social   Substance Use Topics     Alcohol use: Yes     Comment: occ     Family History   Problem Relation Age of Onset     Hypertension Father      Breast Cancer Mother      Other Cancer Maternal Grandfather 87        Lymphoma     Diabetes Maternal Grandfather      Cancer Paternal Aunt      Cardiovascular Paternal Uncle         CHF,  in sleep     Hypertension Brother         hypertension, Etoh use     Hyperlipidemia Brother      Breast Cancer Paternal Grandmother      Colon Cancer No family hx of          Current Outpatient Medications   Medication Sig Dispense Refill     ALPRAZolam (XANAX) 0.5 MG tablet Take 1 tablet (0.5 mg) by mouth as needed for anxiety 1-2 tabs as needed 30 tablet 0     amphetamine-dextroamphetamine (ADDERALL XR) 30 MG 24 hr capsule Take 1 capsule (30 mg) by mouth daily 30 capsule 0     hydroxychloroquine (PLAQUENIL) 200 MG tablet Take 2 tablets (400 mg) by mouth daily 60 tablet 1     omeprazole 20 MG tablet Take 1 tablet (20 mg) by mouth daily Take 30-60 minutes before a meal. 90 tablet 1     triamcinolone (KENALOG) 0.1 % cream Apply sparingly to affected area two times daily as needed 453.6 g 0     venlafaxine (EFFEXOR-XR) 75 MG 24 hr capsule TAKE THREE CAPSULES BY MOUTH EVERY  capsule 3     ibuprofen (ADVIL,MOTRIN) 800 MG tablet Take 1 tablet (800 mg) by mouth every 8 hours as needed for pain (Patient not taking: Reported on 2019) 90 tablet 1     Allergies   Allergen Reactions     Nickel Chloride  [Nickel] Dermatitis     No Clinical Screening - See Comments  Dermatitis     Zoloft [Sertraline] Anxiety     Cliches jaw, Increased anxiety     Recent Labs   Lab Test 08/08/16  0844 01/14/15  1450 09/10/14  1107 09/10/14  1106 01/22/14  1135  05/08/13  1050   LDL  --   --   --  63  --   --  69   HDL  --   --   --  80  --   --   --    TRIG  --   --   --  63  --   --   --    ALT 17 18 23  --  41   < >  --    CR 0.70 1.00 0.70  --  0.76   < >  --    GFRESTIMATED >90  Non  GFR Calc   64 >90  Non  GFR Calc    --  88   < >  --    GFRESTBLACK >90   GFR Calc   77 >90   GFR Calc    --  >90   < >  --    POTASSIUM 4.5 3.8 4.0  --  4.4   < >  --    TSH  --  0.76  --   --  1.59  --  1.39    < > = values in this interval not displayed.      BP Readings from Last 3 Encounters:   02/13/19 110/70   01/30/19 120/72   11/07/18 126/80    Wt Readings from Last 3 Encounters:   02/13/19 90.7 kg (200 lb 1 oz)   01/30/19 90.3 kg (199 lb)   11/07/18 91.2 kg (201 lb)                  Labs reviewed in EPIC    Reviewed and updated as needed this visit by clinical staff  Tobacco  Allergies  Meds  Med Hx  Surg Hx  Fam Hx  Soc Hx      Reviewed and updated as needed this visit by Provider         ROS:  Constitutional, HEENT, cardiovascular, pulmonary, gi and gu systems are negative, except as otherwise noted.    OBJECTIVE:     /70 (BP Location: Left arm, Patient Position: Chair, Cuff Size: Adult Large)   Pulse 62   Temp 97.8  F (36.6  C) (Temporal)   Resp 14   Wt 90.7 kg (200 lb 1 oz)   LMP 01/26/2019 (Exact Date)   SpO2 98%   BMI 33.60 kg/m    Body mass index is 33.6 kg/m .  GENERAL: healthy, alert and no distress  PSYCH: tangential, affect normal/bright, speech pressured, judgement and insight intact and appearance well groomed    Diagnostic Test Results:  none     ASSESSMENT/PLAN:     1. Anxiety  Chronic. She has been on Effexor for many years and recently using Xanax due to work conflicts. She certainly has symptoms of  possible OCD as she continues to have hyper focus on issues of conflict and accountability.  She is seeing a therapist in the past but is currently not seeing a therapist and would like to that referral placed.  - MENTAL HEALTH REFERRAL  - Adult; Outpatient Treatment; Individual/Couples/Family/Group Therapy/Health Psychology; [facility]: [facility] [phone]; We will contact you to schedule the appointment or please call with any questions    2. Attention deficit hyperactivity disorder (ADHD), combined type  Chronic long standing. The current medical regimen is effective;  continue present plan and medications.   - amphetamine-dextroamphetamine (ADDERALL XR) 30 MG 24 hr capsule; Take 1 capsule (30 mg) by mouth daily  Dispense: 30 capsule; Refill: 0    3. Systemic lupus erythematosus, unspecified SLE type, unspecified organ involvement status (H)  She is followed by Rheumatology. The current medical regimen is effective;  continue present plan and medications.       FUTURE APPOINTMENTS:       - Follow-up visit in 6 months.    Richard Small MD  [facility]

## 2019-02-14 ASSESSMENT — ANXIETY QUESTIONNAIRES: GAD7 TOTAL SCORE: 13

## 2019-03-27 ENCOUNTER — MYC REFILL (OUTPATIENT)
Dept: FAMILY MEDICINE | Facility: OTHER | Age: 39
End: 2019-03-27

## 2019-03-27 DIAGNOSIS — F90.2 ATTENTION DEFICIT HYPERACTIVITY DISORDER (ADHD), COMBINED TYPE: ICD-10-CM

## 2019-03-27 RX ORDER — DEXTROAMPHETAMINE SACCHARATE, AMPHETAMINE ASPARTATE MONOHYDRATE, DEXTROAMPHETAMINE SULFATE AND AMPHETAMINE SULFATE 7.5; 7.5; 7.5; 7.5 MG/1; MG/1; MG/1; MG/1
30 CAPSULE, EXTENDED RELEASE ORAL DAILY
Qty: 30 CAPSULE | Refills: 0 | Status: SHIPPED | OUTPATIENT
Start: 2019-03-29 | End: 2019-05-16

## 2019-03-27 NOTE — TELEPHONE ENCOUNTER
Adderall XR       Last Written Prescription Date:  02/27/2019  Last Fill Quantity: 30,   # refills: 0  Last Office Visit: 02/13/2019  Future Office visit:       Routing refill request to provider for review/approval because:  Drug not on the [facility], P or [facility] refill protocol or controlled substance    Routed to pcp.     Nely Montana MA

## 2019-03-27 NOTE — TELEPHONE ENCOUNTER
Rx placed in secured drawer for patient to  at  per patient request - [facility]    Micheline Bhardwaj XRO/

## 2019-04-02 ENCOUNTER — TELEPHONE (OUTPATIENT)
Dept: FAMILY MEDICINE | Facility: OTHER | Age: 39
End: 2019-04-02

## 2019-04-02 NOTE — TELEPHONE ENCOUNTER
Prior Authorization Retail Medication Request    Medication/Dose: amphetamine-dextroamphetamine (ADDERALL XR) 30 MG 24 hr capsule  ICD code (if different than what is on RX):  Attention deficit hyperactivity disorder (ADHD), combined type [F90.2]   Previously Tried and Failed:  na  Rationale:  na    Insurance Name:  [employer]  Insurance ID:  [id number]      Pharmacy Information (if different than what is on RX)  Name:  [facility]  Phone:  [phone]

## 2019-04-06 NOTE — TELEPHONE ENCOUNTER
Central Prior Authorization Team   Phone: [phone]    PA Initiation    Medication: amphetamine-dextroamphetamine (ADDERALL XR) 30 MG 24 hr capsule  Insurance Company: [employer] - Phone [phone] Fax [phone]  Pharmacy Filling the Rx: [facility] [address] [place] [address]  Filling Pharmacy Phone: [phone]  Filling Pharmacy Fax: [phone]  Start Date: 4/6/2019

## 2019-04-06 NOTE — TELEPHONE ENCOUNTER
Prior Authorization Approval    Authorization Effective Date: 4/6/2019  Authorization Expiration Date: 4/5/2020  Medication: amphetamine-dextroamphetamine (ADDERALL XR) 30 MG-APPROVED  Approved Dose/Quantity:    Reference #: [id number]   Insurance Company: [employer] - Phone [phone] Fax [phone]  Expected CoPay:       CoPay Card Available:      Foundation Assistance Needed:    Which Pharmacy is filling the prescription (Not needed for infusion/clinic administered): [facility] [address] [facility]  Pharmacy Notified: Yes  Patient Notified: Yes

## 2019-04-18 DIAGNOSIS — K21.9 GASTROESOPHAGEAL REFLUX DISEASE WITHOUT ESOPHAGITIS: ICD-10-CM

## 2019-04-19 NOTE — TELEPHONE ENCOUNTER
"Requested Prescriptions   Pending Prescriptions Disp Refills     omeprazole (PRILOSEC) 20 MG DR capsule [Pharmacy Med Name: omeprazole (PRILOSEC) 20 mg oral delayed release capsule] 90 capsule 1     Sig: Take 1 capsule (20 mg) by mouth once daily. Take 30-60 minutes before a meal   Last Written Prescription Date:  9/27/18  Last Fill Quantity: 90,  # refills: 1   Last office visit: 2/13/2019 with prescribing provider:  2/13/19   Future Office Visit:        PPI Protocol Passed - 4/18/2019  1:30 PM        Passed - Not on Clopidogrel (unless Pantoprazole ordered)        Passed - No diagnosis of osteoporosis on record        Passed - Recent (12 mo) or future (30 days) visit within the authorizing provider's specialty     Patient had office visit in the last 12 months or has a visit in the next 30 days with authorizing provider or within the authorizing provider's specialty.  See \"Patient Info\" tab in inbasket, or \"Choose Columns\" in Meds & Orders section of the refill encounter.              Passed - Medication is active on med list        Passed - Patient is age 18 or older        Passed - No active pregnacy on record        Passed - No positive pregnancy test in past 12 months        "

## 2019-04-19 NOTE — TELEPHONE ENCOUNTER
Prescription approved per [facility] Refill Protocol.    STACEY TorresN, RN  [facility]

## 2019-05-16 ENCOUNTER — MYC REFILL (OUTPATIENT)
Dept: FAMILY MEDICINE | Facility: OTHER | Age: 39
End: 2019-05-16

## 2019-05-16 DIAGNOSIS — F90.2 ATTENTION DEFICIT HYPERACTIVITY DISORDER (ADHD), COMBINED TYPE: ICD-10-CM

## 2019-05-16 RX ORDER — DEXTROAMPHETAMINE SACCHARATE, AMPHETAMINE ASPARTATE MONOHYDRATE, DEXTROAMPHETAMINE SULFATE AND AMPHETAMINE SULFATE 7.5; 7.5; 7.5; 7.5 MG/1; MG/1; MG/1; MG/1
30 CAPSULE, EXTENDED RELEASE ORAL DAILY
Qty: 30 CAPSULE | Refills: 0 | Status: SHIPPED | OUTPATIENT
Start: 2019-05-16 | End: 2019-07-03

## 2019-05-16 NOTE — TELEPHONE ENCOUNTER
Requested Prescriptions   Pending Prescriptions Disp Refills     amphetamine-dextroamphetamine (ADDERALL XR) 30 MG 24 hr capsule 30 capsule 0     Sig: Take 1 capsule (30 mg) by mouth daily       There is no refill protocol information for this order

## 2019-07-03 ENCOUNTER — MYC REFILL (OUTPATIENT)
Dept: FAMILY MEDICINE | Facility: OTHER | Age: 39
End: 2019-07-03

## 2019-07-03 DIAGNOSIS — F90.2 ATTENTION DEFICIT HYPERACTIVITY DISORDER (ADHD), COMBINED TYPE: ICD-10-CM

## 2019-07-05 RX ORDER — DEXTROAMPHETAMINE SACCHARATE, AMPHETAMINE ASPARTATE MONOHYDRATE, DEXTROAMPHETAMINE SULFATE AND AMPHETAMINE SULFATE 7.5; 7.5; 7.5; 7.5 MG/1; MG/1; MG/1; MG/1
30 CAPSULE, EXTENDED RELEASE ORAL DAILY
Qty: 30 CAPSULE | Refills: 0 | Status: SHIPPED | OUTPATIENT
Start: 2019-07-05 | End: 2019-08-13

## 2019-07-05 NOTE — TELEPHONE ENCOUNTER
amphetamine-dextroamphetamine (ADDERALL XR) 30 MG 24 hr capsule      Last Written Prescription Date:  5/16/19  Last Fill Quantity: 30,   # refills: 0  Last Office Visit: 2/13/19  Future Office visit:       Routing refill request to provider for review/approval because:  Drug not on the [facility], P or [facility] refill protocol or controlled substance

## 2019-07-05 NOTE — TELEPHONE ENCOUNTER
Left message at # to call back. Please find out if she would like this mailed or if she wants to pick it up.   Gaby CALHOUN

## 2019-07-08 ENCOUNTER — MYC MEDICAL ADVICE (OUTPATIENT)
Dept: FAMILY MEDICINE | Facility: OTHER | Age: 39
End: 2019-07-08

## 2019-07-08 NOTE — TELEPHONE ENCOUNTER
2nd attempt LM to call clinic. Please give message below. Also sent mychart message to patient as well.     Nely Montana MA

## 2019-07-08 NOTE — TELEPHONE ENCOUNTER
Rx mailed to patient per instructions in original refill message, MyChart message also sent to patient alerting her that this was done.    Micheline Bhardwaj XRO/

## 2019-07-08 NOTE — TELEPHONE ENCOUNTER
I am mailing this Rx to your home address as you requested, previous staff did not see this.  It will go out in the mail tomorrow morning, so if that is to late for you, let me know and I will hold it for you to .  I would need to know before 10am tomorrow.    Micheline Bhardwaj XRO/  [phone]

## 2019-08-13 ENCOUNTER — MYC REFILL (OUTPATIENT)
Dept: FAMILY MEDICINE | Facility: OTHER | Age: 39
End: 2019-08-13

## 2019-08-13 DIAGNOSIS — F90.2 ATTENTION DEFICIT HYPERACTIVITY DISORDER (ADHD), COMBINED TYPE: ICD-10-CM

## 2019-08-14 RX ORDER — DEXTROAMPHETAMINE SACCHARATE, AMPHETAMINE ASPARTATE MONOHYDRATE, DEXTROAMPHETAMINE SULFATE AND AMPHETAMINE SULFATE 7.5; 7.5; 7.5; 7.5 MG/1; MG/1; MG/1; MG/1
30 CAPSULE, EXTENDED RELEASE ORAL DAILY
Qty: 30 CAPSULE | Refills: 0 | Status: SHIPPED | OUTPATIENT
Start: 2019-08-14 | End: 2019-09-23

## 2019-08-14 NOTE — TELEPHONE ENCOUNTER
Adderall       Last Written Prescription Date:  07/05/2019  Last Fill Quantity: 30,   # refills: 0  Last Office Visit: 02/13/2019  Future Office visit:    Next 5 appointments (look out 90 days)    Sep 23, 2019 10:40 AM CDT  Branden Melo with [facility] Small [facility] [address] [facility] [address]  [phone]           Routing refill request to provider for review/approval because:  Drug not on the [facility], [facility] or [facility] refill protocol or controlled substance    Routing to covering provider to sign in absence of Dr. Jones. Out of office until 8/26/2019.     Nely Montana MA

## 2019-09-23 ENCOUNTER — OFFICE VISIT (OUTPATIENT)
Dept: FAMILY MEDICINE | Facility: OTHER | Age: 39
End: 2019-09-23
Payer: COMMERCIAL

## 2019-09-23 VITALS
OXYGEN SATURATION: 98 % | SYSTOLIC BLOOD PRESSURE: 114 MMHG | DIASTOLIC BLOOD PRESSURE: 84 MMHG | HEART RATE: 92 BPM | WEIGHT: 211.06 LBS | TEMPERATURE: 98 F | RESPIRATION RATE: 14 BRPM | BODY MASS INDEX: 35.45 KG/M2

## 2019-09-23 DIAGNOSIS — L40.9 PSORIASIS: ICD-10-CM

## 2019-09-23 DIAGNOSIS — F41.9 ANXIETY: Primary | ICD-10-CM

## 2019-09-23 DIAGNOSIS — F90.2 ATTENTION DEFICIT HYPERACTIVITY DISORDER (ADHD), COMBINED TYPE: ICD-10-CM

## 2019-09-23 DIAGNOSIS — E66.812 CLASS 2 OBESITY WITHOUT SERIOUS COMORBIDITY WITH BODY MASS INDEX (BMI) OF 35.0 TO 35.9 IN ADULT, UNSPECIFIED OBESITY TYPE: ICD-10-CM

## 2019-09-23 DIAGNOSIS — F33.1 MAJOR DEPRESSIVE DISORDER, RECURRENT EPISODE, MODERATE (H): ICD-10-CM

## 2019-09-23 LAB
ALBUMIN SERPL-MCNC: 3.8 G/DL (ref 3.4–5)
ALP SERPL-CCNC: 90 U/L (ref 40–150)
ALT SERPL W P-5'-P-CCNC: 34 U/L (ref 0–50)
ANION GAP SERPL CALCULATED.3IONS-SCNC: 8 MMOL/L (ref 3–14)
AST SERPL W P-5'-P-CCNC: 24 U/L (ref 0–45)
BILIRUB SERPL-MCNC: 0.3 MG/DL (ref 0.2–1.3)
BUN SERPL-MCNC: 9 MG/DL (ref 7–30)
CALCIUM SERPL-MCNC: 8.9 MG/DL (ref 8.5–10.1)
CHLORIDE SERPL-SCNC: 104 MMOL/L (ref 94–109)
CO2 SERPL-SCNC: 26 MMOL/L (ref 20–32)
CREAT SERPL-MCNC: 0.74 MG/DL (ref 0.52–1.04)
ERYTHROCYTE [DISTWIDTH] IN BLOOD BY AUTOMATED COUNT: 13.9 % (ref 10–15)
GFR SERPL CREATININE-BSD FRML MDRD: >90 ML/MIN/{1.73_M2}
GLUCOSE SERPL-MCNC: 114 MG/DL (ref 70–99)
HCT VFR BLD AUTO: 42.8 % (ref 35–47)
HGB BLD-MCNC: 14.2 G/DL (ref 11.7–15.7)
MCH RBC QN AUTO: 29.7 PG (ref 26.5–33)
MCHC RBC AUTO-ENTMCNC: 33.2 G/DL (ref 31.5–36.5)
MCV RBC AUTO: 90 FL (ref 78–100)
PLATELET # BLD AUTO: 408 10E9/L (ref 150–450)
POTASSIUM SERPL-SCNC: 4 MMOL/L (ref 3.4–5.3)
PROT SERPL-MCNC: 8.2 G/DL (ref 6.8–8.8)
RBC # BLD AUTO: 4.78 10E12/L (ref 3.8–5.2)
SODIUM SERPL-SCNC: 138 MMOL/L (ref 133–144)
TSH SERPL DL<=0.005 MIU/L-ACNC: 2.66 MU/L (ref 0.4–4)
WBC # BLD AUTO: 7.7 10E9/L (ref 4–11)

## 2019-09-23 PROCEDURE — 80053 COMPREHEN METABOLIC PANEL: CPT | Performed by: FAMILY MEDICINE

## 2019-09-23 PROCEDURE — 85027 COMPLETE CBC AUTOMATED: CPT | Performed by: FAMILY MEDICINE

## 2019-09-23 PROCEDURE — 36415 COLL VENOUS BLD VENIPUNCTURE: CPT | Performed by: FAMILY MEDICINE

## 2019-09-23 PROCEDURE — 99214 OFFICE O/P EST MOD 30 MIN: CPT | Performed by: FAMILY MEDICINE

## 2019-09-23 PROCEDURE — 84443 ASSAY THYROID STIM HORMONE: CPT | Performed by: FAMILY MEDICINE

## 2019-09-23 RX ORDER — DEXTROAMPHETAMINE SACCHARATE, AMPHETAMINE ASPARTATE MONOHYDRATE, DEXTROAMPHETAMINE SULFATE AND AMPHETAMINE SULFATE 7.5; 7.5; 7.5; 7.5 MG/1; MG/1; MG/1; MG/1
30 CAPSULE, EXTENDED RELEASE ORAL DAILY
Qty: 30 CAPSULE | Refills: 0 | Status: SHIPPED | OUTPATIENT
Start: 2019-09-23 | End: 2020-02-05

## 2019-09-23 RX ORDER — TRIAMCINOLONE ACETONIDE 1 MG/G
CREAM TOPICAL
Qty: 453.6 G | Refills: 0 | Status: SHIPPED | OUTPATIENT
Start: 2019-09-23

## 2019-09-23 ASSESSMENT — ANXIETY QUESTIONNAIRES
1. FEELING NERVOUS, ANXIOUS, OR ON EDGE: SEVERAL DAYS
3. WORRYING TOO MUCH ABOUT DIFFERENT THINGS: MORE THAN HALF THE DAYS
IF YOU CHECKED OFF ANY PROBLEMS ON THIS QUESTIONNAIRE, HOW DIFFICULT HAVE THESE PROBLEMS MADE IT FOR YOU TO DO YOUR WORK, TAKE CARE OF THINGS AT HOME, OR GET ALONG WITH OTHER PEOPLE: SOMEWHAT DIFFICULT
2. NOT BEING ABLE TO STOP OR CONTROL WORRYING: MORE THAN HALF THE DAYS
5. BEING SO RESTLESS THAT IT IS HARD TO SIT STILL: NOT AT ALL
GAD7 TOTAL SCORE: 9
7. FEELING AFRAID AS IF SOMETHING AWFUL MIGHT HAPPEN: SEVERAL DAYS
6. BECOMING EASILY ANNOYED OR IRRITABLE: MORE THAN HALF THE DAYS

## 2019-09-23 ASSESSMENT — PATIENT HEALTH QUESTIONNAIRE - PHQ9
SUM OF ALL RESPONSES TO PHQ QUESTIONS 1-9: 9
5. POOR APPETITE OR OVEREATING: SEVERAL DAYS

## 2019-09-23 ASSESSMENT — PAIN SCALES - GENERAL: PAINLEVEL: NO PAIN (0)

## 2019-09-23 NOTE — PROGRESS NOTES
Subjective     Jimena Moreira is a 39 year old female who presents to clinic today for the following health issues:    HPI   Anxiety Follow-Up    How are you doing with your anxiety since your last visit? Improved little but feels worse     Are you having other symptoms that might be associated with anxiety? Yes:  more irritable     Have you had a significant life event? No     Are you feeling depressed? No    Do you have any concerns with your use of alcohol or other drugs? No    Social History     Tobacco Use     Smoking status: Former Smoker     Last attempt to quit: 2013     Years since quittin.2     Smokeless tobacco: Never Used     Tobacco comment: social   Substance Use Topics     Alcohol use: Yes     Comment: occ     Drug use: No     JET-7 SCORE 2019   Total Score - - -   Total Score 19 13 9     PHQ 2017   PHQ-9 Total Score 3 5 9   Q9: Thoughts of better off dead/self-harm past 2 weeks Not at all Not at all Not at all             How many servings of fruits and vegetables do you eat daily?  2-3    On average, how many sweetened beverages do you drink each day (soda, juice, sweet tea, etc)?   0  How many days per week do you miss taking your medication? 1    What makes it hard for you to take your medications?  remembering to take      Patient continues to struggle with ambition, fatigue, weight gain.  She admits that her exercise is limited to walking her dog once daily. She enjoys sleeping.  She has gained 35 pounds in the last 2 years.  She last had thyroid function checked in  and these were normal.  She has not established therapeutic relationship with a counselor yet as we previously discussed.  [facility] did not accept her insurance.  We discussed options including [facility] in [place].  She would like to meet with a dietitian to discuss healthy eating options.  We discussed at length minimum exercise of 150  minutes/week of moderate intensity activity.    Patient Active Problem List   Diagnosis     CARDIOVASCULAR SCREENING; LDL GOAL LESS THAN 160     SLE (systemic lupus erythematosus) (H)     Sicca (H)     High risk medication use     Anxiety     Generalized anxiety disorder     Attention deficit hyperactivity disorder (ADHD)     Past Surgical History:   Procedure Laterality Date     BIOPSY       CHOLECYSTECTOMY         Social History     Tobacco Use     Smoking status: Former Smoker     Last attempt to quit: 2013     Years since quittin.2     Smokeless tobacco: Never Used     Tobacco comment: social   Substance Use Topics     Alcohol use: Yes     Comment: occ     Family History   Problem Relation Age of Onset     Hypertension Father      Breast Cancer Mother      Other Cancer Maternal Grandfather 87        Lymphoma     Diabetes Maternal Grandfather      Cancer Paternal Aunt      Cardiovascular Paternal Uncle         CHF,  in sleep     Hypertension Brother         hypertension, Etoh use     Hyperlipidemia Brother      Breast Cancer Paternal Grandmother      Colon Cancer No family hx of          Current Outpatient Medications   Medication Sig Dispense Refill     amphetamine-dextroamphetamine (ADDERALL XR) 30 MG 24 hr capsule Take 1 capsule (30 mg) by mouth daily 30 capsule 0     hydroxychloroquine (PLAQUENIL) 200 MG tablet Take 2 tablets (400 mg) by mouth daily 60 tablet 1     ibuprofen (ADVIL,MOTRIN) 800 MG tablet Take 1 tablet (800 mg) by mouth every 8 hours as needed for pain 90 tablet 1     omeprazole (PRILOSEC) 20 MG DR capsule Take 1 capsule (20 mg) by mouth once daily. Take 30-60 minutes before a meal 90 capsule 1     triamcinolone (KENALOG) 0.1 % cream Apply sparingly to affected area two times daily as needed 453.6 g 0     venlafaxine (EFFEXOR-XR) 75 MG 24 hr capsule Take 3 capsules (225 mg) by mouth once daily. 270 capsule 3     ALPRAZolam (XANAX) 0.5 MG tablet Take 1 tablet (0.5 mg) by mouth as  needed for anxiety 1-2 tabs as needed (Patient not taking: Reported on 9/23/2019) 30 tablet 0     Allergies   Allergen Reactions     Nickel Chloride  [Nickel] Dermatitis     No Clinical Screening - See Comments Dermatitis     Zoloft [Sertraline] Anxiety     Cliches jaw, Increased anxiety     Recent Labs   Lab Test 08/08/16  0844 01/14/15  1450 09/10/14  1107 09/10/14  1106 01/22/14  1135  05/08/13  1050   LDL  --   --   --  63  --   --  69   HDL  --   --   --  80  --   --   --    TRIG  --   --   --  63  --   --   --    ALT 17 18 23  --  41   < >  --    CR 0.70 1.00 0.70  --  0.76   < >  --    GFRESTIMATED >90  Non  GFR Calc   64 >90  Non  GFR Calc    --  88   < >  --    GFRESTBLACK >90   GFR Calc   77 >90   GFR Calc    --  >90   < >  --    POTASSIUM 4.5 3.8 4.0  --  4.4   < >  --    TSH  --  0.76  --   --  1.59  --  1.39    < > = values in this interval not displayed.      BP Readings from Last 3 Encounters:   09/23/19 114/84   02/13/19 110/70   01/30/19 120/72    Wt Readings from Last 3 Encounters:   09/23/19 95.7 kg (211 lb 1 oz)   02/13/19 90.7 kg (200 lb 1 oz)   01/30/19 90.3 kg (199 lb)                      Reviewed and updated as needed this visit by Provider  Tobacco  Allergies  Meds  Problems  Med Hx  Surg Hx  Fam Hx         Review of Systems   ROS COMP: CONSTITUTIONAL:POSITIVE  for fatigue and weight gain and NEGATIVE  for anorexia, arthralgias, malaise, myalgias and sweats  ENT/MOUTH: NEGATIVE for ear, mouth and throat problems  RESP: NEGATIVE for significant cough or SOB  CV: NEGATIVE for chest pain, palpitations or peripheral edema  PSYCHIATRIC: POSITIVE foranhedonia, anxiety, Hx anxiety, Hx depression, feelings of worthlessness/guilt and weight gain and NEGATIVE foragitation, alcohol abuse, hallucinations, obsessive thoughts, thoughts of hurting someone else and thoughts of self harm  ROS otherwise negative      Objective    BP  114/84 (BP Location: Left arm, Patient Position: Chair, Cuff Size: Adult Large)   Pulse 92   Temp 98  F (36.7  C) (Temporal)   Resp 14   Wt 95.7 kg (211 lb 1 oz)   LMP 09/07/2019   SpO2 98%   BMI 35.45 kg/m    Body mass index is 35.45 kg/m .  Physical Exam   GENERAL: alert, no distress and obese  NECK: no adenopathy, no asymmetry, masses, or scars and thyroid normal to palpation  RESP: lungs clear to auscultation - no rales, rhonchi or wheezes  CV: regular rate and rhythm, normal S1 S2, no S3 or S4, no murmur, click or rub, no peripheral edema and peripheral pulses strong  PSYCH: tangential, affect normal/bright, tearful, anxious, judgement and insight intact and appearance well groomed    Diagnostic Test Results:  Labs reviewed in Epic  Results for orders placed or performed in visit on 09/23/19 (from the past 24 hour(s))   CBC with platelets   Result Value Ref Range    WBC 7.7 4.0 - 11.0 10e9/L    RBC Count 4.78 3.8 - 5.2 10e12/L    Hemoglobin 14.2 11.7 - 15.7 g/dL    Hematocrit 42.8 35.0 - 47.0 %    MCV 90 78 - 100 fl    MCH 29.7 26.5 - 33.0 pg    MCHC 33.2 31.5 - 36.5 g/dL    RDW 13.9 10.0 - 15.0 %    Platelet Count 408 150 - 450 10e9/L           Assessment & Plan     1. Attention deficit hyperactivity disorder (ADHD), combined type  Chronic. Stable function with Adderall. The current medical regimen is effective;  continue present plan and medications.   - amphetamine-dextroamphetamine (ADDERALL XR) 30 MG 24 hr capsule; Take 1 capsule (30 mg) by mouth daily  Dispense: 30 capsule; Refill: 0    2. Psoriasis  Chronic stable. The current medical regimen is effective;  continue present plan and medications.   - triamcinolone (KENALOG) 0.1 % external cream; Apply sparingly to affected area two times daily as needed  Dispense: 453.6 g; Refill: 0    3. Anxiety  Chronic, improved but increased anxiety due to weight gain issues. She has not established a therapeutic counselor yet. Encouraged her to consider  [facility] .    4. Major depressive disorder, recurrent episode, moderate (H)  Chronic. Very little ambition, limited exercise, weight gain. Will check TFT, CMP and CBC. She has not established a therapeutic counselor yet. Encouraged her to consider [facility] .    - TSH with free T4 reflex    5. Class 2 obesity without serious comorbidity with body mass index (BMI) of 35.0 to 35.9 in adult, unspecified obesity type  Chronic. Very little ambition, limited exercise, weight gain. Will check TFT, CMP and CBC. Will refer to Nutrition for dietary consultation. Consider referral to Weight Loss Clinic.  - NUTRITION REFERRAL  - TSH with free T4 reflex  - Comprehensive metabolic panel  - CBC with platelets       Work on weight loss  Regular exercise    Return in about 1 month (around 10/23/2019) for recheck weight loss.    Richard Small MD  [facility]

## 2019-09-24 ASSESSMENT — ANXIETY QUESTIONNAIRES: GAD7 TOTAL SCORE: 9

## 2019-11-04 ENCOUNTER — OFFICE VISIT (OUTPATIENT)
Dept: FAMILY MEDICINE | Facility: OTHER | Age: 39
End: 2019-11-04
Payer: COMMERCIAL

## 2019-11-04 VITALS
WEIGHT: 210.56 LBS | SYSTOLIC BLOOD PRESSURE: 124 MMHG | TEMPERATURE: 97.5 F | RESPIRATION RATE: 18 BRPM | DIASTOLIC BLOOD PRESSURE: 88 MMHG | BODY MASS INDEX: 35.37 KG/M2 | OXYGEN SATURATION: 100 % | HEART RATE: 104 BPM

## 2019-11-04 DIAGNOSIS — F90.2 ATTENTION DEFICIT HYPERACTIVITY DISORDER (ADHD), COMBINED TYPE: ICD-10-CM

## 2019-11-04 PROCEDURE — 99213 OFFICE O/P EST LOW 20 MIN: CPT | Performed by: FAMILY MEDICINE

## 2019-11-04 RX ORDER — DEXTROAMPHETAMINE SACCHARATE, AMPHETAMINE ASPARTATE MONOHYDRATE, DEXTROAMPHETAMINE SULFATE AND AMPHETAMINE SULFATE 7.5; 7.5; 7.5; 7.5 MG/1; MG/1; MG/1; MG/1
30 CAPSULE, EXTENDED RELEASE ORAL DAILY
Qty: 30 CAPSULE | Refills: 0 | Status: SHIPPED | OUTPATIENT
Start: 2020-01-05 | End: 2020-05-12

## 2019-11-04 RX ORDER — DEXTROAMPHETAMINE SACCHARATE, AMPHETAMINE ASPARTATE MONOHYDRATE, DEXTROAMPHETAMINE SULFATE AND AMPHETAMINE SULFATE 7.5; 7.5; 7.5; 7.5 MG/1; MG/1; MG/1; MG/1
30 CAPSULE, EXTENDED RELEASE ORAL DAILY
Qty: 30 CAPSULE | Refills: 0 | Status: CANCELLED | OUTPATIENT
Start: 2019-11-04

## 2019-11-04 RX ORDER — DEXTROAMPHETAMINE SACCHARATE, AMPHETAMINE ASPARTATE MONOHYDRATE, DEXTROAMPHETAMINE SULFATE AND AMPHETAMINE SULFATE 7.5; 7.5; 7.5; 7.5 MG/1; MG/1; MG/1; MG/1
30 CAPSULE, EXTENDED RELEASE ORAL DAILY
Qty: 30 CAPSULE | Refills: 0 | Status: SHIPPED | OUTPATIENT
Start: 2019-12-05 | End: 2020-02-05

## 2019-11-04 RX ORDER — DEXTROAMPHETAMINE SACCHARATE, AMPHETAMINE ASPARTATE MONOHYDRATE, DEXTROAMPHETAMINE SULFATE AND AMPHETAMINE SULFATE 7.5; 7.5; 7.5; 7.5 MG/1; MG/1; MG/1; MG/1
30 CAPSULE, EXTENDED RELEASE ORAL DAILY
Qty: 30 CAPSULE | Refills: 0 | Status: SHIPPED | OUTPATIENT
Start: 2019-11-04 | End: 2020-02-05

## 2019-11-04 ASSESSMENT — ANXIETY QUESTIONNAIRES
7. FEELING AFRAID AS IF SOMETHING AWFUL MIGHT HAPPEN: SEVERAL DAYS
2. NOT BEING ABLE TO STOP OR CONTROL WORRYING: MORE THAN HALF THE DAYS
3. WORRYING TOO MUCH ABOUT DIFFERENT THINGS: MORE THAN HALF THE DAYS
5. BEING SO RESTLESS THAT IT IS HARD TO SIT STILL: NOT AT ALL
6. BECOMING EASILY ANNOYED OR IRRITABLE: MORE THAN HALF THE DAYS
1. FEELING NERVOUS, ANXIOUS, OR ON EDGE: MORE THAN HALF THE DAYS
IF YOU CHECKED OFF ANY PROBLEMS ON THIS QUESTIONNAIRE, HOW DIFFICULT HAVE THESE PROBLEMS MADE IT FOR YOU TO DO YOUR WORK, TAKE CARE OF THINGS AT HOME, OR GET ALONG WITH OTHER PEOPLE: SOMEWHAT DIFFICULT
GAD7 TOTAL SCORE: 9

## 2019-11-04 ASSESSMENT — PAIN SCALES - GENERAL: PAINLEVEL: NO PAIN (0)

## 2019-11-04 ASSESSMENT — PATIENT HEALTH QUESTIONNAIRE - PHQ9
SUM OF ALL RESPONSES TO PHQ QUESTIONS 1-9: 7
5. POOR APPETITE OR OVEREATING: NOT AT ALL

## 2019-11-04 NOTE — PROGRESS NOTES
Subjective     Jimena Moreira is a 39 year old female who presents to clinic today for the following health issues:    HPI     Discuss weight loss    Depression and Anxiety Follow-Up    How are you doing with your depression since your last visit? No change    How are you doing with your anxiety since your last visit?  No change    Are you having other symptoms that might be associated with depression or anxiety? Yes:  tired     Have you had a significant life event? Job Concerns     Do you have any concerns with your use of alcohol or other drugs? No    Social History     Tobacco Use     Smoking status: Former Smoker     Last attempt to quit: 2013     Years since quittin.3     Smokeless tobacco: Never Used     Tobacco comment: social   Substance Use Topics     Alcohol use: Yes     Comment: occ     Drug use: No     PHQ 2018   PHQ-9 Total Score 5 9 7   Q9: Thoughts of better off dead/self-harm past 2 weeks Not at all Not at all Not at all     JET-7 SCORE 2019   Total Score - - -   Total Score 13 9 9           Suicide Assessment Five-step Evaluation and Treatment (SAFE-T)      How many servings of fruits and vegetables do you eat daily?  2-3    On average, how many sweetened beverages do you drink each day (soda, juice, sweet tea, etc)?   2    How many days per week do you miss taking your medication? 0    Jimnea did meet with the dietitian on 1 time which she found helpful.  She is now working with a  through work on nutrition, meal prepping, and positive motivational growth.  She is optimistic that this is going to help with her weight loss and increase her exercise motivation.  She has not established therapeutic relationship with a formal therapist.  Overall she feels that the previous medications have helped.  She continues on her Adderall 30 mg once daily continues on the Effexor 225 mg once daily.    Patient Active Problem List   Diagnosis      CARDIOVASCULAR SCREENING; LDL GOAL LESS THAN 160     SLE (systemic lupus erythematosus) (H)     Sicca (H)     High risk medication use     Anxiety     Generalized anxiety disorder     Attention deficit hyperactivity disorder (ADHD)     Past Surgical History:   Procedure Laterality Date     BIOPSY       CHOLECYSTECTOMY         Social History     Tobacco Use     Smoking status: Former Smoker     Last attempt to quit: 2013     Years since quittin.3     Smokeless tobacco: Never Used     Tobacco comment: social   Substance Use Topics     Alcohol use: Yes     Comment: occ     Family History   Problem Relation Age of Onset     Hypertension Father      Breast Cancer Mother      Other Cancer Maternal Grandfather 87        Lymphoma     Diabetes Maternal Grandfather      Cancer Paternal Aunt      Cardiovascular Paternal Uncle         CHF,  in sleep     Hypertension Brother         hypertension, Etoh use     Hyperlipidemia Brother      Breast Cancer Paternal Grandmother      Colon Cancer No family hx of          Current Outpatient Medications   Medication Sig Dispense Refill     ALPRAZolam (XANAX) 0.5 MG tablet Take 1 tablet (0.5 mg) by mouth as needed for anxiety 1-2 tabs as needed 30 tablet 0     amphetamine-dextroamphetamine (ADDERALL XR) 30 MG 24 hr capsule Take 1 capsule (30 mg) by mouth daily 30 capsule 0     hydroxychloroquine (PLAQUENIL) 200 MG tablet Take 2 tablets (400 mg) by mouth daily 60 tablet 1     ibuprofen (ADVIL,MOTRIN) 800 MG tablet Take 1 tablet (800 mg) by mouth every 8 hours as needed for pain 90 tablet 1     omeprazole (PRILOSEC) 20 MG DR capsule Take 1 capsule (20 mg) by mouth once daily. Take 30-60 minutes before a meal 90 capsule 1     triamcinolone (KENALOG) 0.1 % external cream Apply sparingly to affected area two times daily as needed 453.6 g 0     venlafaxine (EFFEXOR-XR) 75 MG 24 hr capsule Take 3 capsules (225 mg) by mouth once daily. 270 capsule 3     Allergies   Allergen  Reactions     Nickel Chloride  [Nickel] Dermatitis     No Clinical Screening - See Comments Dermatitis     Zoloft [Sertraline] Anxiety     Cliches jaw, Increased anxiety     Recent Labs   Lab Test 09/23/19  1118 08/08/16  0844 01/14/15  1450  09/10/14  1106  05/08/13  1050   LDL  --   --   --   --  63  --  69   HDL  --   --   --   --  80  --   --    TRIG  --   --   --   --  63  --   --    ALT 34 17 18   < >  --    < >  --    CR 0.74 0.70 1.00   < >  --    < >  --    GFRESTIMATED >90 >90  Non  GFR Calc   64   < >  --    < >  --    GFRESTBLACK >90 >90   GFR Calc   77   < >  --    < >  --    POTASSIUM 4.0 4.5 3.8   < >  --    < >  --    TSH 2.66  --  0.76  --   --    < > 1.39    < > = values in this interval not displayed.      BP Readings from Last 3 Encounters:   11/04/19 124/88   09/23/19 114/84   02/13/19 110/70    Wt Readings from Last 3 Encounters:   11/04/19 95.5 kg (210 lb 9 oz)   09/23/19 95.7 kg (211 lb 1 oz)   02/13/19 90.7 kg (200 lb 1 oz)                      Reviewed and updated as needed this visit by Provider  Tobacco  Allergies  Meds  Problems  Med Hx  Surg Hx  Fam Hx         Review of Systems   ROS COMP: Constitutional, HEENT, cardiovascular, pulmonary, gi and gu systems are negative, except as otherwise noted.      Objective    /88 (BP Location: Right arm, Patient Position: Chair, Cuff Size: Adult Large)   Pulse 104   Temp 97.5  F (36.4  C) (Temporal)   Resp 18   Wt 95.5 kg (210 lb 9 oz)   LMP 10/31/2019   SpO2 100%   BMI 35.37 kg/m    Body mass index is 35.37 kg/m .  Physical Exam   GENERAL: healthy, alert and no distress  NECK: no adenopathy, no asymmetry, masses, or scars and thyroid normal to palpation  RESP: lungs clear to auscultation - no rales, rhonchi or wheezes  CV: regular rate and rhythm, normal S1 S2, no S3 or S4, no murmur, click or rub, no peripheral edema and peripheral pulses strong  ABDOMEN: soft, nontender, no hepatosplenomegaly,  no masses and bowel sounds normal  PSYCH: mentation appears normal, affect normal/bright    Diagnostic Test Results:  Labs reviewed in Epic  No results found for any visits on 11/04/19.        Assessment & Plan     1. Attention deficit hyperactivity disorder (ADHD), combined type  Chronic. Stable function with Adderall. The current medical regimen is effective;  continue present plan and medications.   - amphetamine-dextroamphetamine (ADDERALL XR) 30 MG 24 hr capsule; Take 1 capsule (30 mg) by mouth daily  Dispense: 30 capsule; Refill: 0  - amphetamine-dextroamphetamine (ADDERALL XR) 30 MG 24 hr capsule; Take 1 capsule (30 mg) by mouth daily  Dispense: 30 capsule; Refill: 0  - amphetamine-dextroamphetamine (ADDERALL XR) 30 MG 24 hr capsule; Take 1 capsule (30 mg) by mouth daily  Dispense: 30 capsule; Refill: 0       Work on weight loss  Regular exercise    Return in about 3 months (around 2/4/2020) for recheck depression and ADHD.    Richard Small MD  [facility]

## 2019-11-05 ASSESSMENT — ANXIETY QUESTIONNAIRES: GAD7 TOTAL SCORE: 9

## 2019-11-18 ENCOUNTER — MYC MEDICAL ADVICE (OUTPATIENT)
Dept: FAMILY MEDICINE | Facility: OTHER | Age: 39
End: 2019-11-18

## 2019-11-23 DIAGNOSIS — K21.9 GASTROESOPHAGEAL REFLUX DISEASE WITHOUT ESOPHAGITIS: ICD-10-CM

## 2019-11-25 NOTE — TELEPHONE ENCOUNTER
Prescription approved per [facility] Refill Protocol.    STACEY TorresN, RN  [facility]

## 2019-11-25 NOTE — TELEPHONE ENCOUNTER
"Requested Prescriptions   Pending Prescriptions Disp Refills     omeprazole (PRILOSEC) 20 MG DR capsule [Pharmacy Med Name: omeprazole (PRILOSEC) 20 mg oral delayed release capsule] 90 capsule 1     Sig: Take 1 capsule (20 mg) by mouth once daily. Take 30-60 minutes before a meal   Last Written Prescription Date:  4/19/19  Last Fill Quantity: 90,  # refills: 1   Last office visit: 11/4/2019 with prescribing provider:  11/4/19   Future Office Visit:   Next 5 appointments (look out 90 days)    Feb 05, 2020  9:40 AM [facility] Cal[facility] [address] [facility] [address] [phone]           PPI Protocol Passed - 11/23/2019 12:06 PM        Passed - Not on Clopidogrel (unless Pantoprazole ordered)        Passed - No diagnosis of osteoporosis on record        Passed - Recent (12 mo) or future (30 days) visit within the authorizing provider's specialty     Patient has had an office visit with the authorizing provider or a provider within the authorizing providers department within the previous 12 mos or has a future within next 30 days. See \"Patient Info\" tab in inbasket, or \"Choose Columns\" in Meds & Orders section of the refill encounter.              Passed - Medication is active on med list        Passed - Patient is age 18 or older        Passed - No active pregnacy on record        Passed - No positive pregnancy test in past 12 months        "

## 2020-02-05 ENCOUNTER — OFFICE VISIT (OUTPATIENT)
Dept: FAMILY MEDICINE | Facility: OTHER | Age: 40
End: 2020-02-05
Payer: COMMERCIAL

## 2020-02-05 ENCOUNTER — TELEPHONE (OUTPATIENT)
Dept: FAMILY MEDICINE | Facility: OTHER | Age: 40
End: 2020-02-05

## 2020-02-05 VITALS
HEIGHT: 64 IN | SYSTOLIC BLOOD PRESSURE: 118 MMHG | BODY MASS INDEX: 35.07 KG/M2 | WEIGHT: 205.44 LBS | OXYGEN SATURATION: 100 % | RESPIRATION RATE: 18 BRPM | TEMPERATURE: 96.9 F | HEART RATE: 108 BPM | DIASTOLIC BLOOD PRESSURE: 86 MMHG

## 2020-02-05 DIAGNOSIS — F41.9 ANXIETY: ICD-10-CM

## 2020-02-05 DIAGNOSIS — F90.2 ATTENTION DEFICIT HYPERACTIVITY DISORDER (ADHD), COMBINED TYPE: ICD-10-CM

## 2020-02-05 DIAGNOSIS — F33.1 MAJOR DEPRESSIVE DISORDER, RECURRENT EPISODE, MODERATE (H): ICD-10-CM

## 2020-02-05 DIAGNOSIS — M32.9 SYSTEMIC LUPUS ERYTHEMATOSUS, UNSPECIFIED SLE TYPE, UNSPECIFIED ORGAN INVOLVEMENT STATUS (H): ICD-10-CM

## 2020-02-05 PROCEDURE — 99214 OFFICE O/P EST MOD 30 MIN: CPT | Performed by: FAMILY MEDICINE

## 2020-02-05 RX ORDER — DEXTROAMPHETAMINE SACCHARATE, AMPHETAMINE ASPARTATE MONOHYDRATE, DEXTROAMPHETAMINE SULFATE AND AMPHETAMINE SULFATE 7.5; 7.5; 7.5; 7.5 MG/1; MG/1; MG/1; MG/1
30 CAPSULE, EXTENDED RELEASE ORAL DAILY
Qty: 30 CAPSULE | Refills: 0 | Status: CANCELLED | OUTPATIENT
Start: 2020-02-05

## 2020-02-05 RX ORDER — DEXTROAMPHETAMINE SACCHARATE, AMPHETAMINE ASPARTATE MONOHYDRATE, DEXTROAMPHETAMINE SULFATE AND AMPHETAMINE SULFATE 7.5; 7.5; 7.5; 7.5 MG/1; MG/1; MG/1; MG/1
30 CAPSULE, EXTENDED RELEASE ORAL DAILY
Qty: 30 CAPSULE | Refills: 0 | Status: SHIPPED | OUTPATIENT
Start: 2020-03-07 | End: 2020-05-12

## 2020-02-05 RX ORDER — ALPRAZOLAM 0.5 MG
0.5 TABLET ORAL PRN
Qty: 30 TABLET | Refills: 0 | Status: SHIPPED | OUTPATIENT
Start: 2020-02-05 | End: 2020-02-05

## 2020-02-05 RX ORDER — ALPRAZOLAM 0.5 MG
0.5 TABLET ORAL PRN
Qty: 30 TABLET | Refills: 0 | Status: SHIPPED | OUTPATIENT
Start: 2020-02-05 | End: 2021-05-28

## 2020-02-05 RX ORDER — DEXTROAMPHETAMINE SACCHARATE, AMPHETAMINE ASPARTATE MONOHYDRATE, DEXTROAMPHETAMINE SULFATE AND AMPHETAMINE SULFATE 7.5; 7.5; 7.5; 7.5 MG/1; MG/1; MG/1; MG/1
30 CAPSULE, EXTENDED RELEASE ORAL DAILY
Qty: 30 CAPSULE | Refills: 0 | Status: SHIPPED | OUTPATIENT
Start: 2020-02-05 | End: 2020-05-12

## 2020-02-05 RX ORDER — DEXTROAMPHETAMINE SACCHARATE, AMPHETAMINE ASPARTATE MONOHYDRATE, DEXTROAMPHETAMINE SULFATE AND AMPHETAMINE SULFATE 7.5; 7.5; 7.5; 7.5 MG/1; MG/1; MG/1; MG/1
30 CAPSULE, EXTENDED RELEASE ORAL DAILY
Qty: 30 CAPSULE | Refills: 0 | Status: SHIPPED | OUTPATIENT
Start: 2020-04-07 | End: 2020-09-30

## 2020-02-05 ASSESSMENT — PAIN SCALES - GENERAL: PAINLEVEL: NO PAIN (0)

## 2020-02-05 ASSESSMENT — ANXIETY QUESTIONNAIRES
2. NOT BEING ABLE TO STOP OR CONTROL WORRYING: MORE THAN HALF THE DAYS
6. BECOMING EASILY ANNOYED OR IRRITABLE: SEVERAL DAYS
GAD7 TOTAL SCORE: 6
5. BEING SO RESTLESS THAT IT IS HARD TO SIT STILL: NOT AT ALL
3. WORRYING TOO MUCH ABOUT DIFFERENT THINGS: SEVERAL DAYS
1. FEELING NERVOUS, ANXIOUS, OR ON EDGE: SEVERAL DAYS
7. FEELING AFRAID AS IF SOMETHING AWFUL MIGHT HAPPEN: SEVERAL DAYS
IF YOU CHECKED OFF ANY PROBLEMS ON THIS QUESTIONNAIRE, HOW DIFFICULT HAVE THESE PROBLEMS MADE IT FOR YOU TO DO YOUR WORK, TAKE CARE OF THINGS AT HOME, OR GET ALONG WITH OTHER PEOPLE: SOMEWHAT DIFFICULT

## 2020-02-05 ASSESSMENT — PATIENT HEALTH QUESTIONNAIRE - PHQ9
SUM OF ALL RESPONSES TO PHQ QUESTIONS 1-9: 8
5. POOR APPETITE OR OVEREATING: NOT AT ALL

## 2020-02-05 ASSESSMENT — MIFFLIN-ST. JEOR: SCORE: 1600.04

## 2020-02-05 NOTE — NURSING NOTE
Health Maintenance Due   Topic Date Due     DEPRESSION ACTION PLAN  1981     HIV SCREENING  02/18/1996     INFLUENZA VACCINE (1) 09/01/2019     LIPID  09/10/2019     PREVENTIVE CARE VISIT  11/07/2019       Health Maintenance reviewed at today's visit patient asked to schedule/complete:   Routine Health Visit:  Patient agrees to schedule    Ibis Morelos MA 2/5/2020  9:42 AM

## 2020-02-05 NOTE — TELEPHONE ENCOUNTER
Reason for Call:  Medication or medication refill:    Do you use a [facility]?  Name of the pharmacy and phone number for the current request:  [facility] pharmacy [phone]    Name of the medication requested: Zanax  Other request: Epifanio from the pharmacy Is calling stating that the zanax prescription that was sent In has 2 different directions on it and wants to know if he is filling the one for once a day or 1-2 times a day.  Please advise.  Can we leave a detailed message on this number? YES    Phone number patient can be reached at: Other phone number:  [phone]  Best Time: anytime    Call taken on 2/5/2020 at 10:14 AM by Madi R. Seipel Vaccari

## 2020-02-05 NOTE — PROGRESS NOTES
Subjective     Jimena Moreira is a 39 year old female who presents to clinic today for the following health issues:    HPI   Depression and Anxiety Follow-Up    How are you doing with your depression since your last visit? Improved     How are you doing with your anxiety since your last visit?  Improved     Are you having other symptoms that might be associated with depression or anxiety? No    Have you had a significant life event? OTHER: loss of dog     Do you have any concerns with your use of alcohol or other drugs? No    Social History     Tobacco Use     Smoking status: Former Smoker     Last attempt to quit: 2013     Years since quittin.6     Smokeless tobacco: Never Used     Tobacco comment: social   Substance Use Topics     Alcohol use: Yes     Comment: occ     Drug use: No     PHQ 2019   PHQ-9 Total Score 9 7 8   Q9: Thoughts of better off dead/self-harm past 2 weeks Not at all Not at all Not at all     JET-7 SCORE 2019   Total Score - - -   Total Score 9 9 6     Last PHQ-9 2020   1.  Little interest or pleasure in doing things 1   2.  Feeling down, depressed, or hopeless 1   3.  Trouble falling or staying asleep, or sleeping too much 3   4.  Feeling tired or having little energy 1   5.  Poor appetite or overeating 0   6.  Feeling bad about yourself 1   7.  Trouble concentrating 1   8.  Moving slowly or restless 0   Q9: Thoughts of better off dead/self-harm past 2 weeks 0   PHQ-9 Total Score 8   Difficulty at work, home, or with people Somewhat difficult     JET-7  2020   1. Feeling nervous, anxious, or on edge 1   2. Not being able to stop or control worrying 2   3. Worrying too much about different things 1   4. Trouble relaxing 0   5. Being so restless that it is hard to sit still 0   6. Becoming easily annoyed or irritable 1   7. Feeling afraid, as if something awful might happen 1   JET-7 Total Score 6   If you checked any problems,  how difficult have they made it for you to do your work, take care of things at home, or get along with other people? Somewhat difficult         Suicide Assessment Five-step Evaluation and Treatment (SAFE-T)      How many servings of fruits and vegetables do you eat daily?  2-3    On average, how many sweetened beverages do you drink each day (Examples: soda, juice, sweet tea, etc.  Do NOT count diet or artificially sweetened beverages)?   0    How many days per week do you exercise enough to make your heart beat faster? 4    How many minutes a day do you exercise enough to make your heart beat faster? 10 - 19    How many days per week do you miss taking your medication? 0    Jimena did meet with the dietitian on 1 time which she found helpful.  She is now working with a coworker and   through work on nutrition, meal prepping, and positive motivational growth. She has an opportunity through a work program, [employer], to get a digital scale and virtual  as well.  She is optimistic that this is going to help with her weight loss and increase her exercise motivation.  She has established therapeutic relationship with a formal therapist through [facility].  Overall she feels that the previous medications have helped.  She continues on her Adderall 30 mg once daily continues on the Effexor 225 mg once daily.    She has lost 5 lbs in the last 3 months.    Patient Active Problem List   Diagnosis     CARDIOVASCULAR SCREENING; LDL GOAL LESS THAN 160     SLE (systemic lupus erythematosus) (H)     Sicca (H)     High risk medication use     Anxiety     Generalized anxiety disorder     Attention deficit hyperactivity disorder (ADHD)     Past Surgical History:   Procedure Laterality Date     BIOPSY       CHOLECYSTECTOMY         Social History     Tobacco Use     Smoking status: Former Smoker     Last attempt to quit: 2013     Years since quittin.6     Smokeless tobacco: Never Used      Tobacco comment: social   Substance Use Topics     Alcohol use: Yes     Comment: occ     Family History   Problem Relation Age of Onset     Hypertension Father      Breast Cancer Mother      Other Cancer Maternal Grandfather 87        Lymphoma     Diabetes Maternal Grandfather      Cancer Paternal Aunt      Cardiovascular Paternal Uncle         CHF,  in sleep     Hypertension Brother         hypertension, Etoh use     Hyperlipidemia Brother      Breast Cancer Paternal Grandmother      Colon Cancer No family hx of          Current Outpatient Medications   Medication Sig Dispense Refill     ALPRAZolam (XANAX) 0.5 MG tablet Take 1 tablet (0.5 mg) by mouth as needed for anxiety 1-2 tabs as needed 30 tablet 0     hydroxychloroquine (PLAQUENIL) 200 MG tablet Take 2 tablets (400 mg) by mouth daily 60 tablet 1     ibuprofen (ADVIL,MOTRIN) 800 MG tablet Take 1 tablet (800 mg) by mouth every 8 hours as needed for pain 90 tablet 1     omeprazole (PRILOSEC) 20 MG DR capsule Take 1 capsule (20 mg) by mouth once daily. Take 30-60 minutes before a meal 90 capsule 1     triamcinolone (KENALOG) 0.1 % external cream Apply sparingly to affected area two times daily as needed 453.6 g 0     venlafaxine (EFFEXOR-XR) 75 MG 24 hr capsule Take 3 capsules (225 mg) by mouth once daily. 270 capsule 3     amphetamine-dextroamphetamine (ADDERALL XR) 30 MG 24 hr capsule Take 1 capsule (30 mg) by mouth daily 30 capsule 0     Allergies   Allergen Reactions     Nickel Chloride  [Nickel] Dermatitis     No Clinical Screening - See Comments Dermatitis     Zoloft [Sertraline] Anxiety     Cliches jaw, Increased anxiety     Recent Labs   Lab Test 19  1118 16  0844 01/14/15  1450  09/10/14  1106  13  1050   LDL  --   --   --   --  63  --  69   HDL  --   --   --   --  80  --   --    TRIG  --   --   --   --  63  --   --    ALT 34 17 18   < >  --    < >  --    CR 0.74 0.70 1.00   < >  --    < >  --    GFRESTIMATED >90 >90  Non   "American GFR Calc   64   < >  --    < >  --    GFRESTBLACK >90 >90   GFR Calc   77   < >  --    < >  --    POTASSIUM 4.0 4.5 3.8   < >  --    < >  --    TSH 2.66  --  0.76  --   --    < > 1.39    < > = values in this interval not displayed.      BP Readings from Last 3 Encounters:   02/05/20 118/86   11/04/19 124/88   09/23/19 114/84    Wt Readings from Last 3 Encounters:   02/05/20 93.2 kg (205 lb 7 oz)   11/04/19 95.5 kg (210 lb 9 oz)   09/23/19 95.7 kg (211 lb 1 oz)                      Reviewed and updated as needed this visit by Provider  Tobacco  Allergies  Meds  Problems  Med Hx  Surg Hx  Fam Hx         Review of Systems   ROS COMP: CONSTITUTIONAL: NEGATIVE for fever, chills, change in weight  INTEGUMENTARY/SKIN: POSITIVE for pruritis and rash on shoulders and arms after working out.  ENT/MOUTH: NEGATIVE for ear, mouth and throat problems  RESP: NEGATIVE for significant cough or SOB  CV: NEGATIVE for chest pain, palpitations or peripheral edema  ROS otherwise negative      Objective    /86 (BP Location: Left arm, Patient Position: Chair, Cuff Size: Adult Regular)   Pulse 108   Temp 96.9  F (36.1  C) (Temporal)   Resp 18   Ht 1.631 m (5' 4.2\")   Wt 93.2 kg (205 lb 7 oz)   LMP 01/28/2020   SpO2 100%   BMI 35.04 kg/m    Body mass index is 35.04 kg/m .  Physical Exam   GENERAL: healthy, alert and no distress  NECK: no adenopathy, no asymmetry, masses, or scars and thyroid normal to palpation  RESP: lungs clear to auscultation - no rales, rhonchi or wheezes  CV: regular rate and rhythm, normal S1 S2, no S3 or S4, no murmur, click or rub, no peripheral edema and peripheral pulses strong  ABDOMEN: soft, nontender, no hepatosplenomegaly, no masses and bowel sounds normal  SKIN: no suspicious lesions or rashes  PSYCH: mentation appears normal, affect normal/bright    Diagnostic Test Results:  Labs reviewed in Epic        Assessment & Plan     1. Attention deficit hyperactivity " "disorder (ADHD), combined type  Chronic,stable and functioning well at work and home. The current medical regimen is effective;  continue present plan and medications.   - amphetamine-dextroamphetamine (ADDERALL XR) 30 MG 24 hr capsule; Take 1 capsule (30 mg) by mouth daily  Dispense: 30 capsule; Refill: 0  - amphetamine-dextroamphetamine (ADDERALL XR) 30 MG 24 hr capsule; Take 1 capsule (30 mg) by mouth daily  Dispense: 30 capsule; Refill: 0  - amphetamine-dextroamphetamine (ADDERALL XR) 30 MG 24 hr capsule; Take 1 capsule (30 mg) by mouth daily  Dispense: 30 capsule; Refill: 0    2. Anxiety  Chronic, improving. She uses Xanax rarely.  Her last refill for 30 tablets was >1 year ago.   - ALPRAZolam (XANAX) 0.5 MG tablet; Take 1 tablet (0.5 mg) by mouth as needed for anxiety 1-2 tabs as needed  Dispense: 30 tablet; Refill: 0    3. Systemic lupus erythematosus, unspecified SLE type, unspecified organ involvement status (H)  Chronic, stable on Plaquenil  follow up with rheumatology    4. Major depressive disorder, recurrent episode, moderate (H)  Chronic stable. The current medical regimen is effective;  continue present plan and medications. Continue with therapist.       BMI:   Estimated body mass index is 35.04 kg/m  as calculated from the following:    Height as of this encounter: 1.631 m (5' 4.2\").    Weight as of this encounter: 93.2 kg (205 lb 7 oz).   Weight management plan: Discussed healthy diet and exercise guidelines        Work on weight loss  Regular exercise    Return in about 3 months (around 5/5/2020) for depression recheck.    Richard Small MD  [facility]      "

## 2020-02-06 ASSESSMENT — ANXIETY QUESTIONNAIRES: GAD7 TOTAL SCORE: 6

## 2020-02-23 ENCOUNTER — HEALTH MAINTENANCE LETTER (OUTPATIENT)
Age: 40
End: 2020-02-23

## 2020-03-05 DIAGNOSIS — F90.2 ATTENTION DEFICIT HYPERACTIVITY DISORDER (ADHD), COMBINED TYPE: ICD-10-CM

## 2020-03-05 RX ORDER — DEXTROAMPHETAMINE SULFATE, DEXTROAMPHETAMINE SACCHARATE, AMPHETAMINE SULFATE AND AMPHETAMINE ASPARTATE 7.5; 7.5; 7.5; 7.5 MG/1; MG/1; MG/1; MG/1
CAPSULE, EXTENDED RELEASE ORAL
Qty: 30 CAPSULE | Refills: 0 | OUTPATIENT
Start: 2020-03-05

## 2020-03-05 NOTE — TELEPHONE ENCOUNTER
Adderall XR 30 MG       Last Written Prescription Date:  2/5/2020  Last Fill Quantity: 30,   # refills: 0  Last Office Visit: 2/5/2020  Future Office visit:       Routing refill request to provider for review/approval because:  Drug not on the [facility], [facility] or [facility] refill protocol or controlled substance

## 2020-03-05 NOTE — TELEPHONE ENCOUNTER
Please call pharmacy. Prior refill on 2/5/20 with 3 sequential refills.   Electronically signed by Richard Small MD      Routing comment

## 2020-03-05 NOTE — TELEPHONE ENCOUNTER
Called pharmacy and pharmacist states they have 2 on file for her. He doesn't know why we received this request.  Disregard request.  Tanya Cage, [facility]

## 2020-04-05 DIAGNOSIS — M32.9 SYSTEMIC LUPUS ERYTHEMATOSUS, UNSPECIFIED SLE TYPE, UNSPECIFIED ORGAN INVOLVEMENT STATUS (H): ICD-10-CM

## 2020-04-06 NOTE — TELEPHONE ENCOUNTER
Requested Prescriptions   Pending Prescriptions Disp Refills     hydroxychloroquine (PLAQUENIL) 200 MG tablet [Pharmacy Med Name: hydroxychloroquine (PLAQUENIL) 200 mg oral tablet] 60 tablet 1     Sig: Take 2 tablets (400 mg) by mouth once daily.     Last Written Prescription Date:  11/07/2018  Last Fill Quantity: 60,   # refills: 1  Last Office Visit: 02/05/2020  Future Office visit:       Routing refill request to provider for review/approval because:  Drug not on the [facility] or [facility] refill protocol or controlled substance.     Routing to covering provider to sign.     Nely Montana MA

## 2020-04-07 RX ORDER — HYDROXYCHLOROQUINE SULFATE 200 MG/1
TABLET, FILM COATED ORAL
Qty: 60 TABLET | Refills: 1 | Status: SHIPPED | OUTPATIENT
Start: 2020-04-07 | End: 2020-09-30

## 2020-05-11 ENCOUNTER — TELEPHONE (OUTPATIENT)
Dept: FAMILY MEDICINE | Facility: OTHER | Age: 40
End: 2020-05-11

## 2020-05-11 NOTE — TELEPHONE ENCOUNTER
Prior Authorization Retail Medication Request    Medication/Dose: hydroxychloroquine (PLAQUENIL) 200 MG tablet   ICD code (if different than what is on RX):  Systemic lupus erythematosus, unspecified SLE type, unspecified organ involvement status (H) [M32.9]   Previously Tried and Failed:  na  Rationale:  PA needed for quantity    Insurance Name:  [facility]  Insurance ID:  [id number]      Pharmacy Information (if different than what is on RX)  Name:  [facility]  Phone:  [phone]

## 2020-05-12 ENCOUNTER — VIRTUAL VISIT (OUTPATIENT)
Dept: FAMILY MEDICINE | Facility: CLINIC | Age: 40
End: 2020-05-12
Payer: COMMERCIAL

## 2020-05-12 DIAGNOSIS — R58 ECCHYMOSIS: Primary | ICD-10-CM

## 2020-05-12 PROCEDURE — 99213 OFFICE O/P EST LOW 20 MIN: CPT | Mod: 95 | Performed by: OBSTETRICS & GYNECOLOGY

## 2020-05-12 NOTE — TELEPHONE ENCOUNTER
Prior Authorization Approval    Authorization Effective Date: 5/8/2020  Authorization Expiration Date: 5/8/2021  Medication: hydroxychloroquine (PLAQUENIL) 200 MG-APPROVED  Approved Dose/Quantity:    Reference #:     Insurance Company: [employer] [place] - Phone [phone] Fax [phone]  Expected CoPay:       CoPay Card Available:      Foundation Assistance Needed:    Which Pharmacy is filling the prescription (Not needed for infusion/clinic administered): [address]  Pharmacy Notified: Yes  Patient Notified: Yes  **Instructed pharmacy to notify patient when script is ready to /ship.**

## 2020-05-12 NOTE — PROGRESS NOTES
"Jimena Moreira is a 40 year old female who is being evaluated via a billable video visit.      The patient has been notified of following:     \"This video visit will be conducted via a call between you and your physician/provider. We have found that certain health care needs can be provided without the need for an in-person physical exam.  This service lets us provide the care you need with a video conversation.  If a prescription is necessary we can send it directly to your pharmacy.  If lab work is needed we can place an order for that and you can then stop by our lab to have the test done at a later time.    Video visits are billed at different rates depending on your insurance coverage.  Please reach out to your insurance provider with any questions.    If during the course of the call the physician/provider feels a video visit is not appropriate, you will not be charged for this service.\"    Patient has given verbal consent for Video visit? Yes    How would you like to obtain your AVS? Branden    Patient would like the video invitation sent by: Text to cell phone: [phone]    Will anyone else be joining your video visit? No        Subjective: Jimena requested a videoconference consultation today because of concerns regarding bruising. Due to the current covid-19 coronavirus epidemic we are managing much of our patients' concerns remotely when possible.    She has a history of lupus and takes plaquenil- since 2011. Has no hx of bruising or nosebleeds. Yesterday developed some mild bruising on her dorsal thighs. No pain in calves or legs. Also had a nosebleed yesterday- sx resolved today. No further bruising noted. No fevers or cough or any other concerning sx.    The past medical history and medications and allergies have been reviewed today by me.  .  Past Medical History:   Diagnosis Date     ADHD (attention deficit hyperactivity disorder)      Anxiety      Discoid lupus      Lupus (H)      Sjogrens " syndrome (H)      Allergies   Allergen Reactions     Nickel Chloride  [Nickel] Dermatitis     No Clinical Screening - See Comments Dermatitis     Zoloft [Sertraline] Anxiety     Cliches jaw, Increased anxiety     Current Outpatient Medications   Medication Sig Dispense Refill     ALPRAZolam (XANAX) 0.5 MG tablet Take 1 tablet (0.5 mg) by mouth as needed for anxiety 30 tablet 0     hydroxychloroquine (PLAQUENIL) 200 MG tablet Take 2 tablets (400 mg) by mouth once daily. 60 tablet 1     ibuprofen (ADVIL,MOTRIN) 800 MG tablet Take 1 tablet (800 mg) by mouth every 8 hours as needed for pain 90 tablet 1     omeprazole (PRILOSEC) 20 MG DR capsule Take 1 capsule (20 mg) by mouth once daily. Take 30-60 minutes before a meal 90 capsule 1     triamcinolone (KENALOG) 0.1 % external cream Apply sparingly to affected area two times daily as needed 453.6 g 0     venlafaxine (EFFEXOR-XR) 75 MG 24 hr capsule Take 3 capsules (225 mg) by mouth once daily. 270 capsule 3     amphetamine-dextroamphetamine (ADDERALL XR) 30 MG 24 hr capsule Take 1 capsule (30 mg) by mouth daily 30 capsule 0       Assessment/Plan:   Ecchymosis-and 1 episode of epistaxis- I think this may be related to the plaquenil- I suggested that we check a CBC-with platelets- if it is normal,. I will reassure her and watch for further sx. If she develops more bruising or nosebleeds or any other sx then she will likely need an in-clinic evaluation. She promised to notify me if there are more sx.        HOMERO Ayala MD

## 2020-05-12 NOTE — TELEPHONE ENCOUNTER
Central Prior Authorization Team   Phone: [phone]    PA Initiation    Medication: hydroxychloroquine (PLAQUENIL) 200 MG tablet   Insurance Company: [employer] [place] - Phone [phone] Fax [phone]  Pharmacy Filling the Rx: [facility] [address] [place] [address]  Filling Pharmacy Phone: [phone]  Filling Pharmacy Fax: [phone]  Start Date: 5/12/2020

## 2020-06-08 ENCOUNTER — MYC REFILL (OUTPATIENT)
Dept: FAMILY MEDICINE | Facility: OTHER | Age: 40
End: 2020-06-08

## 2020-06-08 DIAGNOSIS — F90.2 ATTENTION DEFICIT HYPERACTIVITY DISORDER (ADHD), COMBINED TYPE: ICD-10-CM

## 2020-06-08 RX ORDER — DEXTROAMPHETAMINE SACCHARATE, AMPHETAMINE ASPARTATE MONOHYDRATE, DEXTROAMPHETAMINE SULFATE AND AMPHETAMINE SULFATE 7.5; 7.5; 7.5; 7.5 MG/1; MG/1; MG/1; MG/1
30 CAPSULE, EXTENDED RELEASE ORAL DAILY
Qty: 30 CAPSULE | Refills: 0 | Status: SHIPPED | OUTPATIENT
Start: 2020-07-09 | End: 2020-09-30

## 2020-06-08 RX ORDER — DEXTROAMPHETAMINE SACCHARATE, AMPHETAMINE ASPARTATE MONOHYDRATE, DEXTROAMPHETAMINE SULFATE AND AMPHETAMINE SULFATE 7.5; 7.5; 7.5; 7.5 MG/1; MG/1; MG/1; MG/1
30 CAPSULE, EXTENDED RELEASE ORAL DAILY
Qty: 30 CAPSULE | Refills: 0 | Status: SHIPPED | OUTPATIENT
Start: 2020-08-09 | End: 2020-09-30

## 2020-06-08 RX ORDER — DEXTROAMPHETAMINE SACCHARATE, AMPHETAMINE ASPARTATE MONOHYDRATE, DEXTROAMPHETAMINE SULFATE AND AMPHETAMINE SULFATE 7.5; 7.5; 7.5; 7.5 MG/1; MG/1; MG/1; MG/1
30 CAPSULE, EXTENDED RELEASE ORAL DAILY
Qty: 30 CAPSULE | Refills: 0 | Status: SHIPPED | OUTPATIENT
Start: 2020-06-08 | End: 2020-09-30

## 2020-06-08 RX ORDER — DEXTROAMPHETAMINE SACCHARATE, AMPHETAMINE ASPARTATE MONOHYDRATE, DEXTROAMPHETAMINE SULFATE AND AMPHETAMINE SULFATE 7.5; 7.5; 7.5; 7.5 MG/1; MG/1; MG/1; MG/1
30 CAPSULE, EXTENDED RELEASE ORAL DAILY
Qty: 30 CAPSULE | Refills: 0 | Status: CANCELLED | OUTPATIENT
Start: 2020-06-08

## 2020-06-30 DIAGNOSIS — K21.9 GASTROESOPHAGEAL REFLUX DISEASE WITHOUT ESOPHAGITIS: ICD-10-CM

## 2020-06-30 NOTE — TELEPHONE ENCOUNTER
"  Requested Prescriptions   Pending Prescriptions Disp Refills     omeprazole (PRILOSEC) 20 MG DR capsule [Pharmacy Med Name: omeprazole (PRILOSEC) 20 mg oral delayed release capsule] 90 capsule 1     Sig: Take 1 capsule (20 mg) by mouth once daily. Take 30-60 minutes before a meal.   Last Written Prescription Date:  11/25/2019  Last Fill Quantity: 90,  # refills: 1   Last office visit: 5/12/2020 with prescribing provider:     Future Office Visit:        PPI Protocol Passed - 6/30/2020  7:56 AM        Passed - Not on Clopidogrel (unless Pantoprazole ordered)        Passed - No diagnosis of osteoporosis on record        Passed - Recent (12 mo) or future (30 days) visit within the authorizing provider's specialty     Patient has had an office visit with the authorizing provider or a provider within the authorizing providers department within the previous 12 mos or has a future within next 30 days. See \"Patient Info\" tab in inbasket, or \"Choose Columns\" in Meds & Orders section of the refill encounter.              Passed - Medication is active on med list        Passed - Patient is age 18 or older        Passed - No active pregnacy on record        Passed - No positive pregnancy test in past 12 months         Prescription approved per [facility] Refill Protocol.  Purnima Gomez RN      "

## 2020-09-30 ENCOUNTER — OFFICE VISIT (OUTPATIENT)
Dept: FAMILY MEDICINE | Facility: CLINIC | Age: 40
End: 2020-09-30
Payer: COMMERCIAL

## 2020-09-30 VITALS
DIASTOLIC BLOOD PRESSURE: 84 MMHG | TEMPERATURE: 98.3 F | BODY MASS INDEX: 36.91 KG/M2 | OXYGEN SATURATION: 97 % | SYSTOLIC BLOOD PRESSURE: 120 MMHG | RESPIRATION RATE: 18 BRPM | WEIGHT: 216.38 LBS | HEART RATE: 104 BPM

## 2020-09-30 DIAGNOSIS — Z00.00 ROUTINE GENERAL MEDICAL EXAMINATION AT A HEALTH CARE FACILITY: Primary | ICD-10-CM

## 2020-09-30 DIAGNOSIS — Z12.31 ENCOUNTER FOR SCREENING MAMMOGRAM FOR BREAST CANCER: ICD-10-CM

## 2020-09-30 DIAGNOSIS — K21.9 GASTROESOPHAGEAL REFLUX DISEASE WITHOUT ESOPHAGITIS: ICD-10-CM

## 2020-09-30 DIAGNOSIS — M32.9 SYSTEMIC LUPUS ERYTHEMATOSUS, UNSPECIFIED SLE TYPE, UNSPECIFIED ORGAN INVOLVEMENT STATUS (H): ICD-10-CM

## 2020-09-30 DIAGNOSIS — Z23 NEED FOR VACCINATION: ICD-10-CM

## 2020-09-30 DIAGNOSIS — F41.9 ANXIETY: ICD-10-CM

## 2020-09-30 DIAGNOSIS — F90.2 ATTENTION DEFICIT HYPERACTIVITY DISORDER (ADHD), COMBINED TYPE: ICD-10-CM

## 2020-09-30 DIAGNOSIS — Z23 NEED FOR PROPHYLACTIC VACCINATION AND INOCULATION AGAINST INFLUENZA: ICD-10-CM

## 2020-09-30 LAB
ALBUMIN SERPL-MCNC: 3.9 G/DL (ref 3.4–5)
ALP SERPL-CCNC: 97 U/L (ref 40–150)
ALT SERPL W P-5'-P-CCNC: 30 U/L (ref 0–50)
ANION GAP SERPL CALCULATED.3IONS-SCNC: 5 MMOL/L (ref 3–14)
AST SERPL W P-5'-P-CCNC: 16 U/L (ref 0–45)
BILIRUB SERPL-MCNC: 0.2 MG/DL (ref 0.2–1.3)
BUN SERPL-MCNC: 11 MG/DL (ref 7–30)
CALCIUM SERPL-MCNC: 8.9 MG/DL (ref 8.5–10.1)
CHLORIDE SERPL-SCNC: 104 MMOL/L (ref 94–109)
CO2 SERPL-SCNC: 29 MMOL/L (ref 20–32)
CREAT SERPL-MCNC: 0.75 MG/DL (ref 0.52–1.04)
ERYTHROCYTE [DISTWIDTH] IN BLOOD BY AUTOMATED COUNT: 13.6 % (ref 10–15)
GFR SERPL CREATININE-BSD FRML MDRD: >90 ML/MIN/{1.73_M2}
GLUCOSE SERPL-MCNC: 92 MG/DL (ref 70–99)
HCT VFR BLD AUTO: 42.8 % (ref 35–47)
HGB BLD-MCNC: 14.1 G/DL (ref 11.7–15.7)
MCH RBC QN AUTO: 29.4 PG (ref 26.5–33)
MCHC RBC AUTO-ENTMCNC: 32.9 G/DL (ref 31.5–36.5)
MCV RBC AUTO: 89 FL (ref 78–100)
PLATELET # BLD AUTO: 407 10E9/L (ref 150–450)
POTASSIUM SERPL-SCNC: 4.1 MMOL/L (ref 3.4–5.3)
PROT SERPL-MCNC: 8.2 G/DL (ref 6.8–8.8)
RBC # BLD AUTO: 4.8 10E12/L (ref 3.8–5.2)
SODIUM SERPL-SCNC: 138 MMOL/L (ref 133–144)
WBC # BLD AUTO: 8.7 10E9/L (ref 4–11)

## 2020-09-30 PROCEDURE — 90471 IMMUNIZATION ADMIN: CPT | Performed by: FAMILY MEDICINE

## 2020-09-30 PROCEDURE — 80053 COMPREHEN METABOLIC PANEL: CPT | Performed by: FAMILY MEDICINE

## 2020-09-30 PROCEDURE — 90714 TD VACC NO PRESV 7 YRS+ IM: CPT | Performed by: FAMILY MEDICINE

## 2020-09-30 PROCEDURE — 85027 COMPLETE CBC AUTOMATED: CPT | Performed by: FAMILY MEDICINE

## 2020-09-30 PROCEDURE — 36415 COLL VENOUS BLD VENIPUNCTURE: CPT | Performed by: FAMILY MEDICINE

## 2020-09-30 PROCEDURE — 99395 PREV VISIT EST AGE 18-39: CPT | Mod: 25 | Performed by: FAMILY MEDICINE

## 2020-09-30 RX ORDER — DEXTROAMPHETAMINE SACCHARATE, AMPHETAMINE ASPARTATE MONOHYDRATE, DEXTROAMPHETAMINE SULFATE AND AMPHETAMINE SULFATE 7.5; 7.5; 7.5; 7.5 MG/1; MG/1; MG/1; MG/1
30 CAPSULE, EXTENDED RELEASE ORAL DAILY
Qty: 30 CAPSULE | Refills: 0 | Status: SHIPPED | OUTPATIENT
Start: 2020-09-30 | End: 2020-10-30

## 2020-09-30 RX ORDER — DEXTROAMPHETAMINE SACCHARATE, AMPHETAMINE ASPARTATE MONOHYDRATE, DEXTROAMPHETAMINE SULFATE AND AMPHETAMINE SULFATE 7.5; 7.5; 7.5; 7.5 MG/1; MG/1; MG/1; MG/1
30 CAPSULE, EXTENDED RELEASE ORAL DAILY
Qty: 30 CAPSULE | Refills: 0 | Status: SHIPPED | OUTPATIENT
Start: 2020-12-01 | End: 2020-12-31

## 2020-09-30 RX ORDER — ALPRAZOLAM 0.5 MG
0.5 TABLET ORAL PRN
Qty: 30 TABLET | Refills: 0 | Status: CANCELLED | OUTPATIENT
Start: 2020-09-30

## 2020-09-30 RX ORDER — HYDROXYCHLOROQUINE SULFATE 200 MG/1
TABLET, FILM COATED ORAL
Qty: 180 TABLET | Refills: 4 | Status: SHIPPED | OUTPATIENT
Start: 2020-09-30 | End: 2021-10-22

## 2020-09-30 RX ORDER — VENLAFAXINE HYDROCHLORIDE 75 MG/1
CAPSULE, EXTENDED RELEASE ORAL
Qty: 270 CAPSULE | Refills: 4 | Status: SHIPPED | OUTPATIENT
Start: 2020-09-30 | End: 2021-10-22

## 2020-09-30 RX ORDER — DEXTROAMPHETAMINE SACCHARATE, AMPHETAMINE ASPARTATE MONOHYDRATE, DEXTROAMPHETAMINE SULFATE AND AMPHETAMINE SULFATE 7.5; 7.5; 7.5; 7.5 MG/1; MG/1; MG/1; MG/1
30 CAPSULE, EXTENDED RELEASE ORAL DAILY
Qty: 30 CAPSULE | Refills: 0 | Status: CANCELLED | OUTPATIENT
Start: 2020-09-30

## 2020-09-30 RX ORDER — DEXTROAMPHETAMINE SACCHARATE, AMPHETAMINE ASPARTATE MONOHYDRATE, DEXTROAMPHETAMINE SULFATE AND AMPHETAMINE SULFATE 7.5; 7.5; 7.5; 7.5 MG/1; MG/1; MG/1; MG/1
30 CAPSULE, EXTENDED RELEASE ORAL DAILY
Qty: 30 CAPSULE | Refills: 0 | Status: SHIPPED | OUTPATIENT
Start: 2020-10-31 | End: 2020-11-30

## 2020-09-30 ASSESSMENT — ENCOUNTER SYMPTOMS
WEAKNESS: 1
MYALGIAS: 1
FEVER: 0
ARTHRALGIAS: 1
HEARTBURN: 1
NAUSEA: 0
PALPITATIONS: 0
BREAST MASS: 0
DIARRHEA: 1
SHORTNESS OF BREATH: 0
HEMATURIA: 0
ABDOMINAL PAIN: 0
HEMATOCHEZIA: 0
FREQUENCY: 0
CHILLS: 0
EYE PAIN: 0
CONSTIPATION: 0
NERVOUS/ANXIOUS: 1
DIZZINESS: 1
DYSURIA: 0
COUGH: 0
SORE THROAT: 0
HEADACHES: 1

## 2020-09-30 ASSESSMENT — PATIENT HEALTH QUESTIONNAIRE - PHQ9: SUM OF ALL RESPONSES TO PHQ QUESTIONS 1-9: 5

## 2020-09-30 ASSESSMENT — PAIN SCALES - GENERAL: PAINLEVEL: NO PAIN (1)

## 2020-09-30 NOTE — NURSING NOTE
Prior to immunization administration, verified patients identity using patient s name and date of birth. Please see Immunization Activity for additional information.     Screening Questionnaire for Adult Immunization    Are you sick today?   No   Do you have allergies to medications, food, a vaccine component or latex?   No   Have you ever had a serious reaction after receiving a vaccination?   No   Do you have a long-term health problem with heart, lung, kidney, or metabolic disease (e.g., diabetes), asthma, a blood disorder, no spleen, complement component deficiency, a cochlear implant, or a spinal fluid leak?  Are you on long-term aspirin therapy?   Yes, lupus   Do you have cancer, leukemia, HIV/AIDS, or any other immune system problem?   Yes, lupus   Do you have a parent, brother, or sister with an immune system problem?   No   In the past 3 months, have you taken medications that affect  your immune system, such as prednisone, other steroids, or anticancer drugs; drugs for the treatment of rheumatoid arthritis, Crohn s disease, or psoriasis; or have you had radiation treatments?   Yes, plaquenil    Have you had a seizure, or a brain or other nervous system problem?   No   During the past year, have you received a transfusion of blood or blood    products, or been given immune (gamma) globulin or antiviral drug?   No   For women: Are you pregnant or is there a chance you could become       pregnant during the next month?   No   Have you received any vaccinations in the past 4 weeks?   No     Immunization questionnaire was positive for at least one answer.  Notified Dr. Small.        Per orders of Dr. Small, injection of TD  given by Ibis Morelos MA. Patient instructed to remain in clinic for 15 minutes afterwards, and to report any adverse reaction to me immediately.       Screening performed by Ibis Morelos MA on 9/30/2020 at 10:00 AM.

## 2020-09-30 NOTE — PROGRESS NOTES
SUBJECTIVE:   CC: Jimena Moreira is an 40 year old woman who presents for preventive health visit.       Patient has been advised of split billing requirements and indicates understanding: Yes  HPI            Today's PHQ-2 Score:   PHQ-2 (  [employer]) 2020   Q1: Little interest or pleasure in doing things 1   Q2: Feeling down, depressed or hopeless 1   PHQ-2 Score 2   Q1: Little interest or pleasure in doing things Several days   Q2: Feeling down, depressed or hopeless Several days   PHQ-2 Score 2       Abuse: Current or Past (Physical, Sexual or Emotional) - No  Do you feel safe in your environment? Yes        Social History     Tobacco Use     Smoking status: Former Smoker     Last attempt to quit: 2013     Years since quittin.2     Smokeless tobacco: Never Used     Tobacco comment: social   Substance Use Topics     Alcohol use: Yes     Comment: occ     If you drink alcohol do you typically have >3 drinks per day or >7 drinks per week? No    Alcohol Use 2020   Prescreen: >3 drinks/day or >7 drinks/week? No   Prescreen: >3 drinks/day or >7 drinks/week? -       Reviewed orders with patient.  Reviewed health maintenance and updated orders accordingly - Yes  Labs reviewed in EPIC  BP Readings from Last 3 Encounters:   20 120/84   20 118/86   19 124/88    Wt Readings from Last 3 Encounters:   20 98.1 kg (216 lb 6 oz)   20 93.2 kg (205 lb 7 oz)   19 95.5 kg (210 lb 9 oz)                  Patient Active Problem List   Diagnosis     CARDIOVASCULAR SCREENING; LDL GOAL LESS THAN 160     SLE (systemic lupus erythematosus) (H)     Sicca (H)     High risk medication use     Anxiety     Generalized anxiety disorder     Attention deficit hyperactivity disorder (ADHD)     Major depressive disorder, recurrent episode, moderate (H)     Past Surgical History:   Procedure Laterality Date     BIOPSY       CHOLECYSTECTOMY         Social History     Tobacco Use     Smoking  status: Former Smoker     Last attempt to quit: 2013     Years since quittin.2     Smokeless tobacco: Never Used     Tobacco comment: social   Substance Use Topics     Alcohol use: Yes     Comment: occ     Family History   Problem Relation Age of Onset     Hypertension Father      Breast Cancer Mother      Other Cancer Maternal Grandfather 87        Lymphoma     Diabetes Maternal Grandfather      Cancer Paternal Aunt      Cardiovascular Paternal Uncle         CHF,  in sleep     Hypertension Brother         hypertension, Etoh use     Hyperlipidemia Brother      Breast Cancer Paternal Grandmother      Colon Cancer No family hx of          Current Outpatient Medications   Medication Sig Dispense Refill     ALPRAZolam (XANAX) 0.5 MG tablet Take 1 tablet (0.5 mg) by mouth as needed for anxiety 30 tablet 0     amphetamine-dextroamphetamine (ADDERALL XR) 30 MG 24 hr capsule Take 1 capsule (30 mg) by mouth daily 30 capsule 0     [START ON 10/31/2020] amphetamine-dextroamphetamine (ADDERALL XR) 30 MG 24 hr capsule Take 1 capsule (30 mg) by mouth daily 30 capsule 0     [START ON 2020] amphetamine-dextroamphetamine (ADDERALL XR) 30 MG 24 hr capsule Take 1 capsule (30 mg) by mouth daily 30 capsule 0     hydroxychloroquine (PLAQUENIL) 200 MG tablet Take 2 tablets (400 mg) by mouth once daily. 180 tablet 4     ibuprofen (ADVIL,MOTRIN) 800 MG tablet Take 1 tablet (800 mg) by mouth every 8 hours as needed for pain 90 tablet 1     omeprazole (PRILOSEC) 20 MG DR capsule Take 1 capsule (20 mg) by mouth once daily. Take 30-60 minutes before a meal. 90 capsule 4     triamcinolone (KENALOG) 0.1 % external cream Apply sparingly to affected area two times daily as needed 453.6 g 0     venlafaxine (EFFEXOR-XR) 75 MG 24 hr capsule Take 3 capsules (225 mg) by mouth once daily. 270 capsule 4     Allergies   Allergen Reactions     Nickel Chloride  [Nickel] Dermatitis     No Clinical Screening - See Comments Dermatitis      Zoloft [Sertraline] Anxiety     Cliches jaw, Increased anxiety     Recent Labs   Lab Test 09/23/19  1118 08/08/16  0844 01/14/15  1450  09/10/14  1106  05/08/13  1050   LDL  --   --   --   --  63  --  69   HDL  --   --   --   --  80  --   --    TRIG  --   --   --   --  63  --   --    ALT 34 17 18   < >  --    < >  --    CR 0.74 0.70 1.00   < >  --    < >  --    GFRESTIMATED >90 >90  Non  GFR Calc   64   < >  --    < >  --    GFRESTBLACK >90 >90   GFR Calc   77   < >  --    < >  --    POTASSIUM 4.0 4.5 3.8   < >  --    < >  --    TSH 2.66  --  0.76  --   --    < > 1.39    < > = values in this interval not displayed.        Mammogram Screening: Patient under age 50, mutual decision reflected in health maintenance.      Pertinent mammograms are reviewed under the imaging tab.  History of abnormal Pap smear: NO - age 30-65 PAP every 5 years with negative HPV co-testing recommended  PAP / HPV 9/10/2014 9/4/2013   PAP OTHER-NIL, See Result UNSAT     Reviewed and updated as needed this visit by clinical staff  Tobacco  Allergies  Meds  Problems  Med Hx  Surg Hx  Fam Hx  Soc Hx          Reviewed and updated as needed this visit by Provider  Tobacco  Allergies  Meds  Problems  Med Hx  Surg Hx  Fam Hx            Review of Systems   Constitutional: Negative for chills and fever.   HENT: Positive for congestion. Negative for ear pain, hearing loss and sore throat.    Eyes: Negative for pain and visual disturbance.   Respiratory: Negative for cough and shortness of breath.    Cardiovascular: Negative for chest pain, palpitations and peripheral edema.   Gastrointestinal: Positive for diarrhea and heartburn. Negative for abdominal pain, constipation, hematochezia and nausea.   Breasts:  Positive for discharge. Negative for tenderness and breast mass.   Genitourinary: Positive for vaginal bleeding. Negative for dysuria, frequency, genital sores, hematuria, urgency and vaginal discharge.    Musculoskeletal: Positive for arthralgias and myalgias.   Skin: Positive for rash.   Neurological: Positive for dizziness, weakness and headaches.   Psychiatric/Behavioral: Positive for mood changes. The patient is nervous/anxious.           OBJECTIVE:   /84 (BP Location: Right arm, Patient Position: Chair, Cuff Size: Adult Large)   Pulse 104   Temp 98.3  F (36.8  C) (Temporal)   Resp 18   Wt 98.1 kg (216 lb 6 oz)   LMP 09/26/2020   SpO2 97%   BMI 36.91 kg/m    Physical Exam  GENERAL: healthy, alert, no distress and obese  EYES: Eyes grossly normal to inspection, PERRL and conjunctivae and sclerae normal  HENT: ear canals and TM's normal, nose and mouth without ulcers or lesions  NECK: no adenopathy, no asymmetry, masses, or scars and thyroid normal to palpation  RESP: lungs clear to auscultation - no rales, rhonchi or wheezes  BREAST: normal without masses, tenderness or nipple discharge and no palpable axillary masses or adenopathy  CV: regular rate and rhythm, normal S1 S2, no S3 or S4, no murmur, click or rub, no peripheral edema and peripheral pulses strong  ABDOMEN: soft, nontender, no hepatosplenomegaly, no masses and bowel sounds normal  MS: no gross musculoskeletal defects noted, no edema  SKIN: no suspicious lesions or rashes  NEURO: Normal strength and tone, mentation intact and speech normal  PSYCH: mentation appears normal, affect normal/bright  LYMPH: no cervical, supraclavicular, axillary, or inguinal adenopathy    Diagnostic Test Results:  Labs reviewed in Epic    ASSESSMENT/PLAN:       ICD-10-CM    1. Routine general medical examination at a health care facility  Z00.00    2. Anxiety  F41.9 venlafaxine (EFFEXOR-XR) 75 MG 24 hr capsule   3. Systemic lupus erythematosus, unspecified SLE type, unspecified organ involvement status (H)  M32.9 hydroxychloroquine (PLAQUENIL) 200 MG tablet     CBC with platelets     Comprehensive metabolic panel (BMP + Alb, Alk Phos, ALT, AST, Total. Bili,  "TP)   4. Gastroesophageal reflux disease without esophagitis  K21.9 omeprazole (PRILOSEC) 20 MG DR capsule   5. Attention deficit hyperactivity disorder (ADHD), combined type  F90.2 amphetamine-dextroamphetamine (ADDERALL XR) 30 MG 24 hr capsule     amphetamine-dextroamphetamine (ADDERALL XR) 30 MG 24 hr capsule     amphetamine-dextroamphetamine (ADDERALL XR) 30 MG 24 hr capsule   6. Need for vaccination  Z23    7. Need for prophylactic vaccination and inoculation against influenza  Z23 TD PRSERV FREE >=7 YRS ADS IM [53275]     1st  Administration  [33199]   8. Encounter for screening mammogram for breast cancer  Z12.31 *MA Screening Digital Bilateral       Patient has been advised of split billing requirements and indicates understanding: Yes  COUNSELING:  Reviewed preventive health counseling, as reflected in patient instructions       Regular exercise       Healthy diet/nutrition       Vision screening       Immunizations    Vaccinated for: Influenza will be completed through work at [employer]. and [employer]             Folic Acid Counseling       HIV screeninx in teen years, 1x in adult years, completed in .  and at intervals if high risk       Advance Care Planning    Estimated body mass index is 36.91 kg/m  as calculated from the following:    Height as of 20: 1.631 m (5' 4.2\").    Weight as of this encounter: 98.1 kg (216 lb 6 oz).    Weight management plan: Discussed healthy diet and exercise guidelines    She reports that she quit smoking about 7 years ago. She has never used smokeless tobacco.      Counseling Resources:  ATP IV Guidelines  Pooled Cohorts Equation Calculator  Breast Cancer Risk Calculator  BRCA-Related Cancer Risk Assessment: FHS-7 Tool  FRAX Risk Assessment  ICSI Preventive Guidelines  Dietary Guidelines for Americans, 2010  USDA's MyPlate  ASA Prophylaxis  Lung CA Screening    Richard Small MD  [employer]  "

## 2020-10-09 ENCOUNTER — VIRTUAL VISIT (OUTPATIENT)
Dept: FAMILY MEDICINE | Facility: CLINIC | Age: 40
End: 2020-10-09
Payer: COMMERCIAL

## 2020-10-09 DIAGNOSIS — R52 BODY ACHES: Primary | ICD-10-CM

## 2020-10-09 PROCEDURE — 99213 OFFICE O/P EST LOW 20 MIN: CPT | Mod: 95 | Performed by: NURSE PRACTITIONER

## 2020-10-09 NOTE — PROGRESS NOTES
"Jimena Moreira is a 40 year old female who is being evaluated via a billable video visit.      The patient has been notified of following:     \"This video visit will be conducted via a call between you and your physician/provider. We have found that certain health care needs can be provided without the need for an in-person physical exam.  This service lets us provide the care you need with a video conversation.  If a prescription is necessary we can send it directly to your pharmacy.  If lab work is needed we can place an order for that and you can then stop by our lab to have the test done at a later time.    Video visits are billed at different rates depending on your insurance coverage.  Please reach out to your insurance provider with any questions.    If during the course of the call the physician/provider feels a video visit is not appropriate, you will not be charged for this service.\"    Patient has given verbal consent for Video visit? Yes  How would you like to obtain your AVS? MyChart  If you are dropped from the video visit, the video invite should be resent to: Text to cell phone: [phone]  Will anyone else be joining your video visit? No    Subjective     Jimena Moreira is a 40 year old female who presents today via video visit for the following health issues:    HPI     Patient presents with:  Patient Request for Note/Letter: Work Note       Patient was in clinic last week and got a tetanus shot.  After that she developed arm pain, body aches and fatigue. Slight cough.  No fevers.  She was required to get a COVID test by her work.  That was done Monday and was negative.  She states nearly all her symptoms have resolved.  However, her work is requiring she be seen prior to going back to work.  She has lupus and feels she is back at her baseline state.  No other concerns today.            Video Start Time: 10:54 AM        Review of Systems   Constitutional, HEENT, cardiovascular, pulmonary, gi " and gu systems are negative, except as otherwise noted.      Objective           Vitals:  No vitals were obtained today due to virtual visit.    Physical Exam     GENERAL: Healthy, alert and no distress  EYES: Eyes grossly normal to inspection.  No discharge or erythema, or obvious scleral/conjunctival abnormalities.  RESP: No audible wheeze, cough, or visible cyanosis.  No visible retractions or increased work of breathing.    SKIN: Visible skin clear. No significant rash, abnormal pigmentation or lesions.  NEURO: Cranial nerves grossly intact.  Mentation and speech appropriate for age.  PSYCH: Mentation appears normal, affect normal/bright, judgement and insight intact, normal speech and appearance well-groomed.              Assessment & Plan     Body aches  Likely related to her recent Tetanus vaccine and underlying Lupus.  She has had a negative COVID test and never had classic symptoms so I feel she is fine to return to work.  Letter given. Follow up as needed.           See Patient Instructions    No follow-ups on file.    ED Alston CNP  [facility]      Video-Visit Details    Type of service:  Video Visit    Video End Time:11:01 AM    Originating Location (pt. Location): Home    Distant Location (provider location):  [facility]     Platform used for Video Visit: Avril

## 2020-10-09 NOTE — LETTER
[address] [address]  Phone: [phone]  Fax: [phone]    October 9, 2020        Jimena Moreira  [address] [address] [place] [id number]          To whom it may concern:    RE: Jimena Moreira    This patient was evaluated through our clinic today and she is cleared to go back to work on Monday, 10/12/2020.     Please contact me for questions or concerns.      Sincerely,        ED Alston CNP

## 2020-10-21 DIAGNOSIS — Z13.6 CARDIOVASCULAR SCREENING; LDL GOAL LESS THAN 160: Primary | ICD-10-CM

## 2020-11-05 ENCOUNTER — MYC MEDICAL ADVICE (OUTPATIENT)
Dept: FAMILY MEDICINE | Facility: CLINIC | Age: 40
End: 2020-11-05

## 2020-11-05 NOTE — TELEPHONE ENCOUNTER
Reason for Call:  Form, our goal is to have forms completed with 72 hours, however, some forms may require a visit or additional information.    Type of letter, form or note:  FMLA    Who is the form from?: Patient    Where did the form come from: sent through [employer]     What clinic location was the form placed at?: [facility]    Where the form was placed: Dr. Small Box/Folder    What number is listed as a contact on the form?: [phone]       Additional comments: Fax completed forms back to [facility], [phone]    Call taken on 11/5/2020 at 2:09 PM by Oanh Sepulveda LPN

## 2020-12-06 ENCOUNTER — HEALTH MAINTENANCE LETTER (OUTPATIENT)
Age: 40
End: 2020-12-06

## 2021-02-03 ENCOUNTER — MYC MEDICAL ADVICE (OUTPATIENT)
Dept: FAMILY MEDICINE | Facility: CLINIC | Age: 41
End: 2021-02-03

## 2021-02-03 DIAGNOSIS — F90.2 ATTENTION DEFICIT HYPERACTIVITY DISORDER (ADHD), COMBINED TYPE: Primary | ICD-10-CM

## 2021-02-03 RX ORDER — DEXTROAMPHETAMINE SACCHARATE, AMPHETAMINE ASPARTATE MONOHYDRATE, DEXTROAMPHETAMINE SULFATE AND AMPHETAMINE SULFATE 7.5; 7.5; 7.5; 7.5 MG/1; MG/1; MG/1; MG/1
30 CAPSULE, EXTENDED RELEASE ORAL DAILY
Qty: 30 CAPSULE | Refills: 0 | Status: SHIPPED | OUTPATIENT
Start: 2021-04-06 | End: 2021-05-06

## 2021-02-03 RX ORDER — DEXTROAMPHETAMINE SACCHARATE, AMPHETAMINE ASPARTATE MONOHYDRATE, DEXTROAMPHETAMINE SULFATE AND AMPHETAMINE SULFATE 7.5; 7.5; 7.5; 7.5 MG/1; MG/1; MG/1; MG/1
30 CAPSULE, EXTENDED RELEASE ORAL DAILY
Qty: 30 CAPSULE | Refills: 0 | Status: SHIPPED | OUTPATIENT
Start: 2021-03-06 | End: 2021-04-05

## 2021-02-03 RX ORDER — DEXTROAMPHETAMINE SACCHARATE, AMPHETAMINE ASPARTATE MONOHYDRATE, DEXTROAMPHETAMINE SULFATE AND AMPHETAMINE SULFATE 7.5; 7.5; 7.5; 7.5 MG/1; MG/1; MG/1; MG/1
30 CAPSULE, EXTENDED RELEASE ORAL DAILY
Qty: 30 CAPSULE | Refills: 0 | Status: SHIPPED | OUTPATIENT
Start: 2021-02-03 | End: 2021-03-05

## 2021-02-03 NOTE — TELEPHONE ENCOUNTER
adderall      Last Written Prescription Date:  12/01/20  Last Fill Quantity: 30,   # refills: 0  Last Office Visit: 10/09/20  Future Office visit:       Routing refill request to provider for review/approval because:  Drug not on the [facility], [facility] or [facility] refill protocol or controlled substance

## 2021-02-20 ENCOUNTER — HEALTH MAINTENANCE LETTER (OUTPATIENT)
Age: 41
End: 2021-02-20

## 2021-05-28 ENCOUNTER — MYC REFILL (OUTPATIENT)
Dept: FAMILY MEDICINE | Facility: OTHER | Age: 41
End: 2021-05-28

## 2021-05-28 ENCOUNTER — MYC MEDICAL ADVICE (OUTPATIENT)
Dept: FAMILY MEDICINE | Facility: CLINIC | Age: 41
End: 2021-05-28

## 2021-05-28 DIAGNOSIS — F90.2 ATTENTION DEFICIT HYPERACTIVITY DISORDER (ADHD), COMBINED TYPE: Primary | ICD-10-CM

## 2021-05-28 DIAGNOSIS — F41.9 ANXIETY: ICD-10-CM

## 2021-05-28 RX ORDER — DEXTROAMPHETAMINE SACCHARATE, AMPHETAMINE ASPARTATE, DEXTROAMPHETAMINE SULFATE AND AMPHETAMINE SULFATE 7.5; 7.5; 7.5; 7.5 MG/1; MG/1; MG/1; MG/1
30 TABLET ORAL DAILY
Qty: 30 TABLET | Refills: 0 | Status: SHIPPED | OUTPATIENT
Start: 2021-06-05 | End: 2021-06-04 | Stop reason: ALTCHOICE

## 2021-05-28 RX ORDER — DEXTROAMPHETAMINE SACCHARATE, AMPHETAMINE ASPARTATE, DEXTROAMPHETAMINE SULFATE AND AMPHETAMINE SULFATE 7.5; 7.5; 7.5; 7.5 MG/1; MG/1; MG/1; MG/1
30 TABLET ORAL DAILY
Qty: 30 TABLET | Refills: 0 | Status: SHIPPED | OUTPATIENT
Start: 2021-07-29 | End: 2021-06-04 | Stop reason: ALTCHOICE

## 2021-05-28 RX ORDER — ALPRAZOLAM 0.5 MG
0.5 TABLET ORAL PRN
Qty: 30 TABLET | Refills: 0 | Status: SHIPPED | OUTPATIENT
Start: 2021-05-28 | End: 2022-08-09

## 2021-05-28 RX ORDER — DEXTROAMPHETAMINE SACCHARATE, AMPHETAMINE ASPARTATE, DEXTROAMPHETAMINE SULFATE AND AMPHETAMINE SULFATE 7.5; 7.5; 7.5; 7.5 MG/1; MG/1; MG/1; MG/1
30 TABLET ORAL DAILY
Qty: 30 TABLET | Refills: 0 | Status: SHIPPED | OUTPATIENT
Start: 2021-06-28 | End: 2021-06-04 | Stop reason: ALTCHOICE

## 2021-05-28 NOTE — TELEPHONE ENCOUNTER
Alprazolam        Last Written Prescription Date:  2/5/2020  Last Fill Quantity: 30,   # refills: 0  Last Office Visit: 9/3/2020  Future Office visit:       Routing refill request to provider for review/approval because:  Drug not on the [facility], [facility] or [facility] refill protocol or controlled substance

## 2021-05-28 NOTE — TELEPHONE ENCOUNTER
Adderall last refilled 5/6/2021 #30. Last office visit was 9/30/2020. RN not sure how to T up 3 months worth of Adderall at once.     Will have PCP review.    STACEY TorresN, RN  [facility]

## 2021-06-03 ENCOUNTER — TELEPHONE (OUTPATIENT)
Dept: FAMILY MEDICINE | Facility: CLINIC | Age: 41
End: 2021-06-03

## 2021-06-03 DIAGNOSIS — F90.2 ATTENTION DEFICIT HYPERACTIVITY DISORDER (ADHD), COMBINED TYPE: Primary | ICD-10-CM

## 2021-06-03 NOTE — TELEPHONE ENCOUNTER
Spoke to pharmacist. Per Dr. Butch foster to fill 06/04/2021.     Ibis Morelos MA 6/3/2021  1:30 PM

## 2021-06-03 NOTE — TELEPHONE ENCOUNTER
Patient calling and stating she is out of her Adderall and noted the start date is not until 6/5/21. Patient stating she gets her meds mailed to her which means she will not get them till later next week. She is questioning if provider can okay pharmacy to fill by tomorrow so the pharmacy can mail so she can receive them hopefully by Monday or Tuesday. Please advise. Oanh Sepulveda LPN

## 2021-06-04 RX ORDER — DEXTROAMPHETAMINE SACCHARATE, AMPHETAMINE ASPARTATE MONOHYDRATE, DEXTROAMPHETAMINE SULFATE AND AMPHETAMINE SULFATE 7.5; 7.5; 7.5; 7.5 MG/1; MG/1; MG/1; MG/1
30 CAPSULE, EXTENDED RELEASE ORAL DAILY
Qty: 30 CAPSULE | Refills: 0 | Status: SHIPPED | OUTPATIENT
Start: 2021-07-05 | End: 2021-08-04

## 2021-06-04 RX ORDER — DEXTROAMPHETAMINE SACCHARATE, AMPHETAMINE ASPARTATE MONOHYDRATE, DEXTROAMPHETAMINE SULFATE AND AMPHETAMINE SULFATE 7.5; 7.5; 7.5; 7.5 MG/1; MG/1; MG/1; MG/1
30 CAPSULE, EXTENDED RELEASE ORAL DAILY
Qty: 30 CAPSULE | Refills: 0 | Status: SHIPPED | OUTPATIENT
Start: 2021-06-04 | End: 2021-07-04

## 2021-06-04 RX ORDER — DEXTROAMPHETAMINE SACCHARATE, AMPHETAMINE ASPARTATE MONOHYDRATE, DEXTROAMPHETAMINE SULFATE AND AMPHETAMINE SULFATE 7.5; 7.5; 7.5; 7.5 MG/1; MG/1; MG/1; MG/1
30 CAPSULE, EXTENDED RELEASE ORAL DAILY
Qty: 30 CAPSULE | Refills: 0 | Status: SHIPPED | OUTPATIENT
Start: 2021-08-05 | End: 2021-09-04

## 2021-06-04 NOTE — TELEPHONE ENCOUNTER
Pharmacist called this AM regarding pt's Adderall Rxs.   Patient's previous Rx of Adderall XR, 30mg cap, 1 cap daily. Current Rx listed is for Adderall 30mg tablet, 1 tab daily.      Pharmacy wants clarification on if this is to be a capsule or tablet, and if the Rx should be XR or regular release.      Routing to Dr. Small & team to clarify. Patient is out of Rx and is wanting to  this AM from pharmacy.       Shelby Hernandze RN   06/04/21 8:26 AM  [facility] Nurse Advisor

## 2021-06-17 ENCOUNTER — HOSPITAL ENCOUNTER (OUTPATIENT)
Dept: MAMMOGRAPHY | Facility: CLINIC | Age: 41
Discharge: HOME | End: 2021-06-17
Attending: FAMILY MEDICINE | Admitting: FAMILY MEDICINE
Payer: COMMERCIAL

## 2021-06-17 ENCOUNTER — OFFICE VISIT (OUTPATIENT)
Dept: FAMILY MEDICINE | Facility: CLINIC | Age: 41
End: 2021-06-17
Payer: COMMERCIAL

## 2021-06-17 VITALS
HEART RATE: 90 BPM | SYSTOLIC BLOOD PRESSURE: 128 MMHG | BODY MASS INDEX: 35.65 KG/M2 | DIASTOLIC BLOOD PRESSURE: 78 MMHG | WEIGHT: 214 LBS | TEMPERATURE: 96.5 F | HEIGHT: 65 IN | OXYGEN SATURATION: 96 % | RESPIRATION RATE: 18 BRPM

## 2021-06-17 DIAGNOSIS — M32.9 SYSTEMIC LUPUS ERYTHEMATOSUS, UNSPECIFIED SLE TYPE, UNSPECIFIED ORGAN INVOLVEMENT STATUS (H): ICD-10-CM

## 2021-06-17 DIAGNOSIS — F33.1 MAJOR DEPRESSIVE DISORDER, RECURRENT EPISODE, MODERATE (H): ICD-10-CM

## 2021-06-17 DIAGNOSIS — Z12.31 ENCOUNTER FOR SCREENING MAMMOGRAM FOR BREAST CANCER: ICD-10-CM

## 2021-06-17 PROCEDURE — 99213 OFFICE O/P EST LOW 20 MIN: CPT | Performed by: FAMILY MEDICINE

## 2021-06-17 PROCEDURE — 77063 BREAST TOMOSYNTHESIS BI: CPT

## 2021-06-17 RX ORDER — CETIRIZINE HYDROCHLORIDE 10 MG/1
10 TABLET ORAL DAILY
COMMUNITY

## 2021-06-17 ASSESSMENT — ANXIETY QUESTIONNAIRES
2. NOT BEING ABLE TO STOP OR CONTROL WORRYING: SEVERAL DAYS
GAD7 TOTAL SCORE: 5
1. FEELING NERVOUS, ANXIOUS, OR ON EDGE: SEVERAL DAYS
IF YOU CHECKED OFF ANY PROBLEMS ON THIS QUESTIONNAIRE, HOW DIFFICULT HAVE THESE PROBLEMS MADE IT FOR YOU TO DO YOUR WORK, TAKE CARE OF THINGS AT HOME, OR GET ALONG WITH OTHER PEOPLE: SOMEWHAT DIFFICULT
5. BEING SO RESTLESS THAT IT IS HARD TO SIT STILL: NOT AT ALL
7. FEELING AFRAID AS IF SOMETHING AWFUL MIGHT HAPPEN: NOT AT ALL
6. BECOMING EASILY ANNOYED OR IRRITABLE: SEVERAL DAYS
3. WORRYING TOO MUCH ABOUT DIFFERENT THINGS: MORE THAN HALF THE DAYS

## 2021-06-17 ASSESSMENT — PATIENT HEALTH QUESTIONNAIRE - PHQ9
5. POOR APPETITE OR OVEREATING: NOT AT ALL
SUM OF ALL RESPONSES TO PHQ QUESTIONS 1-9: 5

## 2021-06-17 ASSESSMENT — PAIN SCALES - GENERAL: PAINLEVEL: NO PAIN (0)

## 2021-06-17 ASSESSMENT — MIFFLIN-ST. JEOR: SCORE: 1641.58

## 2021-06-17 NOTE — PROGRESS NOTES
"    Assessment & Plan     Systemic lupus erythematosus, unspecified SLE type, unspecified organ involvement status (H)  Chronic, stable on Plaquenil. She follows with Rheumatology.    Major depressive disorder, recurrent episode, moderate (H)  Chronic stable. Doing well on Effexor. Increased stress now as SO is having hip replacement surgery. The current medical regimen is effective;  continue present plan and medications.          BMI:   Estimated body mass index is 35.61 kg/m  as calculated from the following:    Height as of this encounter: 1.651 m (5' 5\").    Weight as of this encounter: 97.1 kg (214 lb).   Weight management plan: Discussed healthy diet and exercise guidelines    SELF MONITORING:       - Please check blood pressure readings daily  Work on weight loss  Regular exercise    Return in about 6 months (around 2021) for Follow up, with me, in person.    Richard Small MD  [facility] NEREIDA Hess is a 41 year old who presents for the following health issues     HPI     Depression and Anxiety Follow-Up    How are you doing with your depression since your last visit? Improved     How are you doing with your anxiety since your last visit?  No change    Are you having other symptoms that might be associated with depression or anxiety? No    Have you had a significant life event? OTHER: significant other having surgeries      Do you have any concerns with your use of alcohol or other drugs? No    Social History     Tobacco Use     Smoking status: Former Smoker     Quit date: 2013     Years since quittin.0     Smokeless tobacco: Never Used     Tobacco comment: social   Substance Use Topics     Alcohol use: Yes     Comment: occ     Drug use: No     PHQ 2020   PHQ-9 Total Score 8 5 5   Q9: Thoughts of better off dead/self-harm past 2 weeks Not at all Not at all Not at all     JET-7 SCORE 2019   Total Score - - - "   Total Score 9 6 5     Last PHQ-9 6/17/2021   1.  Little interest or pleasure in doing things 0   2.  Feeling down, depressed, or hopeless 0   3.  Trouble falling or staying asleep, or sleeping too much 1   4.  Feeling tired or having little energy 1   5.  Poor appetite or overeating 0   6.  Feeling bad about yourself 2   7.  Trouble concentrating 1   8.  Moving slowly or restless 0   Q9: Thoughts of better off dead/self-harm past 2 weeks 0   PHQ-9 Total Score 5   Difficulty at work, home, or with people Not difficult at all     JET-7  6/17/2021   1. Feeling nervous, anxious, or on edge 1   2. Not being able to stop or control worrying 1   3. Worrying too much about different things 2   4. Trouble relaxing 0   5. Being so restless that it is hard to sit still 0   6. Becoming easily annoyed or irritable 1   7. Feeling afraid, as if something awful might happen 0   JET-7 Total Score 5   If you checked any problems, how difficult have they made it for you to do your work, take care of things at home, or get along with other people? Somewhat difficult       Suicide Assessment Five-step Evaluation and Treatment (SAFE-T)      How many servings of fruits and vegetables do you eat daily?  2-3    On average, how many sweetened beverages do you drink each day (Examples: soda, juice, sweet tea, etc.  Do NOT count diet or artificially sweetened beverages)?   0    How many days per week do you exercise enough to make your heart beat faster? walking    How many minutes a day do you exercise enough to make your heart beat faster? 30 - 60    How many days per week do you miss taking your medication? 0    Patient Active Problem List   Diagnosis     CARDIOVASCULAR SCREENING; LDL GOAL LESS THAN 160     SLE (systemic lupus erythematosus) (H)     Sicca (H)     High risk medication use     Anxiety     Generalized anxiety disorder     Attention deficit hyperactivity disorder (ADHD)     Major depressive disorder, recurrent episode,  "moderate (H)     Current Outpatient Medications   Medication Sig Dispense Refill     amphetamine-dextroamphetamine (ADDERALL XR) 30 MG 24 hr capsule Take 1 capsule (30 mg) by mouth daily 30 capsule 0     [START ON 7/5/2021] amphetamine-dextroamphetamine (ADDERALL XR) 30 MG 24 hr capsule Take 1 capsule (30 mg) by mouth daily 30 capsule 0     [START ON 8/5/2021] amphetamine-dextroamphetamine (ADDERALL XR) 30 MG 24 hr capsule Take 1 capsule (30 mg) by mouth daily 30 capsule 0     cetirizine (ZYRTEC) 10 MG tablet Take 10 mg by mouth daily       hydroxychloroquine (PLAQUENIL) 200 MG tablet Take 2 tablets (400 mg) by mouth once daily. 180 tablet 4     ibuprofen (ADVIL,MOTRIN) 800 MG tablet Take 1 tablet (800 mg) by mouth every 8 hours as needed for pain 90 tablet 1     omeprazole (PRILOSEC) 20 MG DR capsule Take 1 capsule (20 mg) by mouth once daily. Take 30-60 minutes before a meal. 90 capsule 4     triamcinolone (KENALOG) 0.1 % external cream Apply sparingly to affected area two times daily as needed 453.6 g 0     venlafaxine (EFFEXOR-XR) 75 MG 24 hr capsule Take 3 capsules (225 mg) by mouth once daily. 270 capsule 4     ALPRAZolam (XANAX) 0.5 MG tablet Take 1 tablet (0.5 mg) by mouth as needed for anxiety (Patient not taking: Reported on 6/17/2021) 30 tablet 0         Review of Systems   CONSTITUTIONAL: NEGATIVE for fever, chills, change in weight  ENT/MOUTH: NEGATIVE for ear, mouth and throat problems  RESP: NEGATIVE for significant cough or SOB  CV: NEGATIVE for chest pain, palpitations or peripheral edema  PSYCHIATRIC: POSITIVE forHx anxiety, Hx depression, stress and history of ADHD  ROS otherwise negative      Objective    /78   Pulse 90   Temp 96.5  F (35.8  C) (Temporal)   Resp 18   Ht 1.651 m (5' 5\")   Wt 97.1 kg (214 lb)   LMP 06/09/2021   SpO2 96%   BMI 35.61 kg/m    Body mass index is 35.61 kg/m .  Physical Exam   GENERAL: healthy, alert, no distress and over weight  NECK: no adenopathy, no " asymmetry, masses, or scars and thyroid normal to palpation  RESP: lungs clear to auscultation - no rales, rhonchi or wheezes  CV: regular rate and rhythm, normal S1 S2, no S3 or S4, no murmur, click or rub, no peripheral edema and peripheral pulses strong  PSYCH: mentation appears normal, affect normal/bright    Office Visit on 09/30/2020   Component Date Value Ref Range Status     WBC 09/30/2020 8.7  4.0 - 11.0 10e9/L Final     RBC Count 09/30/2020 4.80  3.8 - 5.2 10e12/L Final     Hemoglobin 09/30/2020 14.1  11.7 - 15.7 g/dL Final     Hematocrit 09/30/2020 42.8  35.0 - 47.0 % Final     MCV 09/30/2020 89  78 - 100 fl Final     MCH 09/30/2020 29.4  26.5 - 33.0 pg Final     MCHC 09/30/2020 32.9  31.5 - 36.5 g/dL Final     RDW 09/30/2020 13.6  10.0 - 15.0 % Final     Platelet Count 09/30/2020 407  150 - 450 10e9/L Final     Sodium 09/30/2020 138  133 - 144 mmol/L Final     Potassium 09/30/2020 4.1  3.4 - 5.3 mmol/L Final     Chloride 09/30/2020 104  94 - 109 mmol/L Final     Carbon Dioxide 09/30/2020 29  20 - 32 mmol/L Final     Anion Gap 09/30/2020 5  3 - 14 mmol/L Final     Glucose 09/30/2020 92  70 - 99 mg/dL Final     Urea Nitrogen 09/30/2020 11  7 - 30 mg/dL Final     Creatinine 09/30/2020 0.75  0.52 - 1.04 mg/dL Final     GFR Estimate 09/30/2020 >90  >60 mL/min/[1.73_m2] Final    Comment: Non  GFR Calc  Starting 12/18/2018, serum creatinine based estimated GFR (eGFR) will be   calculated using the Chronic Kidney Disease Epidemiology Collaboration   (CKD-EPI) equation.       GFR Estimate If Black 09/30/2020 >90  >60 mL/min/[1.73_m2] Final    Comment:  GFR Calc  Starting 12/18/2018, serum creatinine based estimated GFR (eGFR) will be   calculated using the Chronic Kidney Disease Epidemiology Collaboration   (CKD-EPI) equation.       Calcium 09/30/2020 8.9  8.5 - 10.1 mg/dL Final     Bilirubin Total 09/30/2020 0.2  0.2 - 1.3 mg/dL Final     Albumin 09/30/2020 3.9  3.4 - 5.0 g/dL  Final     Protein Total 09/30/2020 8.2  6.8 - 8.8 g/dL Final     Alkaline Phosphatase 09/30/2020 97  40 - 150 U/L Final     ALT 09/30/2020 30  0 - 50 U/L Final     AST 09/30/2020 16  0 - 45 U/L Final     Ma Screen Bilateral W/wendy    Result Date: 6/17/2021  MA SCREENING BILATERAL WITH TOMOSYNTHESIS With CAD 6/17/2021 3:26 PM BREAST SYMPTOMS: No current breast complaints. Family history of breast cancer in her mother at age 41 and in her paternal grandmother. COMPARISON:  11/19/2018 3d, 9/21/2017, 9/17/2014. BREAST DENSITY: Heterogeneously dense. Limiting mammographic sensitivity. COMMENTS: No findings of suspicion for malignancy. Focal asymmetry in the posterior outer central left breast (approximate 4 o'clock position, 10 cm from the nipple), may be slightly more prominent than on prior study. Further evaluation with ultrasound is recommended. No other mammographic findings of concern for malignancy.     IMPRESSION: BI-RADS CATEGORY: 0 - Incomplete -Need Additional Imaging Evaluation and/or Prior Mammograms for Comparison. Focal asymmetry outer slightly lower posterior left breast. Ultrasound recommended. RECOMMENDED FOLLOW-UP: Ultrasound, left breast. Exam results letter mailed to patient.

## 2021-06-18 ASSESSMENT — ANXIETY QUESTIONNAIRES: GAD7 TOTAL SCORE: 5

## 2021-07-01 ENCOUNTER — HOSPITAL ENCOUNTER (OUTPATIENT)
Dept: ULTRASOUND IMAGING | Facility: CLINIC | Age: 41
End: 2021-07-01
Attending: FAMILY MEDICINE
Payer: COMMERCIAL

## 2021-07-01 ENCOUNTER — HOSPITAL ENCOUNTER (OUTPATIENT)
Dept: MAMMOGRAPHY | Facility: CLINIC | Age: 41
End: 2021-07-01
Attending: FAMILY MEDICINE
Payer: COMMERCIAL

## 2021-07-01 DIAGNOSIS — R92.8 ABNORMAL MAMMOGRAM: ICD-10-CM

## 2021-07-01 PROCEDURE — 999N000065 MA POST PROCEDURE LEFT

## 2021-07-01 PROCEDURE — 88305 TISSUE EXAM BY PATHOLOGIST: CPT | Mod: TC | Performed by: FAMILY MEDICINE

## 2021-07-01 PROCEDURE — 272N000431 US BREAST BIOPSY CORE NEEDLE LEFT

## 2021-07-01 PROCEDURE — 76642 ULTRASOUND BREAST LIMITED: CPT | Mod: LT

## 2021-07-01 PROCEDURE — 250N000009 HC RX 250: Performed by: RADIOLOGY

## 2021-07-01 PROCEDURE — 88305 TISSUE EXAM BY PATHOLOGIST: CPT | Mod: 26 | Performed by: PATHOLOGY

## 2021-07-01 RX ORDER — LIDOCAINE HYDROCHLORIDE 10 MG/ML
10 INJECTION, SOLUTION EPIDURAL; INFILTRATION; INTRACAUDAL; PERINEURAL ONCE
Status: COMPLETED | OUTPATIENT
Start: 2021-07-01 | End: 2021-07-01

## 2021-07-01 RX ADMIN — LIDOCAINE HYDROCHLORIDE 8 ML: 10 INJECTION, SOLUTION EPIDURAL; INFILTRATION; INTRACAUDAL; PERINEURAL at 14:25

## 2021-07-05 LAB — COPATH REPORT: NORMAL

## 2021-07-07 ENCOUNTER — TELEPHONE (OUTPATIENT)
Dept: MAMMOGRAPHY | Facility: CLINIC | Age: 41
End: 2021-07-07

## 2021-07-29 ENCOUNTER — MYC MEDICAL ADVICE (OUTPATIENT)
Dept: FAMILY MEDICINE | Facility: CLINIC | Age: 41
End: 2021-07-29

## 2021-09-03 ENCOUNTER — MYC REFILL (OUTPATIENT)
Dept: FAMILY MEDICINE | Facility: CLINIC | Age: 41
End: 2021-09-03

## 2021-09-03 DIAGNOSIS — F90.2 ATTENTION DEFICIT HYPERACTIVITY DISORDER (ADHD), COMBINED TYPE: ICD-10-CM

## 2021-09-03 RX ORDER — DEXTROAMPHETAMINE SACCHARATE, AMPHETAMINE ASPARTATE MONOHYDRATE, DEXTROAMPHETAMINE SULFATE AND AMPHETAMINE SULFATE 7.5; 7.5; 7.5; 7.5 MG/1; MG/1; MG/1; MG/1
30 CAPSULE, EXTENDED RELEASE ORAL DAILY
Qty: 30 CAPSULE | Refills: 0 | Status: CANCELLED | OUTPATIENT
Start: 2021-09-03

## 2021-09-03 NOTE — TELEPHONE ENCOUNTER
Adderall      Last Written Prescription Date:  8-5-2021  Last Fill Quantity: 30,   # refills: 0  Last Office Visit: 6-  Future Office visit:    Next 5 appointments (look out 90 days)    Oct 22, 2021  3:20 PM  MyChart Physical Adult with Richard Small MD  [facility] ([facility] ) [address] [place] [id number] [phone]           Routing refill request to provider for review/approval because:  Drug not on the [facility], [facility] or [facility] refill protocol or controlled substance

## 2021-09-04 RX ORDER — DEXTROAMPHETAMINE SACCHARATE, AMPHETAMINE ASPARTATE MONOHYDRATE, DEXTROAMPHETAMINE SULFATE AND AMPHETAMINE SULFATE 7.5; 7.5; 7.5; 7.5 MG/1; MG/1; MG/1; MG/1
30 CAPSULE, EXTENDED RELEASE ORAL DAILY
Qty: 30 CAPSULE | Refills: 0 | Status: SHIPPED | OUTPATIENT
Start: 2021-09-04 | End: 2021-10-04

## 2021-09-04 RX ORDER — DEXTROAMPHETAMINE SACCHARATE, AMPHETAMINE ASPARTATE MONOHYDRATE, DEXTROAMPHETAMINE SULFATE AND AMPHETAMINE SULFATE 7.5; 7.5; 7.5; 7.5 MG/1; MG/1; MG/1; MG/1
30 CAPSULE, EXTENDED RELEASE ORAL DAILY
Qty: 30 CAPSULE | Refills: 0 | Status: SHIPPED | OUTPATIENT
Start: 2021-10-05 | End: 2021-10-22

## 2021-09-04 RX ORDER — DEXTROAMPHETAMINE SACCHARATE, AMPHETAMINE ASPARTATE MONOHYDRATE, DEXTROAMPHETAMINE SULFATE AND AMPHETAMINE SULFATE 7.5; 7.5; 7.5; 7.5 MG/1; MG/1; MG/1; MG/1
30 CAPSULE, EXTENDED RELEASE ORAL DAILY
Qty: 30 CAPSULE | Refills: 0 | Status: SHIPPED | OUTPATIENT
Start: 2021-11-05 | End: 2021-12-05

## 2021-09-26 ENCOUNTER — HEALTH MAINTENANCE LETTER (OUTPATIENT)
Age: 41
End: 2021-09-26

## 2021-10-22 ENCOUNTER — OFFICE VISIT (OUTPATIENT)
Dept: FAMILY MEDICINE | Facility: CLINIC | Age: 41
End: 2021-10-22
Payer: COMMERCIAL

## 2021-10-22 VITALS
WEIGHT: 218 LBS | BODY MASS INDEX: 36.28 KG/M2 | HEART RATE: 121 BPM | SYSTOLIC BLOOD PRESSURE: 144 MMHG | OXYGEN SATURATION: 99 % | RESPIRATION RATE: 16 BRPM | TEMPERATURE: 98.5 F | DIASTOLIC BLOOD PRESSURE: 82 MMHG

## 2021-10-22 DIAGNOSIS — F41.9 ANXIETY: ICD-10-CM

## 2021-10-22 DIAGNOSIS — K21.9 GASTROESOPHAGEAL REFLUX DISEASE WITHOUT ESOPHAGITIS: ICD-10-CM

## 2021-10-22 DIAGNOSIS — M32.9 SYSTEMIC LUPUS ERYTHEMATOSUS, UNSPECIFIED SLE TYPE, UNSPECIFIED ORGAN INVOLVEMENT STATUS (H): ICD-10-CM

## 2021-10-22 DIAGNOSIS — Z00.00 ROUTINE GENERAL MEDICAL EXAMINATION AT A HEALTH CARE FACILITY: Primary | ICD-10-CM

## 2021-10-22 PROCEDURE — 99396 PREV VISIT EST AGE 40-64: CPT | Performed by: FAMILY MEDICINE

## 2021-10-22 RX ORDER — HYDROXYCHLOROQUINE SULFATE 200 MG/1
TABLET, FILM COATED ORAL
Qty: 180 TABLET | Refills: 4 | Status: SHIPPED | OUTPATIENT
Start: 2021-10-22 | End: 2022-11-03

## 2021-10-22 RX ORDER — VENLAFAXINE HYDROCHLORIDE 75 MG/1
CAPSULE, EXTENDED RELEASE ORAL
Qty: 270 CAPSULE | Refills: 4 | Status: SHIPPED | OUTPATIENT
Start: 2021-10-22 | End: 2022-11-03

## 2021-10-22 ASSESSMENT — ENCOUNTER SYMPTOMS
SORE THROAT: 0
NAUSEA: 0
HEMATURIA: 0
NERVOUS/ANXIOUS: 1
PARESTHESIAS: 1
HEADACHES: 0
COUGH: 0
HEMATOCHEZIA: 0
SHORTNESS OF BREATH: 0
DIARRHEA: 1
FEVER: 0
CONSTIPATION: 1
ABDOMINAL PAIN: 0
MYALGIAS: 1
ARTHRALGIAS: 1
WEAKNESS: 1
PALPITATIONS: 1
FREQUENCY: 0
HEARTBURN: 1
JOINT SWELLING: 1
CHILLS: 0
DYSURIA: 0
DIZZINESS: 0
EYE PAIN: 0
BREAST MASS: 0

## 2021-10-22 ASSESSMENT — ANXIETY QUESTIONNAIRES
GAD7 TOTAL SCORE: 5
2. NOT BEING ABLE TO STOP OR CONTROL WORRYING: MORE THAN HALF THE DAYS
1. FEELING NERVOUS, ANXIOUS, OR ON EDGE: SEVERAL DAYS
3. WORRYING TOO MUCH ABOUT DIFFERENT THINGS: SEVERAL DAYS
IF YOU CHECKED OFF ANY PROBLEMS ON THIS QUESTIONNAIRE, HOW DIFFICULT HAVE THESE PROBLEMS MADE IT FOR YOU TO DO YOUR WORK, TAKE CARE OF THINGS AT HOME, OR GET ALONG WITH OTHER PEOPLE: SOMEWHAT DIFFICULT
7. FEELING AFRAID AS IF SOMETHING AWFUL MIGHT HAPPEN: SEVERAL DAYS
6. BECOMING EASILY ANNOYED OR IRRITABLE: NOT AT ALL
5. BEING SO RESTLESS THAT IT IS HARD TO SIT STILL: NOT AT ALL

## 2021-10-22 ASSESSMENT — PATIENT HEALTH QUESTIONNAIRE - PHQ9
SUM OF ALL RESPONSES TO PHQ QUESTIONS 1-9: 3
5. POOR APPETITE OR OVEREATING: NOT AT ALL

## 2021-10-22 NOTE — PROGRESS NOTES
SUBJECTIVE:   CC: Jimena Moreira is an 41 year old woman who presents for preventive health visit.     Patient has been advised of split billing requirements and indicates understanding: Yes  Healthy Habits:     Getting at least 3 servings of Calcium per day:  Yes    Bi-annual eye exam:  Yes    Dental care twice a year:  Yes    Sleep apnea or symptoms of sleep apnea:  Daytime drowsiness    Diet:  Regular (no restrictions)    Frequency of exercise:  2-3 days/week    Duration of exercise:  30-45 minutes    Taking medications regularly:  Yes    Medication side effects:  None    PHQ-2 Total Score: 0    Additional concerns today:  Yes    1. Paperwork for FMLA - will [employer]  2. Rash - around trunk, itchy    Today's PHQ-2 Score:   PHQ-2 (  [employer]) 10/22/2021   Q1: Little interest or pleasure in doing things 0   Q2: Feeling down, depressed or hopeless 0   PHQ-2 Score 0   Q1: Little interest or pleasure in doing things Not at all   Q2: Feeling down, depressed or hopeless Not at all   PHQ-2 Score 0       Abuse: Current or Past (Physical, Sexual or Emotional) - No  Do you feel safe in your environment? Yes    Have you ever done Advance Care Planning? (For example, a Health Directive, POLST, or a discussion with a medical provider or your loved ones about your wishes): Yes, patient states has an Advance Care Planning document and will bring a copy to the clinic.    Social History     Tobacco Use     Smoking status: Former Smoker     Quit date: 2013     Years since quittin.3     Smokeless tobacco: Never Used     Tobacco comment: social   Substance Use Topics     Alcohol use: Yes     Comment: occ       Alcohol Use 10/22/2021   Prescreen: >3 drinks/day or >7 drinks/week? Yes   Prescreen: >3 drinks/day or >7 drinks/week? -   AUDIT SCORE  5     AUDIT - Alcohol Use Disorders Identification Test - Reproduced from the World Health Organization Audit 2001 (Second Edition) 10/22/2021   1.  How often do you have a drink  containing alcohol? 2 to 3 times a week   2.  How many drinks containing alcohol do you have on a typical day when you are drinking? 1 or 2   3.  How often do you have five or more drinks on one occasion? Monthly   4.  How often during the last year have you found that you were not able to stop drinking once you had started? Never   5.  How often during the last year have you failed to do what was normally expected of you because of drinking? Never   6.  How often during the last year have you needed a first drink in the morning to get yourself going after a heavy drinking session? Never   7.  How often during the last year have you had a feeling of guilt or remorse after drinking? Never   8.  How often during the last year have you been unable to remember what happened the night before because of your drinking? Never   9.  Have you or someone else been injured because of your drinking? No   10. Has a relative, friend, doctor or other health care worker been concerned about your drinking or suggested you cut down? No   TOTAL SCORE 5       Reviewed orders with patient.  Reviewed health maintenance and updated orders accordingly - Yes  Labs reviewed in EPIC  BP Readings from Last 3 Encounters:   10/22/21 (!) 144/82   06/17/21 128/78   09/30/20 120/84    Wt Readings from Last 3 Encounters:   10/22/21 98.9 kg (218 lb)   06/17/21 97.1 kg (214 lb)   09/30/20 98.1 kg (216 lb 6 oz)                  Patient Active Problem List   Diagnosis     CARDIOVASCULAR SCREENING; LDL GOAL LESS THAN 160     SLE (systemic lupus erythematosus) (H)     Sicca (H)     High risk medication use     Anxiety     Generalized anxiety disorder     Attention deficit hyperactivity disorder (ADHD)     Major depressive disorder, recurrent episode, moderate (H)     Past Surgical History:   Procedure Laterality Date     BIOPSY       CHOLECYSTECTOMY         Social History     Tobacco Use     Smoking status: Former Smoker     Quit date: 6/19/2013     Years  since quittin.3     Smokeless tobacco: Never Used     Tobacco comment: social   Substance Use Topics     Alcohol use: Yes     Comment: occ     Family History   Problem Relation Age of Onset     Hypertension Father      Breast Cancer Mother      Other Cancer Maternal Grandfather 87        Lymphoma     Diabetes Maternal Grandfather      Cancer Paternal Aunt      Cardiovascular Paternal Uncle         CHF,  in sleep     Hypertension Brother         hypertension, Etoh use     Hyperlipidemia Brother      Breast Cancer Paternal Grandmother      Colon Cancer No family hx of          Current Outpatient Medications   Medication Sig Dispense Refill     ALPRAZolam (XANAX) 0.5 MG tablet Take 1 tablet (0.5 mg) by mouth as needed for anxiety 30 tablet 0     [START ON 2021] amphetamine-dextroamphetamine (ADDERALL XR) 30 MG 24 hr capsule Take 1 capsule (30 mg) by mouth daily 30 capsule 0     cetirizine (ZYRTEC) 10 MG tablet Take 10 mg by mouth daily       hydroxychloroquine (PLAQUENIL) 200 MG tablet Take 2 tablets (400 mg) by mouth once daily. 180 tablet 4     ibuprofen (ADVIL,MOTRIN) 800 MG tablet Take 1 tablet (800 mg) by mouth every 8 hours as needed for pain 90 tablet 1     omeprazole (PRILOSEC) 20 MG DR capsule Take 1 capsule (20 mg) by mouth once daily. Take 30-60 minutes before a meal. 90 capsule 4     triamcinolone (KENALOG) 0.1 % external cream Apply sparingly to affected area two times daily as needed 453.6 g 0     venlafaxine (EFFEXOR-XR) 75 MG 24 hr capsule Take 3 capsules (225 mg) by mouth once daily. 270 capsule 4     Allergies   Allergen Reactions     Nickel Chloride  [Nickel] Dermatitis     No Clinical Screening - See Comments Dermatitis     Zoloft [Sertraline] Anxiety     Cliches jaw, Increased anxiety     Recent Labs   Lab Test 20  1042 19  1118 16  0844 16  0844 01/14/15  1450 01/14/15  1450 09/10/14  1107 09/10/14  1106   LDL  --   --   --   --   --   --   --  63   HDL  --    --   --   --   --   --   --  80   TRIG  --   --   --   --   --   --   --  63   ALT 30 34  --  17   < > 18   < >  --    CR 0.75 0.74   < > 0.70   < > 1.00   < >  --    GFRESTIMATED >90 >90   < > >90  Non  GFR Calc     < > 64   < >  --    GFRESTBLACK >90 >90   < > >90  African American GFR Calc     < > 77   < >  --    POTASSIUM 4.1 4.0   < > 4.5   < > 3.8   < >  --    TSH  --  2.66  --   --   --  0.76  --   --     < > = values in this interval not displayed.        Breast Cancer Screening:    FHS-7:   Breast CA Risk Assessment (FHS-7) 10/22/2021   Did any of your first-degree relatives have breast or ovarian cancer? Yes   Did any of your relatives have bilateral breast cancer? Yes   Did any man in your family have breast cancer? No   Did any woman in your family have breast and ovarian cancer? Yes   Did any woman in your family have breast cancer before age 50 y? Yes   Do you have 2 or more relatives with breast and/or ovarian cancer? Yes   Do you have 2 or more relatives with breast and/or bowel cancer? Yes       Mammogram Screening - Offered annual screening and updated Health Maintenance for mutual plan based on risk factor consideration    Pertinent mammograms are reviewed under the imaging tab.    History of abnormal Pap smear: NO - age 30-65 PAP every 5 years with negative HPV co-testing recommended  PAP / HPV 9/10/2014 9/4/2013   PAP (Historical) OTHER-NIL, See Result UNSAT     Reviewed and updated as needed this visit by clinical staff  Tobacco  Allergies  Meds  Problems  Med Hx  Surg Hx  Fam Hx          Reviewed and updated as needed this visit by Provider  Tobacco  Allergies  Meds  Problems  Med Hx  Surg Hx  Fam Hx             Review of Systems   Constitutional: Negative for chills and fever.   HENT: Negative for congestion, ear pain, hearing loss and sore throat.    Eyes: Negative for pain and visual disturbance.   Respiratory: Negative for cough and shortness of breath.     Cardiovascular: Positive for palpitations and peripheral edema. Negative for chest pain.   Gastrointestinal: Positive for constipation, diarrhea and heartburn. Negative for abdominal pain, hematochezia and nausea.   Breasts:  Positive for discharge. Negative for tenderness and breast mass.   Genitourinary: Positive for vaginal bleeding and vaginal discharge. Negative for dysuria, frequency, genital sores, hematuria, pelvic pain and urgency.   Musculoskeletal: Positive for arthralgias, joint swelling and myalgias.   Skin: Positive for rash.   Neurological: Positive for weakness and paresthesias. Negative for dizziness and headaches.   Psychiatric/Behavioral: Negative for mood changes. The patient is nervous/anxious.           OBJECTIVE:   BP (!) 144/82   Pulse (!) 121   Temp 98.5  F (36.9  C) (Temporal)   Resp 16   Wt 98.9 kg (218 lb)   SpO2 99%   BMI 36.28 kg/m    Physical Exam  GENERAL: healthy, alert, no distress and obese  EYES: Eyes grossly normal to inspection, PERRL and conjunctivae and sclerae normal  HENT: ear canals and TM's normal, nose and mouth without ulcers or lesions  NECK: no adenopathy, no asymmetry, masses, or scars and thyroid normal to palpation  RESP: lungs clear to auscultation - no rales, rhonchi or wheezes  CV: regular rate and rhythm, normal S1 S2, no S3 or S4, no murmur, click or rub, no peripheral edema and peripheral pulses strong  ABDOMEN: soft, nontender, no hepatosplenomegaly, no masses and bowel sounds normal  MS: no gross musculoskeletal defects noted, no edema  SKIN: no suspicious lesions or rashes. Scattered follicular dermatitis on trunk  NEURO: Normal strength and tone, mentation intact and speech normal  PSYCH: mentation appears normal, affect normal/bright, anxious, judgement and insight intact and appearance well groomed  LYMPH: no cervical, supraclavicular, axillary, or inguinal adenopathy    Diagnostic Test Results:  Labs reviewed in Epic    ASSESSMENT/PLAN:        "ICD-10-CM    1. Routine general medical examination at a health care facility  Z00.00 Lipid panel reflex to direct LDL Fasting   2. Anxiety  F41.9 venlafaxine (EFFEXOR-XR) 75 MG 24 hr capsule   3. Gastroesophageal reflux disease without esophagitis  K21.9 omeprazole (PRILOSEC) 20 MG DR capsule   4. Systemic lupus erythematosus, unspecified SLE type, unspecified organ involvement status (H)  M32.9 hydroxychloroquine (PLAQUENIL) 200 MG tablet       Patient has been advised of split billing requirements and indicates understanding: Yes  COUNSELING:  Reviewed preventive health counseling, as reflected in patient instructions       Regular exercise       Healthy diet/nutrition       Vision screening       Hearing screening       Immunizations    Declined: Influenza and COVID due to Concerns about side effects/safety            Estimated body mass index is 36.28 kg/m  as calculated from the following:    Height as of 6/17/21: 1.651 m (5' 5\").    Weight as of this encounter: 98.9 kg (218 lb).    Weight management plan: Discussed healthy diet and exercise guidelines    She reports that she quit smoking about 8 years ago. She has never used smokeless tobacco.      Counseling Resources:  ATP IV Guidelines  Pooled Cohorts Equation Calculator  Breast Cancer Risk Calculator  BRCA-Related Cancer Risk Assessment: FHS-7 Tool  FRAX Risk Assessment  ICSI Preventive Guidelines  Dietary Guidelines for Americans, 2010  USDA's MyPlate  ASA Prophylaxis  Lung CA Screening    Richard Small MD  [facility]  "

## 2021-10-23 ASSESSMENT — ANXIETY QUESTIONNAIRES: GAD7 TOTAL SCORE: 5

## 2021-10-24 ENCOUNTER — MYC MEDICAL ADVICE (OUTPATIENT)
Dept: FAMILY MEDICINE | Facility: CLINIC | Age: 41
End: 2021-10-24

## 2022-01-03 ENCOUNTER — MYC MEDICAL ADVICE (OUTPATIENT)
Dept: FAMILY MEDICINE | Facility: CLINIC | Age: 42
End: 2022-01-03
Payer: COMMERCIAL

## 2022-01-03 DIAGNOSIS — F90.2 ATTENTION DEFICIT HYPERACTIVITY DISORDER (ADHD), COMBINED TYPE: ICD-10-CM

## 2022-01-03 RX ORDER — DEXTROAMPHETAMINE SACCHARATE, AMPHETAMINE ASPARTATE MONOHYDRATE, DEXTROAMPHETAMINE SULFATE AND AMPHETAMINE SULFATE 7.5; 7.5; 7.5; 7.5 MG/1; MG/1; MG/1; MG/1
30 CAPSULE, EXTENDED RELEASE ORAL DAILY
Qty: 30 CAPSULE | Refills: 0 | Status: SHIPPED | OUTPATIENT
Start: 2022-03-06 | End: 2022-04-05

## 2022-01-03 RX ORDER — DEXTROAMPHETAMINE SACCHARATE, AMPHETAMINE ASPARTATE MONOHYDRATE, DEXTROAMPHETAMINE SULFATE AND AMPHETAMINE SULFATE 7.5; 7.5; 7.5; 7.5 MG/1; MG/1; MG/1; MG/1
30 CAPSULE, EXTENDED RELEASE ORAL DAILY
Qty: 30 CAPSULE | Refills: 0 | Status: CANCELLED | OUTPATIENT
Start: 2022-01-03

## 2022-01-03 RX ORDER — DEXTROAMPHETAMINE SACCHARATE, AMPHETAMINE ASPARTATE MONOHYDRATE, DEXTROAMPHETAMINE SULFATE AND AMPHETAMINE SULFATE 7.5; 7.5; 7.5; 7.5 MG/1; MG/1; MG/1; MG/1
30 CAPSULE, EXTENDED RELEASE ORAL DAILY
Qty: 30 CAPSULE | Refills: 0 | Status: SHIPPED | OUTPATIENT
Start: 2022-02-03 | End: 2022-03-05

## 2022-01-03 RX ORDER — DEXTROAMPHETAMINE SACCHARATE, AMPHETAMINE ASPARTATE MONOHYDRATE, DEXTROAMPHETAMINE SULFATE AND AMPHETAMINE SULFATE 7.5; 7.5; 7.5; 7.5 MG/1; MG/1; MG/1; MG/1
30 CAPSULE, EXTENDED RELEASE ORAL DAILY
Qty: 30 CAPSULE | Refills: 0 | Status: SHIPPED | OUTPATIENT
Start: 2022-01-03 | End: 2022-02-02

## 2022-01-03 NOTE — TELEPHONE ENCOUNTER
adderall      Last Written Prescription Date:  11/05/2021  Last Fill Quantity: 30,   # refills: 0  Last Office Visit: 10/22/21  Future Office visit:       Routing refill request to provider for review/approval because:

## 2022-05-03 ENCOUNTER — MYC MEDICAL ADVICE (OUTPATIENT)
Dept: FAMILY MEDICINE | Facility: CLINIC | Age: 42
End: 2022-05-03
Payer: COMMERCIAL

## 2022-05-03 DIAGNOSIS — F90.2 ATTENTION DEFICIT HYPERACTIVITY DISORDER (ADHD), COMBINED TYPE: ICD-10-CM

## 2022-05-04 RX ORDER — DEXTROAMPHETAMINE SACCHARATE, AMPHETAMINE ASPARTATE MONOHYDRATE, DEXTROAMPHETAMINE SULFATE AND AMPHETAMINE SULFATE 7.5; 7.5; 7.5; 7.5 MG/1; MG/1; MG/1; MG/1
30 CAPSULE, EXTENDED RELEASE ORAL DAILY
Qty: 30 CAPSULE | Refills: 0 | Status: SHIPPED | OUTPATIENT
Start: 2022-07-05 | End: 2022-08-04

## 2022-05-04 RX ORDER — DEXTROAMPHETAMINE SACCHARATE, AMPHETAMINE ASPARTATE MONOHYDRATE, DEXTROAMPHETAMINE SULFATE AND AMPHETAMINE SULFATE 7.5; 7.5; 7.5; 7.5 MG/1; MG/1; MG/1; MG/1
30 CAPSULE, EXTENDED RELEASE ORAL DAILY
Qty: 30 CAPSULE | Refills: 0 | Status: SHIPPED | OUTPATIENT
Start: 2022-06-04 | End: 2022-07-04

## 2022-05-04 RX ORDER — DEXTROAMPHETAMINE SACCHARATE, AMPHETAMINE ASPARTATE MONOHYDRATE, DEXTROAMPHETAMINE SULFATE AND AMPHETAMINE SULFATE 7.5; 7.5; 7.5; 7.5 MG/1; MG/1; MG/1; MG/1
30 CAPSULE, EXTENDED RELEASE ORAL DAILY
Qty: 30 CAPSULE | Refills: 0 | Status: CANCELLED | OUTPATIENT
Start: 2022-05-04

## 2022-05-04 RX ORDER — DEXTROAMPHETAMINE SACCHARATE, AMPHETAMINE ASPARTATE MONOHYDRATE, DEXTROAMPHETAMINE SULFATE AND AMPHETAMINE SULFATE 7.5; 7.5; 7.5; 7.5 MG/1; MG/1; MG/1; MG/1
30 CAPSULE, EXTENDED RELEASE ORAL DAILY
Qty: 30 CAPSULE | Refills: 0 | Status: SHIPPED | OUTPATIENT
Start: 2022-05-04 | End: 2022-06-03

## 2022-05-04 NOTE — TELEPHONE ENCOUNTER
Adderall 30 mg      Last Written Prescription Date:  03/06/2022  Last Fill Quantity: 30,   # refills: 0  Last Office Visit: 10/22/2021  Future Office visit:       Routing refill request to provider for review/approval because:  Drug not on the [facility], [facility] or [facility] refill protocol or controlled substance

## 2022-07-14 ENCOUNTER — HOSPITAL ENCOUNTER (OUTPATIENT)
Dept: MAMMOGRAPHY | Facility: CLINIC | Age: 42
Discharge: HOME | End: 2022-07-14
Attending: FAMILY MEDICINE | Admitting: FAMILY MEDICINE
Payer: COMMERCIAL

## 2022-07-14 DIAGNOSIS — Z12.31 VISIT FOR SCREENING MAMMOGRAM: ICD-10-CM

## 2022-07-14 PROCEDURE — 77067 SCR MAMMO BI INCL CAD: CPT

## 2022-08-09 ENCOUNTER — MYC MEDICAL ADVICE (OUTPATIENT)
Dept: FAMILY MEDICINE | Facility: CLINIC | Age: 42
End: 2022-08-09

## 2022-08-09 ENCOUNTER — MYC REFILL (OUTPATIENT)
Dept: FAMILY MEDICINE | Facility: CLINIC | Age: 42
End: 2022-08-09

## 2022-08-09 DIAGNOSIS — F90.2 ATTENTION DEFICIT HYPERACTIVITY DISORDER (ADHD), COMBINED TYPE: ICD-10-CM

## 2022-08-09 DIAGNOSIS — F41.9 ANXIETY: ICD-10-CM

## 2022-08-09 RX ORDER — DEXTROAMPHETAMINE SACCHARATE, AMPHETAMINE ASPARTATE MONOHYDRATE, DEXTROAMPHETAMINE SULFATE AND AMPHETAMINE SULFATE 7.5; 7.5; 7.5; 7.5 MG/1; MG/1; MG/1; MG/1
30 CAPSULE, EXTENDED RELEASE ORAL DAILY
Qty: 30 CAPSULE | Refills: 0 | Status: CANCELLED | OUTPATIENT
Start: 2022-08-09

## 2022-08-09 RX ORDER — DEXTROAMPHETAMINE SACCHARATE, AMPHETAMINE ASPARTATE MONOHYDRATE, DEXTROAMPHETAMINE SULFATE AND AMPHETAMINE SULFATE 7.5; 7.5; 7.5; 7.5 MG/1; MG/1; MG/1; MG/1
30 CAPSULE, EXTENDED RELEASE ORAL DAILY
Qty: 30 CAPSULE | Refills: 0 | Status: SHIPPED | OUTPATIENT
Start: 2022-08-09 | End: 2022-09-08

## 2022-08-09 RX ORDER — ALPRAZOLAM 0.5 MG
0.5 TABLET ORAL PRN
Qty: 30 TABLET | Refills: 0 | Status: SHIPPED | OUTPATIENT
Start: 2022-08-09 | End: 2023-06-24

## 2022-08-09 RX ORDER — DEXTROAMPHETAMINE SACCHARATE, AMPHETAMINE ASPARTATE MONOHYDRATE, DEXTROAMPHETAMINE SULFATE AND AMPHETAMINE SULFATE 7.5; 7.5; 7.5; 7.5 MG/1; MG/1; MG/1; MG/1
30 CAPSULE, EXTENDED RELEASE ORAL DAILY
Qty: 30 CAPSULE | Refills: 0 | Status: SHIPPED | OUTPATIENT
Start: 2022-09-09 | End: 2022-10-09

## 2022-08-09 RX ORDER — DEXTROAMPHETAMINE SACCHARATE, AMPHETAMINE ASPARTATE MONOHYDRATE, DEXTROAMPHETAMINE SULFATE AND AMPHETAMINE SULFATE 7.5; 7.5; 7.5; 7.5 MG/1; MG/1; MG/1; MG/1
30 CAPSULE, EXTENDED RELEASE ORAL DAILY
Qty: 30 CAPSULE | Refills: 0 | Status: SHIPPED | OUTPATIENT
Start: 2022-10-10 | End: 2022-11-09

## 2022-08-09 NOTE — TELEPHONE ENCOUNTER
Requested Prescriptions   Pending Prescriptions Disp Refills     ALPRAZolam (XANAX) 0.5 MG tablet 30 tablet 0     Sig: Take 1 tablet (0.5 mg) by mouth as needed for anxiety     Last Written Prescription Date:  05/28/2021  Last Fill Quantity: 30,   # refills: 0  Last Office Visit: 10/22/2021  Future Office visit:       Routing refill request to provider for review/approval because:  Drug not on the [facility] or  [facility] refill protocol or controlled substance              amphetamine-dextroamphetamine (ADDERALL XR) 30 MG 24 hr capsule 30 capsule 0     Sig: Take 1 capsule (30 mg) by mouth daily     Last Written Prescription Date:  07/05/2022  Last Fill Quantity: 30,   # refills: 0  Last Office Visit: 10/22/2021  Future Office visit:       Routing refill request to provider for review/approval because:  Drug not on the [facility], [facility] or  [place] refill protocol or controlled substance     The cataracts are mild and have minimal impact on vision at this time.

## 2022-09-15 ENCOUNTER — OFFICE VISIT (OUTPATIENT)
Dept: FAMILY MEDICINE | Facility: CLINIC | Age: 42
End: 2022-09-15
Payer: COMMERCIAL

## 2022-09-15 VITALS
HEART RATE: 80 BPM | BODY MASS INDEX: 35.96 KG/M2 | DIASTOLIC BLOOD PRESSURE: 80 MMHG | WEIGHT: 215.8 LBS | SYSTOLIC BLOOD PRESSURE: 136 MMHG | TEMPERATURE: 97.6 F | RESPIRATION RATE: 16 BRPM | HEIGHT: 65 IN | OXYGEN SATURATION: 100 %

## 2022-09-15 DIAGNOSIS — F90.2 ATTENTION DEFICIT HYPERACTIVITY DISORDER (ADHD), COMBINED TYPE: Primary | ICD-10-CM

## 2022-09-15 DIAGNOSIS — M32.9 SYSTEMIC LUPUS ERYTHEMATOSUS, UNSPECIFIED SLE TYPE, UNSPECIFIED ORGAN INVOLVEMENT STATUS (H): ICD-10-CM

## 2022-09-15 DIAGNOSIS — F33.1 MAJOR DEPRESSIVE DISORDER, RECURRENT EPISODE, MODERATE (H): ICD-10-CM

## 2022-09-15 DIAGNOSIS — B02.9 HERPES ZOSTER WITHOUT COMPLICATION: ICD-10-CM

## 2022-09-15 PROCEDURE — 99214 OFFICE O/P EST MOD 30 MIN: CPT | Performed by: FAMILY MEDICINE

## 2022-09-15 RX ORDER — MULTIPLE VITAMINS W/ MINERALS TAB 9MG-400MCG
1 TAB ORAL DAILY
COMMUNITY

## 2022-09-15 ASSESSMENT — PATIENT HEALTH QUESTIONNAIRE - PHQ9
SUM OF ALL RESPONSES TO PHQ QUESTIONS 1-9: 3
SUM OF ALL RESPONSES TO PHQ QUESTIONS 1-9: 3
10. IF YOU CHECKED OFF ANY PROBLEMS, HOW DIFFICULT HAVE THESE PROBLEMS MADE IT FOR YOU TO DO YOUR WORK, TAKE CARE OF THINGS AT HOME, OR GET ALONG WITH OTHER PEOPLE: NOT DIFFICULT AT ALL

## 2022-09-15 ASSESSMENT — PAIN SCALES - GENERAL: PAINLEVEL: NO PAIN (0)

## 2022-09-15 NOTE — PROGRESS NOTES
"  Assessment & Plan     Attention deficit hyperactivity disorder (ADHD), combined type  Chronic, stable. The current medical regimen is effective;  continue present plan and medications.     Systemic lupus erythematosus, unspecified SLE type, unspecified organ involvement status (H)  Chronic continues on Plaquenil. Followed by hematology. Occasional bruising on thighs. The current medical regimen is effective;  continue present plan and medications.     Major depressive disorder, recurrent episode, moderate (H)  Chronic stable. The current medical regimen is effective;  continue present plan and medications.     Herpes zoster without complication  Acute clustering of vesicles on right flank and abdomen. Onset 2 weeks ago with minimal symptoms, all vesicles drying and no pain.                BMI:   Estimated body mass index is 36.47 kg/m  as calculated from the following:    Height as of this encounter: 1.638 m (5' 4.5\").    Weight as of this encounter: 97.9 kg (215 lb 12.8 oz).   Weight management plan: Patient referred to endocrine and/or weight management specialty Discussed healthy diet and exercise guidelines Specific weight management program called [facility] discussed    Work on weight loss  Regular exercise    Return in about 7 weeks (around 11/2/2022) for Follow up, Routine preventive, with me, in person.    Richard Small MD  [facility] NEREIDA Hess is a 42 year old, presenting for the following health issues:  A.D.H.D, Weight Problem, and Rash (stomach)      A.D.H.D  Associated symptoms include a rash.   Rash  Associated symptoms include a rash.   History of Present Illness       Reason for visit:  Medication follow up, weight concern, rash, brusing & pms question    She eats 0-1 servings of fruits and vegetables daily.She consumes 0 sweetened beverage(s) daily.She exercises with enough effort to increase her heart rate 30 to 60 minutes per day.  She exercises with enough " effort to increase her heart rate 4 days per week.   She is taking medications regularly.    Today's PHQ-9         PHQ-9 Total Score: 3    PHQ-9 Q9 Thoughts of better off dead/self-harm past 2 weeks :   Not at all    How difficult have these problems made it for you to do your work, take care of things at home, or get along with other people: Not difficult at all       Patient Active Problem List   Diagnosis     CARDIOVASCULAR SCREENING; LDL GOAL LESS THAN 160     SLE (systemic lupus erythematosus) (H)     Sicca (H)     High risk medication use     Anxiety     Generalized anxiety disorder     Attention deficit hyperactivity disorder (ADHD)     Major depressive disorder, recurrent episode, moderate (H)     Current Outpatient Medications   Medication Sig Dispense Refill     ALPRAZolam (XANAX) 0.5 MG tablet Take 1 tablet (0.5 mg) by mouth as needed for anxiety 30 tablet 0     amphetamine-dextroamphetamine (ADDERALL XR) 30 MG 24 hr capsule Take 1 capsule (30 mg) by mouth daily for 30 days 30 capsule 0     cetirizine (ZYRTEC) 10 MG tablet Take 10 mg by mouth daily       hydroxychloroquine (PLAQUENIL) 200 MG tablet Take 2 tablets (400 mg) by mouth once daily. 180 tablet 4     ibuprofen (ADVIL,MOTRIN) 800 MG tablet Take 1 tablet (800 mg) by mouth every 8 hours as needed for pain 90 tablet 1     multivitamin w/minerals (MULTI-VITAMIN) tablet Take 1 tablet by mouth daily       omeprazole (PRILOSEC) 20 MG DR capsule Take 1 capsule (20 mg) by mouth once daily. Take 30-60 minutes before a meal. 90 capsule 4     triamcinolone (KENALOG) 0.1 % external cream Apply sparingly to affected area two times daily as needed 453.6 g 0     venlafaxine (EFFEXOR-XR) 75 MG 24 hr capsule Take 3 capsules (225 mg) by mouth once daily. 270 capsule 4     [START ON 10/10/2022] amphetamine-dextroamphetamine (ADDERALL XR) 30 MG 24 hr capsule Take 1 capsule (30 mg) by mouth daily for 30 days 30 capsule 0           Review of Systems   Skin: Positive for  "rash.      CONSTITUTIONAL: NEGATIVE for fever, chills, change in weight  INTEGUMENTARY/SKIN: POSITIVE for rash right flank and abdomen  ENT/MOUTH: NEGATIVE for ear, mouth and throat problems  RESP: NEGATIVE for significant cough or SOB  CV: NEGATIVE for chest pain, palpitations or peripheral edema  ROS otherwise negative      Objective    /80 (BP Location: Right arm, Patient Position: Chair, Cuff Size: Adult Large)   Pulse 80   Temp 97.6  F (36.4  C) (Temporal)   Resp 16   Ht 1.638 m (5' 4.5\")   Wt 97.9 kg (215 lb 12.8 oz)   LMP 09/09/2022   SpO2 100%   BMI 36.47 kg/m    Body mass index is 36.47 kg/m .  Physical Exam   GENERAL: healthy, alert, no distress and obese  NECK: no adenopathy, no asymmetry, masses, or scars and thyroid normal to palpation  RESP: lungs clear to auscultation - no rales, rhonchi or wheezes  CV: regular rate and rhythm, normal S1 S2, no S3 or S4, no murmur, click or rub, no peripheral edema and peripheral pulses strong  ABDOMEN: soft, nontender, no hepatosplenomegaly, no masses and bowel sounds normal  SKIN: SKIN: Erythematous, well demarcated eruption with inlfamed papules and vessicles in a dermatomal distribution on the rt flank and abdomen.  Superficial discomfort across dermatome including clear skin.    PSYCH: mentation appears normal, affect normal/bright, anxious, speech pressured, judgement and insight intact and appearance well groomed                    "

## 2022-09-22 NOTE — TELEPHONE ENCOUNTER
Wt Readings from Last 3 Encounters:   09/22/22 127 kg (280 lb 9 6 oz)   06/08/22 129 kg (284 lb 6 3 oz)   03/25/22 129 kg (284 lb)     · Patient reports gaining close to 10 lbs  over the past week despite compliance with home diuretics and diet  · Patient clinically dry on arrival   · S/p Light IVF for 10 hours on admission  · Echo (2/2022): EF 67%  Diastolic function moderately abnormal, consistent with grade 2 relaxation  · Home Medication:  Torsemide 40 mg daily, Spirolactone 25 mg daily  · CT Chest: Mild interstitial pulmonary edema  · BNP: 89  · S/p 1x dose of Furosemide (9/20)  · Patient does not appear to be in acute exacerbation  · Cardiology consulted, recommendations appreciated   · Can transition back to PO diuretics with Torsemide 40 mg daily and Spirolactone 25 mg Daily  · Stable from a cardiac standpoint for discharge  · Continue outpatient follow up with Cardiology, Dr Angelic Hilton upon discharge  · Daily Weights  · Fluid restriction amphetamine-dextroamphetamine (ADDERALL XR) 30 MG 24 hr capsule      Last Written Prescription Date:  3/29/19  Last Fill Quantity: 30,   # refills: 0  Last Office Visit: 2/13/19  Future Office visit:       Routing refill request to provider for review/approval because:  Drug not on the [facility], P or [facility] refill protocol or controlled substance

## 2022-11-03 ENCOUNTER — OFFICE VISIT (OUTPATIENT)
Dept: FAMILY MEDICINE | Facility: CLINIC | Age: 42
End: 2022-11-03
Payer: COMMERCIAL

## 2022-11-03 VITALS
WEIGHT: 216.4 LBS | OXYGEN SATURATION: 98 % | HEIGHT: 65 IN | BODY MASS INDEX: 36.06 KG/M2 | DIASTOLIC BLOOD PRESSURE: 80 MMHG | SYSTOLIC BLOOD PRESSURE: 132 MMHG | HEART RATE: 80 BPM | RESPIRATION RATE: 18 BRPM | TEMPERATURE: 98.8 F

## 2022-11-03 DIAGNOSIS — K21.9 GASTROESOPHAGEAL REFLUX DISEASE WITHOUT ESOPHAGITIS: ICD-10-CM

## 2022-11-03 DIAGNOSIS — M32.9 SYSTEMIC LUPUS ERYTHEMATOSUS, UNSPECIFIED SLE TYPE, UNSPECIFIED ORGAN INVOLVEMENT STATUS (H): ICD-10-CM

## 2022-11-03 DIAGNOSIS — F90.2 ATTENTION DEFICIT HYPERACTIVITY DISORDER (ADHD), COMBINED TYPE: Primary | ICD-10-CM

## 2022-11-03 DIAGNOSIS — Z12.4 CERVICAL CANCER SCREENING: ICD-10-CM

## 2022-11-03 DIAGNOSIS — Z13.220 SCREENING FOR HYPERLIPIDEMIA: ICD-10-CM

## 2022-11-03 DIAGNOSIS — Z00.00 ROUTINE GENERAL MEDICAL EXAMINATION AT A HEALTH CARE FACILITY: ICD-10-CM

## 2022-11-03 DIAGNOSIS — F41.9 ANXIETY: ICD-10-CM

## 2022-11-03 LAB
ALBUMIN SERPL-MCNC: 3.9 G/DL (ref 3.4–5)
ALP SERPL-CCNC: 85 U/L (ref 40–150)
ALT SERPL W P-5'-P-CCNC: 26 U/L (ref 0–50)
ANION GAP SERPL CALCULATED.3IONS-SCNC: 4 MMOL/L (ref 3–14)
AST SERPL W P-5'-P-CCNC: 19 U/L (ref 0–45)
BILIRUB SERPL-MCNC: 0.2 MG/DL (ref 0.2–1.3)
BUN SERPL-MCNC: 13 MG/DL (ref 7–30)
CALCIUM SERPL-MCNC: 9 MG/DL (ref 8.5–10.1)
CHLORIDE BLD-SCNC: 107 MMOL/L (ref 94–109)
CHOLEST SERPL-MCNC: 178 MG/DL
CO2 SERPL-SCNC: 26 MMOL/L (ref 20–32)
CREAT SERPL-MCNC: 0.7 MG/DL (ref 0.52–1.04)
ERYTHROCYTE [DISTWIDTH] IN BLOOD BY AUTOMATED COUNT: 13.6 % (ref 10–15)
FASTING STATUS PATIENT QL REPORTED: YES
GFR SERPL CREATININE-BSD FRML MDRD: >90 ML/MIN/1.73M2
GLUCOSE BLD-MCNC: 102 MG/DL (ref 70–99)
HCT VFR BLD AUTO: 42 % (ref 35–47)
HDLC SERPL-MCNC: 70 MG/DL
HGB BLD-MCNC: 14.5 G/DL (ref 11.7–15.7)
LDLC SERPL CALC-MCNC: 85 MG/DL
MCH RBC QN AUTO: 31.4 PG (ref 26.5–33)
MCHC RBC AUTO-ENTMCNC: 34.5 G/DL (ref 31.5–36.5)
MCV RBC AUTO: 91 FL (ref 78–100)
NONHDLC SERPL-MCNC: 108 MG/DL
PLATELET # BLD AUTO: 348 10E3/UL (ref 150–450)
POTASSIUM BLD-SCNC: 3.8 MMOL/L (ref 3.4–5.3)
PROT SERPL-MCNC: 7.6 G/DL (ref 6.8–8.8)
RBC # BLD AUTO: 4.62 10E6/UL (ref 3.8–5.2)
SODIUM SERPL-SCNC: 137 MMOL/L (ref 133–144)
TRIGL SERPL-MCNC: 115 MG/DL
TSH SERPL DL<=0.005 MIU/L-ACNC: 2.28 MU/L (ref 0.4–4)
WBC # BLD AUTO: 8.2 10E3/UL (ref 4–11)

## 2022-11-03 PROCEDURE — 36415 COLL VENOUS BLD VENIPUNCTURE: CPT | Performed by: FAMILY MEDICINE

## 2022-11-03 PROCEDURE — 87624 HPV HI-RISK TYP POOLED RSLT: CPT | Performed by: FAMILY MEDICINE

## 2022-11-03 PROCEDURE — G0145 SCR C/V CYTO,THINLAYER,RESCR: HCPCS | Performed by: FAMILY MEDICINE

## 2022-11-03 PROCEDURE — 99396 PREV VISIT EST AGE 40-64: CPT | Performed by: FAMILY MEDICINE

## 2022-11-03 PROCEDURE — 84443 ASSAY THYROID STIM HORMONE: CPT | Performed by: FAMILY MEDICINE

## 2022-11-03 PROCEDURE — 80061 LIPID PANEL: CPT | Performed by: FAMILY MEDICINE

## 2022-11-03 PROCEDURE — 85027 COMPLETE CBC AUTOMATED: CPT | Performed by: FAMILY MEDICINE

## 2022-11-03 PROCEDURE — 80053 COMPREHEN METABOLIC PANEL: CPT | Performed by: FAMILY MEDICINE

## 2022-11-03 RX ORDER — VENLAFAXINE HYDROCHLORIDE 75 MG/1
CAPSULE, EXTENDED RELEASE ORAL
Qty: 270 CAPSULE | Refills: 4 | Status: SHIPPED | OUTPATIENT
Start: 2022-11-03 | End: 2023-11-20

## 2022-11-03 RX ORDER — HYDROXYCHLOROQUINE SULFATE 200 MG/1
TABLET, FILM COATED ORAL
Qty: 180 TABLET | Refills: 4 | Status: SHIPPED | OUTPATIENT
Start: 2022-11-03

## 2022-11-03 RX ORDER — DEXTROAMPHETAMINE SACCHARATE, AMPHETAMINE ASPARTATE MONOHYDRATE, DEXTROAMPHETAMINE SULFATE AND AMPHETAMINE SULFATE 7.5; 7.5; 7.5; 7.5 MG/1; MG/1; MG/1; MG/1
30 CAPSULE, EXTENDED RELEASE ORAL DAILY
Qty: 30 CAPSULE | Refills: 0 | Status: SHIPPED | OUTPATIENT
Start: 2022-11-09 | End: 2022-12-09

## 2022-11-03 RX ORDER — DEXTROAMPHETAMINE SACCHARATE, AMPHETAMINE ASPARTATE MONOHYDRATE, DEXTROAMPHETAMINE SULFATE AND AMPHETAMINE SULFATE 7.5; 7.5; 7.5; 7.5 MG/1; MG/1; MG/1; MG/1
30 CAPSULE, EXTENDED RELEASE ORAL DAILY
Qty: 30 CAPSULE | Refills: 0 | Status: SHIPPED | OUTPATIENT
Start: 2022-12-04 | End: 2023-01-03

## 2022-11-03 RX ORDER — DEXTROAMPHETAMINE SACCHARATE, AMPHETAMINE ASPARTATE MONOHYDRATE, DEXTROAMPHETAMINE SULFATE AND AMPHETAMINE SULFATE 7.5; 7.5; 7.5; 7.5 MG/1; MG/1; MG/1; MG/1
30 CAPSULE, EXTENDED RELEASE ORAL DAILY
Qty: 30 CAPSULE | Refills: 0 | Status: SHIPPED | OUTPATIENT
Start: 2023-01-04 | End: 2023-02-03

## 2022-11-03 ASSESSMENT — ENCOUNTER SYMPTOMS
MYALGIAS: 0
DYSURIA: 0
FEVER: 0
PALPITATIONS: 0
DIARRHEA: 0
ARTHRALGIAS: 0
HEADACHES: 0
PARESTHESIAS: 1
HEMATURIA: 0
BREAST MASS: 0
WEAKNESS: 1
HEMATOCHEZIA: 0
NAUSEA: 0
EYE PAIN: 0
CHILLS: 0
CONSTIPATION: 0
SHORTNESS OF BREATH: 0
SORE THROAT: 0
JOINT SWELLING: 1
FREQUENCY: 0
ABDOMINAL PAIN: 0
DIZZINESS: 0
HEARTBURN: 0
NERVOUS/ANXIOUS: 0
COUGH: 0

## 2022-11-03 ASSESSMENT — PAIN SCALES - GENERAL: PAINLEVEL: NO PAIN (0)

## 2022-11-03 NOTE — PROGRESS NOTES
SUBJECTIVE:   CC: Jimena is an 42 year old who presents for preventive health visit.       Patient has been advised of split billing requirements and indicates understanding: Yes  Healthy Habits:     Getting at least 3 servings of Calcium per day:  Yes    Bi-annual eye exam:  Yes    Dental care twice a year:  Yes    Sleep apnea or symptoms of sleep apnea:  Daytime drowsiness    Diet:  Regular (no restrictions)    Frequency of exercise:  2-3 days/week    Duration of exercise:  15-30 minutes    Taking medications regularly:  Yes    Medication side effects:  None    PHQ-2 Total Score: 0    Additional concerns today:  No            Today's PHQ-2 Score:   PHQ-2 (  [employer]) 11/3/2022   Q1: Little interest or pleasure in doing things 0   Q2: Feeling down, depressed or hopeless 0   PHQ-2 Score 0   PHQ-2 Total Score (12-17 Years)- Positive if 3 or more points; Administer PHQ-A if positive -   Q1: Little interest or pleasure in doing things Not at all   Q2: Feeling down, depressed or hopeless Not at all   PHQ-2 Score 0       Abuse: Current or Past (Physical, Sexual or Emotional) - No  Do you feel safe in your environment? Yes        Social History     Tobacco Use     Smoking status: Former     Types: Cigarettes     Quit date: 2013     Years since quittin.3     Smokeless tobacco: Never     Tobacco comments:     social   Substance Use Topics     Alcohol use: Yes     Comment: occ     If you drink alcohol do you typically have >3 drinks per day or >7 drinks per week? No    Alcohol Use 11/3/2022   Prescreen: >3 drinks/day or >7 drinks/week? No   Prescreen: >3 drinks/day or >7 drinks/week? -   AUDIT SCORE  -       Reviewed orders with patient.  Reviewed health maintenance and updated orders accordingly - Yes  Labs reviewed in EPIC  BP Readings from Last 3 Encounters:   22 132/80   09/15/22 136/80   10/22/21 (!) 144/82    Wt Readings from Last 3 Encounters:   22 98.2 kg (216 lb 6.4 oz)   09/15/22 97.9 kg  (215 lb 12.8 oz)   10/22/21 98.9 kg (218 lb)                  Patient Active Problem List   Diagnosis     CARDIOVASCULAR SCREENING; LDL GOAL LESS THAN 160     SLE (systemic lupus erythematosus) (H)     Sicca (H)     High risk medication use     Anxiety     Generalized anxiety disorder     Attention deficit hyperactivity disorder (ADHD)     Major depressive disorder, recurrent episode, moderate (H)     Past Surgical History:   Procedure Laterality Date     BIOPSY       CHOLECYSTECTOMY         Social History     Tobacco Use     Smoking status: Former     Types: Cigarettes     Quit date: 2013     Years since quittin.3     Smokeless tobacco: Never     Tobacco comments:     social   Substance Use Topics     Alcohol use: Yes     Comment: occ     Family History   Problem Relation Age of Onset     Hypertension Father      Breast Cancer Mother      Other Cancer Maternal Grandfather 87        Lymphoma     Diabetes Maternal Grandfather      Cancer Paternal Aunt      Cardiovascular Paternal Uncle         CHF,  in sleep     Hypertension Brother         hypertension, Etoh use     Hyperlipidemia Brother      Breast Cancer Paternal Grandmother      Colon Cancer No family hx of          Current Outpatient Medications   Medication Sig Dispense Refill     ALPRAZolam (XANAX) 0.5 MG tablet Take 1 tablet (0.5 mg) by mouth as needed for anxiety 30 tablet 0     amphetamine-dextroamphetamine (ADDERALL XR) 30 MG 24 hr capsule Take 1 capsule (30 mg) by mouth daily for 30 days 30 capsule 0     cetirizine (ZYRTEC) 10 MG tablet Take 10 mg by mouth daily       hydroxychloroquine (PLAQUENIL) 200 MG tablet Take 2 tablets (400 mg) by mouth once daily. 180 tablet 4     ibuprofen (ADVIL,MOTRIN) 800 MG tablet Take 1 tablet (800 mg) by mouth every 8 hours as needed for pain 90 tablet 1     multivitamin w/minerals (THERA-VIT-M) tablet Take 1 tablet by mouth daily       omeprazole (PRILOSEC) 20 MG DR capsule Take 1 capsule (20 mg) by mouth  once daily. Take 30-60 minutes before a meal. 90 capsule 4     triamcinolone (KENALOG) 0.1 % external cream Apply sparingly to affected area two times daily as needed 453.6 g 0     venlafaxine (EFFEXOR-XR) 75 MG 24 hr capsule Take 3 capsules (225 mg) by mouth once daily. 270 capsule 4     Allergies   Allergen Reactions     Nickel Chloride  [Nickel] Dermatitis     No Clinical Screening - See Comments Dermatitis     Zoloft [Sertraline] Anxiety     Cliches jaw, Increased anxiety     Recent Labs   Lab Test 09/30/20  1042 09/23/19  1118 08/08/16  0844 01/14/15  1450 09/10/14  1107 09/10/14  1106   LDL  --   --   --   --   --  63   HDL  --   --   --   --   --  80   TRIG  --   --   --   --   --  63   ALT 30 34 17 18   < >  --    CR 0.75 0.74 0.70 1.00   < >  --    GFRESTIMATED >90 >90 >90  Non  GFR Calc   64   < >  --    GFRESTBLACK >90 >90 >90   GFR Calc   77   < >  --    POTASSIUM 4.1 4.0 4.5 3.8   < >  --    TSH  --  2.66  --  0.76  --   --     < > = values in this interval not displayed.        Breast Cancer Screening:    FHS-7:   Breast CA Risk Assessment (FHS-7) 10/22/2021 7/14/2022 11/3/2022   Did any of your first-degree relatives have breast or ovarian cancer? Yes Yes Yes   Did any of your relatives have bilateral breast cancer? Yes No Yes   Did any man in your family have breast cancer? No No No   Did any woman in your family have breast and ovarian cancer? Yes No Yes   Did any woman in your family have breast cancer before age 50 y? Yes Yes Yes   Do you have 2 or more relatives with breast and/or ovarian cancer? Yes Yes Yes   Do you have 2 or more relatives with breast and/or bowel cancer? Yes Yes No       Mammogram Screening - Offered annual screening and updated Health Maintenance for mutual plan based on risk factor consideration    Pertinent mammograms are reviewed under the imaging tab.    History of abnormal Pap smear: NO - age 30-65 PAP every 5 years with negative HPV  "co-testing recommended  PAP / HPV 9/10/2014 9/4/2013   PAP (Historical) OTHER-NIL, See Result UNSAT     Reviewed and updated as needed this visit by clinical staff   Tobacco  Allergies  Meds  Problems  Med Hx  Surg Hx  Fam Hx  Soc   Hx        Reviewed and updated as needed this visit by Provider   Tobacco  Allergies  Meds  Problems  Med Hx  Surg Hx  Fam Hx             Review of Systems   Constitutional: Negative for chills and fever.   HENT: Negative for congestion, ear pain, hearing loss and sore throat.    Eyes: Negative for pain and visual disturbance.   Respiratory: Negative for cough and shortness of breath.    Cardiovascular: Positive for peripheral edema. Negative for chest pain and palpitations.   Gastrointestinal: Negative for abdominal pain, constipation, diarrhea, heartburn, hematochezia and nausea.   Breasts:  Negative for tenderness, breast mass and discharge.   Genitourinary: Positive for vaginal discharge. Negative for dysuria, frequency, genital sores, hematuria, pelvic pain, urgency and vaginal bleeding.   Musculoskeletal: Positive for joint swelling. Negative for arthralgias and myalgias.   Skin: Positive for rash.   Neurological: Positive for weakness and paresthesias. Negative for dizziness and headaches.   Psychiatric/Behavioral: Negative for mood changes. The patient is not nervous/anxious.           OBJECTIVE:   /80 (BP Location: Left arm, Patient Position: Chair, Cuff Size: Adult Large)   Pulse 80   Temp 98.8  F (37.1  C) (Temporal)   Resp 18   Ht 1.638 m (5' 4.5\")   Wt 98.2 kg (216 lb 6.4 oz)   LMP 09/29/2022   SpO2 98%   BMI 36.57 kg/m    Physical Exam  GENERAL: healthy, alert, no distress and obese  EYES: Eyes grossly normal to inspection, PERRL and conjunctivae and sclerae normal  HENT: ear canals and TM's normal, nose and mouth without ulcers or lesions  NECK: no adenopathy, no asymmetry, masses, or scars and thyroid normal to palpation  RESP: lungs clear to " auscultation - no rales, rhonchi or wheezes  CV: regular rate and rhythm, normal S1 S2, no S3 or S4, no murmur, click or rub, no peripheral edema and peripheral pulses strong  ABDOMEN: soft, nontender, no hepatosplenomegaly, no masses and bowel sounds normal   (female): normal female external genitalia, normal urethral meatus, vaginal mucosa pink, moist, well rugated, and normal cervix/adnexa/uterus without masses or discharge  MS: no gross musculoskeletal defects noted, no edema  SKIN: no suspicious lesions or rashes  NEURO: Normal strength and tone, mentation intact and speech normal  PSYCH: mentation appears normal, affect normal/bright  LYMPH: no cervical, supraclavicular, axillary, or inguinal adenopathy    Diagnostic Test Results:  Labs reviewed in Epic    ASSESSMENT/PLAN:       ICD-10-CM    1. Attention deficit hyperactivity disorder (ADHD), combined type  F90.2 amphetamine-dextroamphetamine (ADDERALL XR) 30 MG 24 hr capsule     amphetamine-dextroamphetamine (ADDERALL XR) 30 MG 24 hr capsule     amphetamine-dextroamphetamine (ADDERALL XR) 30 MG 24 hr capsule      2. Screening for hyperlipidemia  Z13.220 CANCELED: Lipid panel reflex to direct LDL Non-fasting      3. Cervical cancer screening  Z12.4 Pap Screen with HPV - recommended age 30 - 65 years      4. Routine general medical examination at a health care facility  Z00.00 CBC with platelets     Lipid panel reflex to direct LDL Fasting     Comprehensive metabolic panel (BMP + Alb, Alk Phos, ALT, AST, Total. Bili, TP)     TSH with free T4 reflex      5. Systemic lupus erythematosus, unspecified SLE type, unspecified organ involvement status (H)  M32.9 hydroxychloroquine (PLAQUENIL) 200 MG tablet      6. Anxiety  F41.9 venlafaxine (EFFEXOR XR) 75 MG 24 hr capsule      7. Gastroesophageal reflux disease without esophagitis  K21.9 omeprazole (PRILOSEC) 20 MG DR capsule          Patient has been advised of split billing requirements and indicates  "understanding: Yes      COUNSELING:  Reviewed preventive health counseling, as reflected in patient instructions       Regular exercise       Healthy diet/nutrition       Vision screening       Immunizations    Declined: Influenza and COVID booster due to Concerns about side effects/safety               (Michaela)menopause management    Estimated body mass index is 36.57 kg/m  as calculated from the following:    Height as of this encounter: 1.638 m (5' 4.5\").    Weight as of this encounter: 98.2 kg (216 lb 6.4 oz).    Weight management plan: Discussed healthy diet and exercise guidelines    She reports that she quit smoking about 9 years ago. She has never used smokeless tobacco.      Counseling Resources:  ATP IV Guidelines  Pooled Cohorts Equation Calculator  Breast Cancer Risk Calculator  BRCA-Related Cancer Risk Assessment: FHS-7 Tool  FRAX Risk Assessment  ICSI Preventive Guidelines  Dietary Guidelines for Americans, 2010  USDA's MyPlate  ASA Prophylaxis  Lung CA Screening    Richard Small MD  [facility]  "

## 2022-11-07 ENCOUNTER — MYC MEDICAL ADVICE (OUTPATIENT)
Dept: FAMILY MEDICINE | Facility: CLINIC | Age: 42
End: 2022-11-07

## 2022-11-07 LAB
BKR LAB AP GYN ADEQUACY: NORMAL
BKR LAB AP GYN INTERPRETATION: NORMAL
BKR LAB AP HPV REFLEX: NORMAL
BKR LAB AP LMP: NORMAL
BKR LAB AP PREVIOUS ABNORMAL: NORMAL
PATH REPORT.COMMENTS IMP SPEC: NORMAL
PATH REPORT.COMMENTS IMP SPEC: NORMAL
PATH REPORT.RELEVANT HX SPEC: NORMAL

## 2022-11-07 NOTE — LETTER
[address] [address]  [phone]        November 18, 2022    Jimena Moreira  [id number] [address] [place] [id number]

## 2022-11-09 LAB
HUMAN PAPILLOMA VIRUS 16 DNA: NEGATIVE
HUMAN PAPILLOMA VIRUS 18 DNA: NEGATIVE
HUMAN PAPILLOMA VIRUS FINAL DIAGNOSIS: NORMAL
HUMAN PAPILLOMA VIRUS OTHER HR: NEGATIVE

## 2023-03-13 ENCOUNTER — MYC MEDICAL ADVICE (OUTPATIENT)
Dept: FAMILY MEDICINE | Facility: CLINIC | Age: 43
End: 2023-03-13
Payer: COMMERCIAL

## 2023-03-13 DIAGNOSIS — F90.2 ATTENTION DEFICIT HYPERACTIVITY DISORDER (ADHD), COMBINED TYPE: Primary | ICD-10-CM

## 2023-03-13 RX ORDER — DEXTROAMPHETAMINE SACCHARATE, AMPHETAMINE ASPARTATE MONOHYDRATE, DEXTROAMPHETAMINE SULFATE AND AMPHETAMINE SULFATE 7.5; 7.5; 7.5; 7.5 MG/1; MG/1; MG/1; MG/1
30 CAPSULE, EXTENDED RELEASE ORAL DAILY
Qty: 30 CAPSULE | Refills: 0 | Status: SHIPPED | OUTPATIENT
Start: 2023-03-13 | End: 2023-03-14

## 2023-03-13 NOTE — TELEPHONE ENCOUNTER
"I have sent over prescription for 1 month of the Adderall.    With respect to the birth control pills that they \"talked about before\" this 43-year-old female will just have to wait until her primary care provider returns.  I have no idea what they were talking about and have no expertise in oral birth control.    I apologize for the inconvenience.    Alberto  "

## 2023-03-14 RX ORDER — DEXTROAMPHETAMINE SACCHARATE, AMPHETAMINE ASPARTATE MONOHYDRATE, DEXTROAMPHETAMINE SULFATE AND AMPHETAMINE SULFATE 7.5; 7.5; 7.5; 7.5 MG/1; MG/1; MG/1; MG/1
30 CAPSULE, EXTENDED RELEASE ORAL DAILY
Qty: 30 CAPSULE | Refills: 0 | Status: SHIPPED | OUTPATIENT
Start: 2023-03-14 | End: 2023-03-20

## 2023-03-14 NOTE — TELEPHONE ENCOUNTER
Please advise on birth control for patient.    *patient is aware that Dr. Small is out of the office until next Wednesday 03/22/23.

## 2023-03-14 NOTE — TELEPHONE ENCOUNTER
Patient reports that she is needing her Adderall prescription sent to [facility] in [place].    Prescription re-pended to go to updated pharmacy.

## 2023-03-20 RX ORDER — DEXTROAMPHETAMINE SACCHARATE, AMPHETAMINE ASPARTATE MONOHYDRATE, DEXTROAMPHETAMINE SULFATE AND AMPHETAMINE SULFATE 7.5; 7.5; 7.5; 7.5 MG/1; MG/1; MG/1; MG/1
30 CAPSULE, EXTENDED RELEASE ORAL DAILY
Qty: 30 CAPSULE | Refills: 0 | Status: SHIPPED | OUTPATIENT
Start: 2023-03-20 | End: 2023-08-17

## 2023-03-20 NOTE — ADDENDUM NOTE
Addended by: PAOLO BARROSO on: 3/20/2023 08:35 AM     Modules accepted: Orders     Chief Complaint   Patient presents with    Well Child     18month

## 2023-04-03 ENCOUNTER — MYC MEDICAL ADVICE (OUTPATIENT)
Dept: FAMILY MEDICINE | Facility: CLINIC | Age: 43
End: 2023-04-03
Payer: COMMERCIAL

## 2023-04-03 DIAGNOSIS — Z30.8 ENCOUNTER FOR OTHER CONTRACEPTIVE MANAGEMENT: ICD-10-CM

## 2023-04-05 RX ORDER — ETONOGESTREL AND ETHINYL ESTRADIOL VAGINAL RING .015; .12 MG/D; MG/D
1 RING VAGINAL
Qty: 3 EACH | Refills: 2 | Status: SHIPPED | OUTPATIENT
Start: 2023-04-05 | End: 2023-11-20

## 2023-04-23 ENCOUNTER — HEALTH MAINTENANCE LETTER (OUTPATIENT)
Age: 43
End: 2023-04-23

## 2023-06-14 ENCOUNTER — PATIENT OUTREACH (OUTPATIENT)
Dept: CARE COORDINATION | Facility: CLINIC | Age: 43
End: 2023-06-14
Payer: COMMERCIAL

## 2023-06-22 ENCOUNTER — MYC REFILL (OUTPATIENT)
Dept: FAMILY MEDICINE | Facility: CLINIC | Age: 43
End: 2023-06-22
Payer: COMMERCIAL

## 2023-06-22 DIAGNOSIS — F90.2 ATTENTION DEFICIT HYPERACTIVITY DISORDER (ADHD), COMBINED TYPE: ICD-10-CM

## 2023-06-22 RX ORDER — DEXTROAMPHETAMINE SACCHARATE, AMPHETAMINE ASPARTATE MONOHYDRATE, DEXTROAMPHETAMINE SULFATE AND AMPHETAMINE SULFATE 7.5; 7.5; 7.5; 7.5 MG/1; MG/1; MG/1; MG/1
30 CAPSULE, EXTENDED RELEASE ORAL DAILY
Qty: 30 CAPSULE | Refills: 0 | Status: CANCELLED | OUTPATIENT
Start: 2023-06-22

## 2023-06-22 RX ORDER — DEXTROAMPHETAMINE SACCHARATE, AMPHETAMINE ASPARTATE MONOHYDRATE, DEXTROAMPHETAMINE SULFATE AND AMPHETAMINE SULFATE 7.5; 7.5; 7.5; 7.5 MG/1; MG/1; MG/1; MG/1
30 CAPSULE, EXTENDED RELEASE ORAL DAILY
Qty: 30 CAPSULE | Refills: 0 | Status: SHIPPED | OUTPATIENT
Start: 2023-08-23 | End: 2023-09-22

## 2023-06-22 RX ORDER — DEXTROAMPHETAMINE SACCHARATE, AMPHETAMINE ASPARTATE MONOHYDRATE, DEXTROAMPHETAMINE SULFATE AND AMPHETAMINE SULFATE 7.5; 7.5; 7.5; 7.5 MG/1; MG/1; MG/1; MG/1
30 CAPSULE, EXTENDED RELEASE ORAL DAILY
Qty: 30 CAPSULE | Refills: 0 | Status: SHIPPED | OUTPATIENT
Start: 2023-06-22 | End: 2023-07-22

## 2023-06-22 RX ORDER — DEXTROAMPHETAMINE SACCHARATE, AMPHETAMINE ASPARTATE MONOHYDRATE, DEXTROAMPHETAMINE SULFATE AND AMPHETAMINE SULFATE 7.5; 7.5; 7.5; 7.5 MG/1; MG/1; MG/1; MG/1
30 CAPSULE, EXTENDED RELEASE ORAL DAILY
Qty: 30 CAPSULE | Refills: 0 | Status: SHIPPED | OUTPATIENT
Start: 2023-07-23 | End: 2023-08-22

## 2023-06-22 NOTE — TELEPHONE ENCOUNTER
Patient requesting a 3 month supply if possible.     Requested Prescriptions   Pending Prescriptions Disp Refills    amphetamine-dextroamphetamine (ADDERALL XR) 30 MG 24 hr capsule 30 capsule 0     Sig: Take 1 capsule (30 mg) by mouth daily       There is no refill protocol information for this order

## 2023-06-23 DIAGNOSIS — F41.9 ANXIETY: ICD-10-CM

## 2023-06-24 RX ORDER — ALPRAZOLAM 0.5 MG
0.5 TABLET ORAL PRN
Qty: 30 TABLET | Refills: 0 | Status: SHIPPED | OUTPATIENT
Start: 2023-06-24 | End: 2024-03-05

## 2023-07-12 ENCOUNTER — PATIENT OUTREACH (OUTPATIENT)
Dept: CARE COORDINATION | Facility: CLINIC | Age: 43
End: 2023-07-12
Payer: COMMERCIAL

## 2023-07-17 ENCOUNTER — HOSPITAL ENCOUNTER (OUTPATIENT)
Dept: MAMMOGRAPHY | Facility: CLINIC | Age: 43
Discharge: HOME | End: 2023-07-17
Attending: FAMILY MEDICINE | Admitting: FAMILY MEDICINE
Payer: COMMERCIAL

## 2023-07-17 DIAGNOSIS — Z12.31 VISIT FOR SCREENING MAMMOGRAM: ICD-10-CM

## 2023-07-17 PROCEDURE — 77067 SCR MAMMO BI INCL CAD: CPT

## 2023-08-17 ENCOUNTER — OFFICE VISIT (OUTPATIENT)
Dept: FAMILY MEDICINE | Facility: CLINIC | Age: 43
End: 2023-08-17
Payer: COMMERCIAL

## 2023-08-17 VITALS
BODY MASS INDEX: 33.7 KG/M2 | SYSTOLIC BLOOD PRESSURE: 138 MMHG | DIASTOLIC BLOOD PRESSURE: 72 MMHG | HEIGHT: 65 IN | TEMPERATURE: 97.4 F | WEIGHT: 202.25 LBS | RESPIRATION RATE: 18 BRPM | HEART RATE: 104 BPM | OXYGEN SATURATION: 99 %

## 2023-08-17 DIAGNOSIS — F33.1 MAJOR DEPRESSIVE DISORDER, RECURRENT EPISODE, MODERATE (H): ICD-10-CM

## 2023-08-17 DIAGNOSIS — F90.2 ATTENTION DEFICIT HYPERACTIVITY DISORDER (ADHD), COMBINED TYPE: Primary | ICD-10-CM

## 2023-08-17 DIAGNOSIS — M32.9 SYSTEMIC LUPUS ERYTHEMATOSUS, UNSPECIFIED SLE TYPE, UNSPECIFIED ORGAN INVOLVEMENT STATUS (H): ICD-10-CM

## 2023-08-17 PROCEDURE — 99213 OFFICE O/P EST LOW 20 MIN: CPT | Performed by: FAMILY MEDICINE

## 2023-08-17 RX ORDER — DEXTROAMPHETAMINE SACCHARATE, AMPHETAMINE ASPARTATE MONOHYDRATE, DEXTROAMPHETAMINE SULFATE AND AMPHETAMINE SULFATE 7.5; 7.5; 7.5; 7.5 MG/1; MG/1; MG/1; MG/1
30 CAPSULE, EXTENDED RELEASE ORAL DAILY
Qty: 30 CAPSULE | Refills: 0 | Status: SHIPPED | OUTPATIENT
Start: 2023-09-22 | End: 2023-10-22

## 2023-08-17 RX ORDER — DEXTROAMPHETAMINE SACCHARATE, AMPHETAMINE ASPARTATE MONOHYDRATE, DEXTROAMPHETAMINE SULFATE AND AMPHETAMINE SULFATE 7.5; 7.5; 7.5; 7.5 MG/1; MG/1; MG/1; MG/1
30 CAPSULE, EXTENDED RELEASE ORAL DAILY
Qty: 30 CAPSULE | Refills: 0 | Status: SHIPPED | OUTPATIENT
Start: 2023-09-17 | End: 2023-10-17

## 2023-08-17 RX ORDER — DEXTROAMPHETAMINE SACCHARATE, AMPHETAMINE ASPARTATE MONOHYDRATE, DEXTROAMPHETAMINE SULFATE AND AMPHETAMINE SULFATE 7.5; 7.5; 7.5; 7.5 MG/1; MG/1; MG/1; MG/1
30 CAPSULE, EXTENDED RELEASE ORAL DAILY
Qty: 30 CAPSULE | Refills: 0 | Status: SHIPPED | OUTPATIENT
Start: 2023-10-18 | End: 2023-11-17

## 2023-08-17 ASSESSMENT — PATIENT HEALTH QUESTIONNAIRE - PHQ9
10. IF YOU CHECKED OFF ANY PROBLEMS, HOW DIFFICULT HAVE THESE PROBLEMS MADE IT FOR YOU TO DO YOUR WORK, TAKE CARE OF THINGS AT HOME, OR GET ALONG WITH OTHER PEOPLE: SOMEWHAT DIFFICULT
SUM OF ALL RESPONSES TO PHQ QUESTIONS 1-9: 5
SUM OF ALL RESPONSES TO PHQ QUESTIONS 1-9: 5

## 2023-08-17 ASSESSMENT — ENCOUNTER SYMPTOMS: NERVOUS/ANXIOUS: 1

## 2023-11-20 ENCOUNTER — OFFICE VISIT (OUTPATIENT)
Dept: FAMILY MEDICINE | Facility: CLINIC | Age: 43
End: 2023-11-20
Payer: COMMERCIAL

## 2023-11-20 VITALS
WEIGHT: 208 LBS | DIASTOLIC BLOOD PRESSURE: 78 MMHG | HEIGHT: 65 IN | OXYGEN SATURATION: 98 % | RESPIRATION RATE: 12 BRPM | BODY MASS INDEX: 34.66 KG/M2 | SYSTOLIC BLOOD PRESSURE: 128 MMHG | HEART RATE: 117 BPM

## 2023-11-20 DIAGNOSIS — Z00.00 ROUTINE GENERAL MEDICAL EXAMINATION AT A HEALTH CARE FACILITY: Primary | ICD-10-CM

## 2023-11-20 DIAGNOSIS — Z30.8 ENCOUNTER FOR OTHER CONTRACEPTIVE MANAGEMENT: ICD-10-CM

## 2023-11-20 DIAGNOSIS — K21.9 GASTROESOPHAGEAL REFLUX DISEASE WITHOUT ESOPHAGITIS: ICD-10-CM

## 2023-11-20 DIAGNOSIS — M32.9 SYSTEMIC LUPUS ERYTHEMATOSUS, UNSPECIFIED SLE TYPE, UNSPECIFIED ORGAN INVOLVEMENT STATUS (H): ICD-10-CM

## 2023-11-20 DIAGNOSIS — F41.9 ANXIETY: ICD-10-CM

## 2023-11-20 LAB
CRP SERPL-MCNC: 38.36 MG/L
ERYTHROCYTE [DISTWIDTH] IN BLOOD BY AUTOMATED COUNT: 13.4 % (ref 10–15)
ERYTHROCYTE [SEDIMENTATION RATE] IN BLOOD BY WESTERGREN METHOD: 22 MM/HR (ref 0–20)
HCT VFR BLD AUTO: 42.2 % (ref 35–47)
HGB BLD-MCNC: 13.5 G/DL (ref 11.7–15.7)
MCH RBC QN AUTO: 28.1 PG (ref 26.5–33)
MCHC RBC AUTO-ENTMCNC: 32 G/DL (ref 31.5–36.5)
MCV RBC AUTO: 88 FL (ref 78–100)
PLATELET # BLD AUTO: 410 10E3/UL (ref 150–450)
RBC # BLD AUTO: 4.81 10E6/UL (ref 3.8–5.2)
WBC # BLD AUTO: 7.4 10E3/UL (ref 4–11)

## 2023-11-20 PROCEDURE — 36415 COLL VENOUS BLD VENIPUNCTURE: CPT | Performed by: FAMILY MEDICINE

## 2023-11-20 PROCEDURE — 85027 COMPLETE CBC AUTOMATED: CPT | Performed by: FAMILY MEDICINE

## 2023-11-20 PROCEDURE — 99396 PREV VISIT EST AGE 40-64: CPT | Performed by: FAMILY MEDICINE

## 2023-11-20 PROCEDURE — 86140 C-REACTIVE PROTEIN: CPT | Performed by: FAMILY MEDICINE

## 2023-11-20 PROCEDURE — 99213 OFFICE O/P EST LOW 20 MIN: CPT | Mod: 25 | Performed by: FAMILY MEDICINE

## 2023-11-20 PROCEDURE — 85652 RBC SED RATE AUTOMATED: CPT | Performed by: FAMILY MEDICINE

## 2023-11-20 RX ORDER — IBUPROFEN 800 MG/1
800 TABLET, FILM COATED ORAL EVERY 8 HOURS PRN
Qty: 90 TABLET | Refills: 3 | Status: SHIPPED | OUTPATIENT
Start: 2023-11-20

## 2023-11-20 RX ORDER — VENLAFAXINE HYDROCHLORIDE 75 MG/1
CAPSULE, EXTENDED RELEASE ORAL
Qty: 270 CAPSULE | Refills: 4 | Status: SHIPPED | OUTPATIENT
Start: 2023-11-20

## 2023-11-20 RX ORDER — ETONOGESTREL AND ETHINYL ESTRADIOL VAGINAL RING .015; .12 MG/D; MG/D
1 RING VAGINAL
Qty: 3 EACH | Refills: 2 | Status: SHIPPED | OUTPATIENT
Start: 2023-11-20 | End: 2024-09-11

## 2023-11-20 ASSESSMENT — ENCOUNTER SYMPTOMS
CONSTIPATION: 0
BREAST MASS: 0
FEVER: 0
CHILLS: 1
FREQUENCY: 1
NAUSEA: 1
DIZZINESS: 1
ABDOMINAL PAIN: 0
PARESTHESIAS: 1
HEMATOCHEZIA: 0
EYE PAIN: 0
HEMATURIA: 0
HEARTBURN: 1
JOINT SWELLING: 1
HEADACHES: 1
ARTHRALGIAS: 1
NERVOUS/ANXIOUS: 1
SHORTNESS OF BREATH: 1
PALPITATIONS: 1
MYALGIAS: 1
COUGH: 0
DIARRHEA: 1
DYSURIA: 0
WEAKNESS: 1
SORE THROAT: 1

## 2023-11-20 NOTE — PROGRESS NOTES
SUBJECTIVE:   Jimena is a 43 year old, presenting for the following:  Physical        11/20/2023     1:44 PM   Additional Questions   Roomed by Randal PATEL       Healthy Habits:     Getting at least 3 servings of Calcium per day:  Yes    Bi-annual eye exam:  Yes    Dental care twice a year:  NO    Sleep apnea or symptoms of sleep apnea:  None and Daytime drowsiness    Diet:  Regular (no restrictions)    Frequency of exercise:  2-3 days/week    Duration of exercise:  15-30 minutes    Taking medications regularly:  Yes    Barriers to taking medications:  None    Medication side effects:  Not applicable    Additional concerns today:  Yes                Anxiety Follow-Up  How are you doing with your anxiety since your last visit? No change  Are you having other symptoms that might be associated with anxiety? No  Have you had a significant life event? No   Are you feeling depressed? No  Do you have any concerns with your use of alcohol or other drugs? No    Social History     Tobacco Use    Smoking status: Former     Types: Cigarettes     Quit date: 6/19/2013     Years since quitting: 10.4    Smokeless tobacco: Never    Tobacco comments:     social   Vaping Use    Vaping Use: Never used   Substance Use Topics    Alcohol use: Yes     Comment: occ    Drug use: No         2/5/2020     9:45 AM 6/17/2021     3:38 PM 10/22/2021     4:02 PM   JET-7 SCORE   Total Score 6 5 5         10/22/2021     4:02 PM 9/15/2022    12:55 PM 8/17/2023    12:40 PM   PHQ   PHQ-9 Total Score 3 3 5   Q9: Thoughts of better off dead/self-harm past 2 weeks Not at all Not at all Not at all         8/17/2023    12:40 PM   Last PHQ-9   1.  Little interest or pleasure in doing things 0   2.  Feeling down, depressed, or hopeless 0   3.  Trouble falling or staying asleep, or sleeping too much 3   4.  Feeling tired or having little energy 1   5.  Poor appetite or overeating 0   6.  Feeling bad about yourself 1   7.  Trouble concentrating 0   8.  Moving  slowly or restless 0   Q9: Thoughts of better off dead/self-harm past 2 weeks 0   PHQ-9 Total Score 5         10/22/2021     4:02 PM   JET-7    1. Feeling nervous, anxious, or on edge 1   2. Not being able to stop or control worrying 2   3. Worrying too much about different things 1   4. Trouble relaxing 0   5. Being so restless that it is hard to sit still 0   6. Becoming easily annoyed or irritable 0   7. Feeling afraid, as if something awful might happen 1   JET-7 Total Score 5   If you checked any problems, how difficult have they made it for you to do your work, take care of things at home, or get along with other people? Somewhat difficult         Social History     Tobacco Use    Smoking status: Former     Types: Cigarettes     Quit date: 6/19/2013     Years since quitting: 10.4    Smokeless tobacco: Never    Tobacco comments:     social   Substance Use Topics    Alcohol use: Yes     Comment: occ             11/20/2023    10:23 AM   Alcohol Use   Prescreen: >3 drinks/day or >7 drinks/week? No     Reviewed orders with patient.  Reviewed health maintenance and updated orders accordingly - Yes  Lab work is in process  Labs reviewed in EPIC  BP Readings from Last 3 Encounters:   11/20/23 128/78   08/17/23 138/72   11/03/22 132/80    Wt Readings from Last 3 Encounters:   11/20/23 94.3 kg (208 lb)   08/17/23 91.7 kg (202 lb 4 oz)   11/03/22 98.2 kg (216 lb 6.4 oz)                  Patient Active Problem List   Diagnosis    CARDIOVASCULAR SCREENING; LDL GOAL LESS THAN 160    SLE (systemic lupus erythematosus) (H)    Sicca (H24)    High risk medication use    Anxiety    Generalized anxiety disorder    Attention deficit hyperactivity disorder (ADHD)    Major depressive disorder, recurrent episode, moderate (H)    Screening for cervical cancer     Past Surgical History:   Procedure Laterality Date    BIOPSY      CHOLECYSTECTOMY         Social History     Tobacco Use    Smoking status: Former     Types: Cigarettes      Quit date: 2013     Years since quitting: 10.4    Smokeless tobacco: Never    Tobacco comments:     social   Substance Use Topics    Alcohol use: Yes     Comment: occ     Family History   Problem Relation Age of Onset    Hypertension Father     Breast Cancer Mother     Other Cancer Maternal Grandfather 87        Lymphoma    Diabetes Maternal Grandfather     Cancer Paternal Aunt     Cardiovascular Paternal Uncle         CHF,  in sleep    Hypertension Brother         hypertension, Etoh use    Hyperlipidemia Brother     Breast Cancer Paternal Grandmother     Colon Cancer No family hx of          Current Outpatient Medications   Medication Sig Dispense Refill    ALPRAZolam (XANAX) 0.5 MG tablet Take 1 tablet (0.5 mg) by mouth as needed for anxiety 30 tablet 0    cetirizine (ZYRTEC) 10 MG tablet Take 10 mg by mouth daily      etonogestrel-ethinyl estradiol (NUVARING) 0.12-0.015 MG/24HR vaginal ring Place 1 each vaginally every 28 days 3 each 2    hydroxychloroquine (PLAQUENIL) 200 MG tablet Take 2 tablets (400 mg) by mouth once daily. 180 tablet 4    ibuprofen (ADVIL,MOTRIN) 800 MG tablet Take 1 tablet (800 mg) by mouth every 8 hours as needed for pain 90 tablet 1    multivitamin w/minerals (THERA-VIT-M) tablet Take 1 tablet by mouth daily      omeprazole (PRILOSEC) 20 MG DR capsule Take 1 capsule (20 mg) by mouth once daily. Take 30-60 minutes before a meal. 90 capsule 4    triamcinolone (KENALOG) 0.1 % external cream Apply sparingly to affected area two times daily as needed 453.6 g 0    venlafaxine (EFFEXOR XR) 75 MG 24 hr capsule Take 3 capsules (225 mg) by mouth once daily. 270 capsule 4     Allergies   Allergen Reactions    Nickel Chloride  [Nickel] Dermatitis    No Clinical Screening - See Comments Dermatitis    Zoloft [Sertraline] Anxiety     Cliches jaw, Increased anxiety     Recent Labs   Lab Test 22  1525 20  1042 19  1118   LDL 85  --   --    HDL 70  --   --    TRIG 115  --   --     ALT 26 30 34   CR 0.70 0.75 0.74   GFRESTIMATED >90 >90 >90   GFRESTBLACK  --  >90 >90   POTASSIUM 3.8 4.1 4.0   TSH 2.28  --  2.66        Breast Cancer Screening:    FHS-7:       10/22/2021     3:25 PM 7/14/2022     8:06 AM 11/3/2022     1:39 PM 7/17/2023     8:08 AM 11/20/2023    10:25 AM   Breast CA Risk Assessment (FHS-7)   Did any of your first-degree relatives have breast or ovarian cancer? Yes Yes Yes Yes Yes   Did any of your relatives have bilateral breast cancer? Yes No Yes No Yes   Did any man in your family have breast cancer? No No No No No   Did any woman in your family have breast and ovarian cancer? Yes No Yes No Yes   Did any woman in your family have breast cancer before age 50 y? Yes Yes Yes Yes Yes   Do you have 2 or more relatives with breast and/or ovarian cancer? Yes Yes Yes Yes Yes   Do you have 2 or more relatives with breast and/or bowel cancer? Yes Yes No Yes Yes       Mammogram Screening - Offered annual screening and updated Health Maintenance for mutual plan based on risk factor consideration  Pertinent mammograms are reviewed under the imaging tab.    History of abnormal Pap smear: NO - age 30- 65 PAP every 3 years recommended      Latest Ref Rng & Units 11/3/2022     2:56 PM 11/20/2017    12:00 AM 9/10/2014    12:00 AM   PAP / HPV   PAP  Negative for Intraepithelial Lesion or Malignancy (NILM)      PAP (Historical)    OTHER-NIL, See Result    HPV 16 DNA Negative Negative      HPV 18 DNA Negative Negative      Other HR HPV Negative Negative      PAP-ABSTRACT   See Scanned Document            This result is from an external source.     Reviewed and updated as needed this visit by clinical staff   Tobacco  Allergies  Meds              Reviewed and updated as needed this visit by Provider                     Review of Systems   Constitutional:  Positive for chills. Negative for fever.   HENT:  Positive for sore throat. Negative for congestion, ear pain and hearing loss.    Eyes:   "Negative for pain and visual disturbance.   Respiratory:  Positive for shortness of breath. Negative for cough.    Cardiovascular:  Positive for chest pain, palpitations and peripheral edema.   Gastrointestinal:  Positive for diarrhea, heartburn and nausea. Negative for abdominal pain, constipation and hematochezia.   Breasts:  Positive for discharge. Negative for tenderness and breast mass.   Genitourinary:  Positive for frequency. Negative for dysuria, genital sores, hematuria, pelvic pain, urgency, vaginal bleeding and vaginal discharge.   Musculoskeletal:  Positive for arthralgias, joint swelling and myalgias.   Skin:  Positive for rash.   Neurological:  Positive for dizziness, weakness, headaches and paresthesias.   Psychiatric/Behavioral:  Positive for mood changes. The patient is nervous/anxious.           OBJECTIVE:   /78   Pulse 117   Resp 12   Ht 1.64 m (5' 4.57\")   Wt 94.3 kg (208 lb)   LMP 11/13/2023   SpO2 98%   BMI 35.08 kg/m    Physical Exam  GENERAL: healthy, alert, no distress, and over weight  EYES: Eyes grossly normal to inspection, PERRL and conjunctivae and sclerae normal  HENT: ear canals and TM's normal, nose and mouth without ulcers or lesions  NECK: no adenopathy, no asymmetry, masses, or scars and thyroid normal to palpation  RESP: lungs clear to auscultation - no rales, rhonchi or wheezes  CV: regular rate and rhythm, normal S1 S2, no S3 or S4, no murmur, click or rub, no peripheral edema and peripheral pulses strong  ABDOMEN: soft, nontender, no hepatosplenomegaly, no masses and bowel sounds normal  MS: no gross musculoskeletal defects noted, no edema  NEURO: Normal strength and tone, mentation intact and speech normal  PSYCH: mentation appears normal, affect normal/bright  LYMPH: no cervical, supraclavicular, axillary, or inguinal adenopathy    Diagnostic Test Results:  Labs reviewed in Epic    ASSESSMENT/PLAN:       ICD-10-CM    1. Routine general medical examination at a " health care facility  Z00.00       2. Encounter for other contraceptive management  Z30.8 etonogestrel-ethinyl estradiol (NUVARING) 0.12-0.015 MG/24HR vaginal ring      3. Gastroesophageal reflux disease without esophagitis  K21.9 omeprazole (PRILOSEC) 20 MG DR capsule      4. Anxiety  F41.9 venlafaxine (EFFEXOR XR) 75 MG 24 hr capsule      5. Systemic lupus erythematosus, unspecified SLE type, unspecified organ involvement status (H)  M32.9 ibuprofen (ADVIL/MOTRIN) 800 MG tablet     CBC with platelets     CRP, inflammation     ESR: Erythrocyte sedimentation rate          Patient has been advised of split billing requirements and indicates understanding: Yes  1. Routine general medical examination at a health care facility      2. Encounter for other contraceptive management  Nuvaring helpful with periods. The current medical regimen is effective;  continue present plan and medications.   - etonogestrel-ethinyl estradiol (NUVARING) 0.12-0.015 MG/24HR vaginal ring; Place 1 each vaginally every 28 days  Dispense: 3 each; Refill: 2    3. Gastroesophageal reflux disease without esophagitis  Chronic, stable. The current medical regimen is effective;  continue present plan and medications.   - omeprazole (PRILOSEC) 20 MG DR capsule; Take 1 capsule (20 mg) by mouth once daily. Take 30-60 minutes before a meal.  Dispense: 90 capsule; Refill: 4    4. Anxiety  Chronic, stable, The current medical regimen is effective;  continue present plan and medications.   - venlafaxine (EFFEXOR XR) 75 MG 24 hr capsule; Take 3 capsules (225 mg) by mouth once daily.  Dispense: 270 capsule; Refill: 4    5. Systemic lupus erythematosus, unspecified SLE type, unspecified organ involvement status (H)  Chronic, followed by rheumatology, Dr. Araiza. She is having increased symptoms over the last 1-2 weeks which are improving. Will check CBC, sed rate and CRP. Has follow up with Dr. Araiza in 2 weeks.  - ibuprofen (ADVIL/MOTRIN) 800 MG tablet; Take 1  tablet (800 mg) by mouth every 8 hours as needed for moderate pain  Dispense: 90 tablet; Refill: 3  - CBC with platelets; Future  - CRP, inflammation; Future  - ESR: Erythrocyte sedimentation rate; Future  - ESR: Erythrocyte sedimentation rate  - CRP, inflammation  - CBC with platelets        COUNSELING:  Reviewed preventive health counseling, as reflected in patient instructions       Regular exercise       Healthy diet/nutrition       Vision screening       Immunizations  Declined: Covid-19, Influenza, and Pneumococcal due to Concerns about side effects/safety             Contraception        She reports that she quit smoking about 10 years ago. Her smoking use included cigarettes. She has never used smokeless tobacco.          Richard Small MD  [facility]

## 2023-11-28 ENCOUNTER — MYC MEDICAL ADVICE (OUTPATIENT)
Dept: FAMILY MEDICINE | Facility: CLINIC | Age: 43
End: 2023-11-28
Payer: COMMERCIAL

## 2023-12-05 ENCOUNTER — MYC MEDICAL ADVICE (OUTPATIENT)
Dept: FAMILY MEDICINE | Facility: CLINIC | Age: 43
End: 2023-12-05
Payer: COMMERCIAL

## 2023-12-05 DIAGNOSIS — B96.89 BV (BACTERIAL VAGINOSIS): Primary | ICD-10-CM

## 2023-12-05 DIAGNOSIS — N76.0 BV (BACTERIAL VAGINOSIS): Primary | ICD-10-CM

## 2023-12-06 RX ORDER — METRONIDAZOLE 500 MG/1
500 TABLET ORAL 2 TIMES DAILY
Qty: 14 TABLET | Refills: 0 | Status: SHIPPED | OUTPATIENT
Start: 2023-12-06 | End: 2023-12-13

## 2024-01-31 DIAGNOSIS — F90.2 ATTENTION DEFICIT HYPERACTIVITY DISORDER (ADHD), COMBINED TYPE: ICD-10-CM

## 2024-02-01 RX ORDER — DEXTROAMPHETAMINE SACCHARATE, AMPHETAMINE ASPARTATE MONOHYDRATE, DEXTROAMPHETAMINE SULFATE AND AMPHETAMINE SULFATE 7.5; 7.5; 7.5; 7.5 MG/1; MG/1; MG/1; MG/1
CAPSULE, EXTENDED RELEASE ORAL
Qty: 30 CAPSULE | Refills: 0 | OUTPATIENT
Start: 2024-02-01

## 2024-02-01 RX ORDER — DEXTROAMPHETAMINE SACCHARATE, AMPHETAMINE ASPARTATE MONOHYDRATE, DEXTROAMPHETAMINE SULFATE AND AMPHETAMINE SULFATE 7.5; 7.5; 7.5; 7.5 MG/1; MG/1; MG/1; MG/1
30 CAPSULE, EXTENDED RELEASE ORAL DAILY
Qty: 30 CAPSULE | Refills: 0 | Status: SHIPPED | OUTPATIENT
Start: 2024-03-03 | End: 2024-04-02

## 2024-02-01 RX ORDER — DEXTROAMPHETAMINE SACCHARATE, AMPHETAMINE ASPARTATE MONOHYDRATE, DEXTROAMPHETAMINE SULFATE AND AMPHETAMINE SULFATE 7.5; 7.5; 7.5; 7.5 MG/1; MG/1; MG/1; MG/1
30 CAPSULE, EXTENDED RELEASE ORAL DAILY
Qty: 30 CAPSULE | Refills: 0 | Status: SHIPPED | OUTPATIENT
Start: 2024-04-03 | End: 2024-05-03

## 2024-02-01 RX ORDER — DEXTROAMPHETAMINE SACCHARATE, AMPHETAMINE ASPARTATE MONOHYDRATE, DEXTROAMPHETAMINE SULFATE AND AMPHETAMINE SULFATE 7.5; 7.5; 7.5; 7.5 MG/1; MG/1; MG/1; MG/1
30 CAPSULE, EXTENDED RELEASE ORAL DAILY
Qty: 30 CAPSULE | Refills: 0 | Status: SHIPPED | OUTPATIENT
Start: 2024-02-01 | End: 2024-03-02

## 2024-03-05 DIAGNOSIS — F41.9 ANXIETY: ICD-10-CM

## 2024-03-05 RX ORDER — ALPRAZOLAM 0.5 MG
0.5 TABLET ORAL PRN
Qty: 30 TABLET | Refills: 0 | Status: SHIPPED | OUTPATIENT
Start: 2024-03-05 | End: 2024-04-24

## 2024-04-24 ENCOUNTER — VIRTUAL VISIT (OUTPATIENT)
Dept: FAMILY MEDICINE | Facility: CLINIC | Age: 44
End: 2024-04-24
Payer: COMMERCIAL

## 2024-04-24 DIAGNOSIS — F41.9 ANXIETY: ICD-10-CM

## 2024-04-24 PROCEDURE — 99214 OFFICE O/P EST MOD 30 MIN: CPT | Mod: 95 | Performed by: FAMILY MEDICINE

## 2024-04-24 RX ORDER — ALPRAZOLAM 0.5 MG
0.5 TABLET ORAL 3 TIMES DAILY PRN
Qty: 90 TABLET | Refills: 0 | Status: SHIPPED | OUTPATIENT
Start: 2024-04-24

## 2024-04-24 ASSESSMENT — ANXIETY QUESTIONNAIRES
1. FEELING NERVOUS, ANXIOUS, OR ON EDGE: MORE THAN HALF THE DAYS
IF YOU CHECKED OFF ANY PROBLEMS ON THIS QUESTIONNAIRE, HOW DIFFICULT HAVE THESE PROBLEMS MADE IT FOR YOU TO DO YOUR WORK, TAKE CARE OF THINGS AT HOME, OR GET ALONG WITH OTHER PEOPLE: VERY DIFFICULT
7. FEELING AFRAID AS IF SOMETHING AWFUL MIGHT HAPPEN: SEVERAL DAYS
2. NOT BEING ABLE TO STOP OR CONTROL WORRYING: NEARLY EVERY DAY
GAD7 TOTAL SCORE: 18
8. IF YOU CHECKED OFF ANY PROBLEMS, HOW DIFFICULT HAVE THESE MADE IT FOR YOU TO DO YOUR WORK, TAKE CARE OF THINGS AT HOME, OR GET ALONG WITH OTHER PEOPLE?: VERY DIFFICULT
GAD7 TOTAL SCORE: 18
4. TROUBLE RELAXING: NEARLY EVERY DAY
7. FEELING AFRAID AS IF SOMETHING AWFUL MIGHT HAPPEN: SEVERAL DAYS
GAD7 TOTAL SCORE: 18
6. BECOMING EASILY ANNOYED OR IRRITABLE: NEARLY EVERY DAY
5. BEING SO RESTLESS THAT IT IS HARD TO SIT STILL: NEARLY EVERY DAY
3. WORRYING TOO MUCH ABOUT DIFFERENT THINGS: NEARLY EVERY DAY

## 2024-04-24 ASSESSMENT — PATIENT HEALTH QUESTIONNAIRE - PHQ9
SUM OF ALL RESPONSES TO PHQ QUESTIONS 1-9: 14
10. IF YOU CHECKED OFF ANY PROBLEMS, HOW DIFFICULT HAVE THESE PROBLEMS MADE IT FOR YOU TO DO YOUR WORK, TAKE CARE OF THINGS AT HOME, OR GET ALONG WITH OTHER PEOPLE: VERY DIFFICULT
SUM OF ALL RESPONSES TO PHQ QUESTIONS 1-9: 14

## 2024-04-24 NOTE — PROGRESS NOTES
Answers submitted by the patient for this visit:  Patient Health Questionnaire (Submitted on 4/24/2024)  If you checked off any problems, how difficult have these problems made it for you to do your work, take care of things at home, or get along with other people?: Very difficult  PHQ9 TOTAL SCORE: 14  JET-7 (Submitted on 4/24/2024)  JET 7 TOTAL SCORE: 18  Depression / Anxiety Questionnaire (Submitted on 4/24/2024)  Chief Complaint: Chronic problems general questions HPI Form  Depression/Anxiety: Anxiety  Anxiety only (Submitted on 4/24/2024)  Chief Complaint: Chronic problems general questions HPI Form  Anxiety since last: : worse  Other associated symotome: : YesJimena is a 44 year old who is being evaluated via a billable video visit.    How would you like to obtain your AVS? MyChart  If the video visit is dropped, the invitation should be resent by: Text to cell phone: [phone]  Will anyone else be joining your video visit? 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  She is currently on Effexor 225 mg once daily.  She takes Xanax 0.5 mg once daily as needed for anxiety and panic.    The event at work had to do with a coworker suicide attempt.  The details of her involvement in the event are not clear but are being investigated by HR.  This has become very stressful for Jimena and she is not sleeping well and her thoughts are racing.    She is on the current max dose of Effexor and a low dose of the Xanax.  I expect that the HR process will resolve in the next 2 to 4 weeks.  Will increase her Xanax to 0.5 mg up to 3 times daily as needed.  And she will follow-up with me in 1 month.  - ALPRAZolam (XANAX) 0.5 MG tablet; Take 1 tablet (0.5 mg) by mouth 3 times daily as needed for anxiety  - PRIMARY CARE FOLLOW-UP SCHEDULING;  "Future    Prescription drug management        BMI  Estimated body mass index is 35.08 kg/m  as calculated from the following:    Height as of 11/20/23: 1.64 m (5' 4.57\").    Weight as of 11/20/23: 94.3 kg (208 lb).          Follow-up Visit   Expected date:  May 24, 2024 (Approximate)      Follow Up Appointment Details:     Follow-up with whom?: Me    Follow-Up for what?: Chronic Disease f/u    Chronic Disease f/u: Anxiety    How?: Inge Hess is a 44 year old, presenting for the following health issues:  No chief complaint on file.        11/20/2023     1:44 PM   Additional Questions   Roomed by Randal PATEL     History of Present Illness       Mental Health Follow-up:  Patient presents to follow-up on Anxiety.    Patient's anxiety since last visit has been:  Worse  The patient is having other symptoms associated with anxiety.  Any significant life events: job concerns and grief or loss  Patient is feeling anxious or having panic attacks.  Patient has no concerns about alcohol or drug use.    Reason for visit:  Medication question    She eats 0-1 servings of fruits and vegetables daily.She consumes 0 sweetened beverage(s) daily.She exercises with enough effort to increase her heart rate 20 to 29 minutes per day.  She exercises with enough effort to increase her heart rate 3 or less days per week.   She is taking medications regularly.       Depression and Anxiety   How are you doing with your depression since your last visit? Worsened   How are you doing with your anxiety since your last visit?  Worsened   Are you having other symptoms that might be associated with depression or anxiety? No  Have you had a significant life event? Job Concerns   Do you have any concerns with your use of alcohol or other drugs? No    Social History     Tobacco Use    Smoking status: Former     Current packs/day: 0.00     Types: Cigarettes     Quit date: 6/19/2013     Years since quitting: 10.8    " Smokeless tobacco: Never    Tobacco comments:     social   Vaping Use    Vaping status: Never Used   Substance Use Topics    Alcohol use: Yes     Comment: occ    Drug use: No         9/15/2022    12:55 PM 8/17/2023    12:40 PM 4/24/2024    10:25 AM   PHQ   PHQ-9 Total Score 3 5 14   Q9: Thoughts of better off dead/self-harm past 2 weeks Not at all Not at all Not at all         6/17/2021     3:38 PM 10/22/2021     4:02 PM 4/24/2024    10:29 AM   JET-7 SCORE   Total Score   18 (severe anxiety)   Total Score 5 5 18         4/24/2024    10:25 AM   Last PHQ-9   1.  Little interest or pleasure in doing things 1   2.  Feeling down, depressed, or hopeless 2   3.  Trouble falling or staying asleep, or sleeping too much 1   4.  Feeling tired or having little energy 2   5.  Poor appetite or overeating 1   6.  Feeling bad about yourself 2   7.  Trouble concentrating 3   8.  Moving slowly or restless 2   Q9: Thoughts of better off dead/self-harm past 2 weeks 0   PHQ-9 Total Score 14         4/24/2024    10:29 AM   JET-7    1. Feeling nervous, anxious, or on edge 2   2. Not being able to stop or control worrying 3   3. Worrying too much about different things 3   4. Trouble relaxing 3   5. Being so restless that it is hard to sit still 3   6. Becoming easily annoyed or irritable 3   7. Feeling afraid, as if something awful might happen 1   JET-7 Total Score 18   If you checked any problems, how difficult have they made it for you to do your work, take care of things at home, or get along with other people? Very difficult       Patient Active Problem List   Diagnosis    CARDIOVASCULAR SCREENING; LDL GOAL LESS THAN 160    SLE (systemic lupus erythematosus) (H)    Sicca (H24)    High risk medication use    Anxiety    Generalized anxiety disorder    Attention deficit hyperactivity disorder (ADHD)    Major depressive disorder, recurrent episode, moderate (H)    Screening for cervical cancer           Review of Systems  CONSTITUTIONAL:  NEGATIVE for fever, chills, change in weight  ENT/MOUTH: NEGATIVE for ear, mouth and throat problems  RESP: NEGATIVE for significant cough or SOB  CV: NEGATIVE for chest pain, palpitations or peripheral edema  PSYCHIATRIC: POSITIVE forconcentration difficulty, depressed mood, Hx anxiety, Hx depression, Hx panic attacks, insomnia, obsessive thoughts, panic attack, and ADHD and NEGATIVE foragitation, hallucinations, and thoughts of self harm  ROS otherwise negative      Objective           Vitals:  No vitals were obtained today due to virtual visit.    Physical Exam   GENERAL: alert and no distress  EYES: Eyes grossly normal to inspection.  No discharge or erythema, or obvious scleral/conjunctival abnormalities.  RESP: No audible wheeze, cough, or visible cyanosis.    SKIN: Visible skin clear. No significant rash, abnormal pigmentation or lesions.  NEURO: Cranial nerves grossly intact.  Mentation and speech appropriate for age.  PSYCH: Appropriate affect, tone, and pace of words          Video-Visit Details    Type of service:  Video Visit   Originating Location (pt. Location): Home    Distant Location (provider location):  On-site  Platform used for Video Visit: [facility]  Signed Electronically by: Richard Small MD    Significant life event: : job concerns, grief or loss  Anxious:: Yes  Current substance use:: No  General Questionnaire (Submitted on 4/24/2024)  Chief Complaint: Chronic problems general questions HPI Form  What is the reason for your visit today? : Medication question  How many servings of fruits and vegetables do you eat daily?: 0-1  On average, how many sweetened beverages do you drink each day (Examples: soda, juice, sweet tea, etc.  Do NOT count diet or artificially sweetened beverages)?: 0  How many minutes a day do you exercise enough to make your heart beat faster?: 20 to 29  How many days a week do you exercise enough to make your heart beat faster?: 3 or less  How many days per week do you miss  taking your medication?: 0

## 2024-05-21 DIAGNOSIS — F90.2 ATTENTION DEFICIT HYPERACTIVITY DISORDER (ADHD), COMBINED TYPE: ICD-10-CM

## 2024-05-22 RX ORDER — DEXTROAMPHETAMINE SACCHARATE, AMPHETAMINE ASPARTATE MONOHYDRATE, DEXTROAMPHETAMINE SULFATE AND AMPHETAMINE SULFATE 7.5; 7.5; 7.5; 7.5 MG/1; MG/1; MG/1; MG/1
30 CAPSULE, EXTENDED RELEASE ORAL DAILY
Qty: 30 CAPSULE | Refills: 0 | Status: SHIPPED | OUTPATIENT
Start: 2024-06-21 | End: 2024-07-21

## 2024-05-22 RX ORDER — DEXTROAMPHETAMINE SACCHARATE, AMPHETAMINE ASPARTATE MONOHYDRATE, DEXTROAMPHETAMINE SULFATE AND AMPHETAMINE SULFATE 7.5; 7.5; 7.5; 7.5 MG/1; MG/1; MG/1; MG/1
CAPSULE, EXTENDED RELEASE ORAL
Qty: 30 CAPSULE | Refills: 0 | OUTPATIENT
Start: 2024-05-22

## 2024-05-22 RX ORDER — DEXTROAMPHETAMINE SACCHARATE, AMPHETAMINE ASPARTATE MONOHYDRATE, DEXTROAMPHETAMINE SULFATE AND AMPHETAMINE SULFATE 7.5; 7.5; 7.5; 7.5 MG/1; MG/1; MG/1; MG/1
30 CAPSULE, EXTENDED RELEASE ORAL DAILY
Qty: 30 CAPSULE | Refills: 0 | Status: SHIPPED | OUTPATIENT
Start: 2024-05-22 | End: 2024-06-21

## 2024-05-22 RX ORDER — DEXTROAMPHETAMINE SACCHARATE, AMPHETAMINE ASPARTATE MONOHYDRATE, DEXTROAMPHETAMINE SULFATE AND AMPHETAMINE SULFATE 7.5; 7.5; 7.5; 7.5 MG/1; MG/1; MG/1; MG/1
30 CAPSULE, EXTENDED RELEASE ORAL DAILY
Qty: 30 CAPSULE | Refills: 0 | Status: SHIPPED | OUTPATIENT
Start: 2024-07-21 | End: 2024-09-11

## 2024-05-24 ENCOUNTER — VIRTUAL VISIT (OUTPATIENT)
Dept: FAMILY MEDICINE | Facility: CLINIC | Age: 44
End: 2024-05-24
Attending: FAMILY MEDICINE
Payer: COMMERCIAL

## 2024-05-24 DIAGNOSIS — M32.9 SYSTEMIC LUPUS ERYTHEMATOSUS, UNSPECIFIED SLE TYPE, UNSPECIFIED ORGAN INVOLVEMENT STATUS (H): ICD-10-CM

## 2024-05-24 DIAGNOSIS — F33.1 MAJOR DEPRESSIVE DISORDER, RECURRENT EPISODE, MODERATE (H): ICD-10-CM

## 2024-05-24 DIAGNOSIS — F41.9 ANXIETY: ICD-10-CM

## 2024-05-24 PROCEDURE — 96127 BRIEF EMOTIONAL/BEHAV ASSMT: CPT | Mod: 95 | Performed by: FAMILY MEDICINE

## 2024-05-24 PROCEDURE — G2211 COMPLEX E/M VISIT ADD ON: HCPCS | Mod: 95 | Performed by: FAMILY MEDICINE

## 2024-05-24 PROCEDURE — 99214 OFFICE O/P EST MOD 30 MIN: CPT | Mod: 95 | Performed by: FAMILY MEDICINE

## 2024-05-24 ASSESSMENT — ENCOUNTER SYMPTOMS: NERVOUS/ANXIOUS: 1

## 2024-05-24 ASSESSMENT — ANXIETY QUESTIONNAIRES
3. WORRYING TOO MUCH ABOUT DIFFERENT THINGS: NOT AT ALL
1. FEELING NERVOUS, ANXIOUS, OR ON EDGE: SEVERAL DAYS
8. IF YOU CHECKED OFF ANY PROBLEMS, HOW DIFFICULT HAVE THESE MADE IT FOR YOU TO DO YOUR WORK, TAKE CARE OF THINGS AT HOME, OR GET ALONG WITH OTHER PEOPLE?: SOMEWHAT DIFFICULT
7. FEELING AFRAID AS IF SOMETHING AWFUL MIGHT HAPPEN: NOT AT ALL
2. NOT BEING ABLE TO STOP OR CONTROL WORRYING: SEVERAL DAYS
GAD7 TOTAL SCORE: 2
GAD7 TOTAL SCORE: 2
5. BEING SO RESTLESS THAT IT IS HARD TO SIT STILL: NOT AT ALL
GAD7 TOTAL SCORE: 2
IF YOU CHECKED OFF ANY PROBLEMS ON THIS QUESTIONNAIRE, HOW DIFFICULT HAVE THESE PROBLEMS MADE IT FOR YOU TO DO YOUR WORK, TAKE CARE OF THINGS AT HOME, OR GET ALONG WITH OTHER PEOPLE: SOMEWHAT DIFFICULT
6. BECOMING EASILY ANNOYED OR IRRITABLE: NOT AT ALL
4. TROUBLE RELAXING: NOT AT ALL

## 2024-05-24 NOTE — PROGRESS NOTES
"    Instructions Relayed to Patient by Virtual Roomer:     Patient is active on [employer]:   Relayed following to patient: \"It looks like you are active on [employer], are you able to join the visit this way? If not, do you need us to send you a link now or would you like your provider to send a link via text or email when they are ready to initiate the visit?\"      Patient Confirmed they will join visit via: [employer]  Reminded patient to ensure they were logged on to virtual visit by arrival time listed.   Asked if patient has flexibility to initiate visit sooner than arrival time: patient is unable to initiate visit earlier than arrival time     If pediatric virtual visit, ensured pediatric patient along with parent/guardian will be present for video visit.     Patient offered the website [email or web address] and/or phone number to [employer] Help line: [phone]    Jimena is a 44 year old who is being evaluated via a billable video visit.    How would you like to obtain your AVS? [employer]  If the video visit is dropped, the invitation should be resent by: Text to cell phone: [phone]  Will anyone else be joining your video visit? No      Assessment & Plan     Anxiety  Chronic, improved with resolution of HR issue. The current medical regimen is effective;  continue present plan and medications. Will reduce Xanax to once daily as needed.  - PRIMARY CARE FOLLOW-UP SCHEDULING    Major depressive disorder, recurrent episode, moderate (H)  Chronic, stable. The current medical regimen is effective;  continue present plan and medications.     Systemic lupus erythematosus, unspecified SLE type, unspecified organ involvement status (H)  Chronic, stable. The current medical regimen is effective;  continue present plan and medications.           BMI  Estimated body mass index is 35.08 kg/m  as calculated from the following:    Height as of 11/20/23: 1.64 m (5' 4.57\").    Weight as of 11/20/23: 94.3 kg (208 lb). "   Weight management plan: Discussed healthy diet and exercise guidelines      FUTURE APPOINTMENTS:       - Follow-up for annual visit or as needed    Sheyla Hess is a 44 year old, presenting for the following health issues:  Recheck Medication and Anxiety        5/24/2024     9:52 AM   Additional Questions   Roomed by Reinier PEREIRA     Anxiety    History of Present Illness       Mental Health Follow-up:  Patient presents to follow-up on Anxiety.    Patient's anxiety since last visit has been:  Better  The patient is not having other symptoms associated with anxiety.  Any significant life events: other  Patient is not feeling anxious or having panic attacks.  Patient has no concerns about alcohol or drug use.        Patient Active Problem List   Diagnosis    CARDIOVASCULAR SCREENING; LDL GOAL LESS THAN 160    SLE (systemic lupus erythematosus) (H)    Sicca (H24)    High risk medication use    Anxiety    Generalized anxiety disorder    Attention deficit hyperactivity disorder (ADHD)    Major depressive disorder, recurrent episode, moderate (H)    Screening for cervical cancer     Current Outpatient Medications   Medication Sig Dispense Refill    ALPRAZolam (XANAX) 0.5 MG tablet Take 1 tablet (0.5 mg) by mouth 3 times daily as needed for anxiety 90 tablet 0    amphetamine-dextroamphetamine (ADDERALL XR) 30 MG 24 hr capsule Take 1 capsule (30 mg) by mouth daily for 30 days 30 capsule 0    [START ON 6/21/2024] amphetamine-dextroamphetamine (ADDERALL XR) 30 MG 24 hr capsule Take 1 capsule (30 mg) by mouth daily for 30 days 30 capsule 0    [START ON 7/21/2024] amphetamine-dextroamphetamine (ADDERALL XR) 30 MG 24 hr capsule Take 1 capsule (30 mg) by mouth daily for 30 days 30 capsule 0    cetirizine (ZYRTEC) 10 MG tablet Take 10 mg by mouth daily      etonogestrel-ethinyl estradiol (NUVARING) 0.12-0.015 MG/24HR vaginal ring Place 1 each vaginally every 28 days 3 each 2    hydroxychloroquine (PLAQUENIL) 200 MG tablet Take  2 tablets (400 mg) by mouth once daily. 180 tablet 4    ibuprofen (ADVIL/MOTRIN) 800 MG tablet Take 1 tablet (800 mg) by mouth every 8 hours as needed for moderate pain 90 tablet 3    multivitamin w/minerals (THERA-VIT-M) tablet Take 1 tablet by mouth daily      omeprazole (PRILOSEC) 20 MG DR capsule Take 1 capsule (20 mg) by mouth once daily. Take 30-60 minutes before a meal. 90 capsule 4    triamcinolone (KENALOG) 0.1 % external cream Apply sparingly to affected area two times daily as needed 453.6 g 0    venlafaxine (EFFEXOR XR) 75 MG 24 hr capsule Take 3 capsules (225 mg) by mouth once daily. 270 capsule 4       Anxiety   How are you doing with your anxiety since your last visit? Improved   Are you having other symptoms that might be associated with anxiety? No  Have you had a significant life event? No   Are you feeling depressed? No  Do you have any concerns with your use of alcohol or other drugs? No    Social History     Tobacco Use    Smoking status: Former     Current packs/day: 0.00     Types: Cigarettes     Quit date: 6/19/2013     Years since quitting: 10.9    Smokeless tobacco: Never    Tobacco comments:     social   Vaping Use    Vaping status: Never Used   Substance Use Topics    Alcohol use: Yes     Comment: occ    Drug use: No         10/22/2021     4:02 PM 4/24/2024    10:29 AM 5/24/2024     9:55 AM   JET-7 SCORE   Total Score  18 (severe anxiety) 2 (minimal anxiety)   Total Score 5 18 2         9/15/2022    12:55 PM 8/17/2023    12:40 PM 4/24/2024    10:25 AM   PHQ   PHQ-9 Total Score 3 5 14   Q9: Thoughts of better off dead/self-harm past 2 weeks Not at all Not at all Not at all         4/24/2024    10:25 AM   Last PHQ-9   1.  Little interest or pleasure in doing things 1   2.  Feeling down, depressed, or hopeless 2   3.  Trouble falling or staying asleep, or sleeping too much 1   4.  Feeling tired or having little energy 2   5.  Poor appetite or overeating 1   6.  Feeling bad about yourself 2    7.  Trouble concentrating 3   8.  Moving slowly or restless 2   Q9: Thoughts of better off dead/self-harm past 2 weeks 0   PHQ-9 Total Score 14         5/24/2024     9:55 AM   JET-7    1. Feeling nervous, anxious, or on edge 1   2. Not being able to stop or control worrying 1   3. Worrying too much about different things 0   4. Trouble relaxing 0   5. Being so restless that it is hard to sit still 0   6. Becoming easily annoyed or irritable 0   7. Feeling afraid, as if something awful might happen 0   JET-7 Total Score 2   If you checked any problems, how difficult have they made it for you to do your work, take care of things at home, or get along with other people? Somewhat difficult       Review of Systems  CONSTITUTIONAL: NEGATIVE for fever, chills, change in weight  ENT/MOUTH: NEGATIVE for ear, mouth and throat problems  RESP: NEGATIVE for significant cough or SOB  CV: NEGATIVE for chest pain, palpitations or peripheral edema  PSYCHIATRIC: POSITIVE for Hx anxiety, Hx depression, and Hx panic attacks and NEGATIVE forthoughts of hurting someone else and thoughts of self harm  ROS otherwise negative      Objective           Vitals:  No vitals were obtained today due to virtual visit.    Physical Exam   GENERAL: alert and no distress  EYES: Eyes grossly normal to inspection.  No discharge or erythema, or obvious scleral/conjunctival abnormalities.  RESP: No audible wheeze, cough, or visible cyanosis.    SKIN: Visible skin clear. No significant rash, abnormal pigmentation or lesions.  NEURO: Cranial nerves grossly intact.  Mentation and speech appropriate for age.  PSYCH: Appropriate affect, tone, and pace of words          Video-Visit Details    Type of service:  Video Visit   Originating Location (pt. Location): Home    Distant Location (provider location):  On-site  Platform used for Video Visit: Harshad  Signed Electronically by: Richard Small MD

## 2024-06-17 ENCOUNTER — PATIENT OUTREACH (OUTPATIENT)
Dept: CARE COORDINATION | Facility: CLINIC | Age: 44
End: 2024-06-17
Payer: COMMERCIAL

## 2024-07-19 ENCOUNTER — HOSPITAL ENCOUNTER (OUTPATIENT)
Dept: MAMMOGRAPHY | Facility: CLINIC | Age: 44
Discharge: HOME | End: 2024-07-19
Attending: FAMILY MEDICINE | Admitting: FAMILY MEDICINE
Payer: COMMERCIAL

## 2024-07-19 DIAGNOSIS — Z12.31 VISIT FOR SCREENING MAMMOGRAM: ICD-10-CM

## 2024-07-19 PROCEDURE — 77063 BREAST TOMOSYNTHESIS BI: CPT

## 2024-09-10 DIAGNOSIS — Z30.8 ENCOUNTER FOR OTHER CONTRACEPTIVE MANAGEMENT: ICD-10-CM

## 2024-09-10 DIAGNOSIS — F90.2 ATTENTION DEFICIT HYPERACTIVITY DISORDER (ADHD), COMBINED TYPE: ICD-10-CM

## 2024-09-11 RX ORDER — DEXTROAMPHETAMINE SACCHARATE, AMPHETAMINE ASPARTATE MONOHYDRATE, DEXTROAMPHETAMINE SULFATE AND AMPHETAMINE SULFATE 7.5; 7.5; 7.5; 7.5 MG/1; MG/1; MG/1; MG/1
CAPSULE, EXTENDED RELEASE ORAL
Qty: 30 CAPSULE | Refills: 0 | Status: SHIPPED | OUTPATIENT
Start: 2024-09-11

## 2024-09-11 RX ORDER — ETONOGESTREL AND ETHINYL ESTRADIOL VAGINAL RING .015; .12 MG/D; MG/D
RING VAGINAL
Qty: 3 EACH | Refills: 2 | Status: SHIPPED | OUTPATIENT
Start: 2024-09-11

## 2024-09-13 ENCOUNTER — TRANSCRIBE ORDERS (OUTPATIENT)
Dept: OTHER | Age: 44
End: 2024-09-13

## 2024-09-13 DIAGNOSIS — M54.16 LUMBAR BACK PAIN WITH RADICULOPATHY AFFECTING LOWER EXTREMITY: Primary | ICD-10-CM

## 2024-10-04 ENCOUNTER — THERAPY VISIT (OUTPATIENT)
Dept: PHYSICAL THERAPY | Facility: CLINIC | Age: 44
End: 2024-10-04
Attending: PHYSICIAN ASSISTANT
Payer: OTHER MISCELLANEOUS

## 2024-10-04 DIAGNOSIS — M54.16 LUMBAR BACK PAIN WITH RADICULOPATHY AFFECTING LOWER EXTREMITY: Primary | ICD-10-CM

## 2024-10-04 PROCEDURE — 97140 MANUAL THERAPY 1/> REGIONS: CPT | Mod: GP | Performed by: PHYSICAL THERAPIST

## 2024-10-04 PROCEDURE — 97110 THERAPEUTIC EXERCISES: CPT | Mod: GP | Performed by: PHYSICAL THERAPIST

## 2024-10-04 PROCEDURE — 97162 PT EVAL MOD COMPLEX 30 MIN: CPT | Mod: GP | Performed by: PHYSICAL THERAPIST

## 2024-10-04 NOTE — PROGRESS NOTES
PHYSICAL THERAPY EVALUATION  Type of Visit: Evaluation       Fall Risk Screen:  Fall screen completed by: PT  Have you fallen 2 or more times in the past year?: No  Have you fallen and had an injury in the past year?: No  Is patient a fall risk?: No    Subjective       Presenting condition or subjective complaint: Numbness and discomfort in low back/buttock into legs, work injury 07/26/2024, had been going numb in L glute over past year and team member [facility] was told to get her a new chair from an ergonomic assessment. Pain progressed to the point that she had difficulty getting out of her chair 7/26/24. Turned into R leg numbness and tingling to R lower leg.  Date of onset: 07/26/24    Relevant medical history: DVT (blood clot); Overweight; Pain at night or rest   Dates & types of surgery: Cholecystectomy 2011    Prior diagnostic imaging/testing results: X-ray     Prior therapy history for the same diagnosis, illness or injury: No      Prior Level of Function  Transfers:   Ambulation:   ADL:   IADL:     Living Environment  Social support: With a significant other or spouse   Type of home: House   Stairs to enter the home: Yes 3 Is there a railing: Yes     Ramp: No   Stairs inside the home: Yes 20 Is there a railing: Yes     Help at home: None  Equipment owned:       Employment: Yes Care Coordinator  Hobbies/Interests: Outdoors, classic cars, [employer]    Patient goals for therapy: Sit and stand and sleep comfortably    Pain assessment: Pain present  See objective evaluation for additional pain details     Objective   LUMBAR SPINE EVALUATION  PAIN: Pain Level at Rest: 4/10  Pain Level with Use: 4/10  Pain Location: Currently R glute>L, across B low back  Pain Quality: Aching, Numb, Throbbing, Tingling, and fatigued; can be sharp in R glute with sitting  Pain Frequency: intermittent  Pain is Worst: depends on position vs time of day  Pain is Exacerbated By: prolonged sitting, previously going up stairs  Pain is Relieved  By: cold, heat, and prednisone  Pain Progression: Improved (was worsening prior to starting prednisone, improved after prednisone)    POSTURE: Standing Posture: Sway back  Sitting Posture: Rounded shoulders, Lordosis decreased  GAIT:   Weightbearing Status: WBAT  Assistive Device(s): None  Gait Deviations: WNL    ROM: Lumbar AROM: Flex to mid lower leg ++pull R leg and R side of low back, Ext min limitation, SB to knee B, Rotation WNL/EQ B;   Hamstring length: L lacking 15, R lacking 30    STRENGTH:  Hip Flex L 4+/5, R 3/5    MYOTOMES:  all WNL/EQ B when tested in sitting    NEURAL TENSION:  (-) Slump  FLEXIBILITY: Decreased piriformis L, Decreased hamstrings L, Decreased piriformis R, Decreased hamstrings R    PELVIS/SI SPECIAL TESTS:    Left Right Additional Notes   ASLR (-) (+)    JOSEFINA (-) (+)    Gaenslen's Test      Supine to sit (-) (?) Only mild change   Pelvic Gapping (-) (-)    Thigh Thrust      Pelvic Compression      Sacral Thrust        SIJ Palpatory    March test (+) R   Standing forward bend (first/farthest superior movement, note side) Right   ASIS  Low on R   Medial Malleoli level   GREGORIO    Pubic symphysis Superior:  Inferior:            Assessment & Plan   CLINICAL IMPRESSIONS  Medical Diagnosis: Lumbar back pain with radiculopathy affecting lower extremity    Treatment Diagnosis: Lumbar back pain with radiculopathy affecting lower extremity   Impression/Assessment: Patient is a 44 year old female with low back and leg complaints.  The following significant findings have been identified: Pain, Decreased ROM/flexibility, Decreased joint mobility, Decreased strength, Decreased activity tolerance, and Impaired posture. These impairments interfere with their ability to perform work tasks, recreational activities, household chores, driving , household mobility, and community mobility as compared to previous level of function.     Clinical Decision Making (Complexity):  Clinical Presentation:  Evolving/Changing  Clinical Presentation Rationale: based on medical and personal factors listed in PT evaluation  Clinical Decision Making (Complexity): Moderate complexity    PLAN OF CARE  Treatment Interventions:  Modalities: E-stim, cupping  Interventions: Manual Therapy, Neuromuscular Re-education, Therapeutic Activity, Therapeutic Exercise, Self-Care/Home Management    Long Term Goals     PT Goal 1  Goal Identifier: Sitting  Goal Description: patient will be able to sit as needed throughout the day at work w/o radicular sx or pain in low back or glutes  Rationale: to maximize safety and independence with performance of ADLs and functional tasks;to maximize safety and independence within the home;to maximize safety and independence within the community;to maximize safety and independence with self cares  Target Date: 12/27/24      Frequency of Treatment: 2x/month  Duration of Treatment: x3 months    Recommended Referrals to Other Professionals:  none  Education Assessment:   Learner/Method: Patient;Listening;Reading;Demonstration;Pictures/Video;No Barriers to Learning    Risks and benefits of evaluation/treatment have been explained.   Patient/Family/caregiver agrees with Plan of Care.     Evaluation Time:     PT Eval, Moderate Complexity Minutes (55943): 18       Signing Clinician: Amanda Hilligoss, PT

## 2024-10-15 ENCOUNTER — THERAPY VISIT (OUTPATIENT)
Dept: PHYSICAL THERAPY | Facility: CLINIC | Age: 44
End: 2024-10-15
Attending: PHYSICIAN ASSISTANT
Payer: OTHER MISCELLANEOUS

## 2024-10-15 DIAGNOSIS — M54.16 LUMBAR BACK PAIN WITH RADICULOPATHY AFFECTING LOWER EXTREMITY: Primary | ICD-10-CM

## 2024-10-15 PROCEDURE — 97110 THERAPEUTIC EXERCISES: CPT | Mod: GP | Performed by: PHYSICAL THERAPY ASSISTANT

## 2024-10-15 PROCEDURE — 97140 MANUAL THERAPY 1/> REGIONS: CPT | Mod: GP | Performed by: PHYSICAL THERAPY ASSISTANT

## 2024-10-29 ENCOUNTER — THERAPY VISIT (OUTPATIENT)
Dept: PHYSICAL THERAPY | Facility: CLINIC | Age: 44
End: 2024-10-29
Payer: OTHER MISCELLANEOUS

## 2024-10-29 DIAGNOSIS — F90.2 ATTENTION DEFICIT HYPERACTIVITY DISORDER (ADHD), COMBINED TYPE: ICD-10-CM

## 2024-10-29 DIAGNOSIS — M54.16 LUMBAR BACK PAIN WITH RADICULOPATHY AFFECTING LOWER EXTREMITY: Primary | ICD-10-CM

## 2024-10-29 PROCEDURE — 97140 MANUAL THERAPY 1/> REGIONS: CPT | Mod: GP | Performed by: PHYSICAL THERAPY ASSISTANT

## 2024-10-29 PROCEDURE — 97110 THERAPEUTIC EXERCISES: CPT | Mod: GP | Performed by: PHYSICAL THERAPY ASSISTANT

## 2024-10-29 RX ORDER — DEXTROAMPHETAMINE SULFATE, DEXTROAMPHETAMINE SACCHARATE, AMPHETAMINE SULFATE AND AMPHETAMINE ASPARTATE 7.5; 7.5; 7.5; 7.5 MG/1; MG/1; MG/1; MG/1
CAPSULE, EXTENDED RELEASE ORAL
Qty: 30 CAPSULE | Refills: 0 | Status: SHIPPED | OUTPATIENT
Start: 2024-10-29

## 2024-11-11 ENCOUNTER — THERAPY VISIT (OUTPATIENT)
Dept: PHYSICAL THERAPY | Facility: CLINIC | Age: 44
End: 2024-11-11
Payer: OTHER MISCELLANEOUS

## 2024-11-11 DIAGNOSIS — M54.16 LUMBAR BACK PAIN WITH RADICULOPATHY AFFECTING LOWER EXTREMITY: Primary | ICD-10-CM

## 2024-11-11 PROCEDURE — 97110 THERAPEUTIC EXERCISES: CPT | Mod: GP | Performed by: PHYSICAL THERAPIST

## 2024-11-11 PROCEDURE — 97140 MANUAL THERAPY 1/> REGIONS: CPT | Mod: GP | Performed by: PHYSICAL THERAPIST

## 2024-11-11 NOTE — PROGRESS NOTES
DISCHARGE  Reason for Discharge: Patient is progressing towards all goals and working towards independent sx management w/ HEP.    Equipment Issued: none    Discharge Plan: Patient to continue home program.    Referring Provider:  Jaymie Baird       11/11/24 0500   Appointment Info   Signing clinician's name / credentials Amanda Hilligoss, DPT, PRPC   Total/Authorized Visits 6 (E&T)   Visits Used 4   Medical Diagnosis Lumbar back pain with radiculopathy affecting lower extremity   PT Tx Diagnosis Lumbar back pain with radiculopathy affecting lower extremity   Progress Note/Certification   Onset of illness/injury or Date of Surgery 07/26/24   Therapy Frequency 2x/month   Predicted Duration x3 months   Progress Note Due Date 12/02/24   Progress Note Completed Date 10/04/24   Supervision   PT Assistant Visit Number 2   PT Goal 1   Goal Identifier Sitting   Goal Description patient will be able to sit as needed throughout the day at work w/o radicular sx or pain in low back or glutes   Rationale to maximize safety and independence with performance of ADLs and functional tasks;to maximize safety and independence within the home;to maximize safety and independence within the community;to maximize safety and independence with self cares   Goal Progress Pt able to sit throughout work day as needed w/ pain 2-3/10 (progressing towards goal, will work on independent sx management w/ HEP)   Target Date 12/27/24   Subjective Report   Subjective Report Pt notes she was doing more sitting over the past week and noticed increase in pain, especially if not sitting in her primary work chair, but feels that overall pain has been much less intense. Performing HEP intermittently (2-4x/week), and feels stretches are helpful. Has been primarily focusing on gluteal fascial arc combo exercise, not usually doing the full routine. Feels she is ready to trial independent sx management w/ HEP   Objective Measures   Objective Measures  "Objective Measure 1;Objective Measure 2   Objective Measure 1   Objective Measure LROM   Details flex reach just above ankles, ext slight loss with tension, R/L SB WNL   Objective Measure 2   Objective Measure SI tests   Treatment Interventions (PT)   Interventions Therapeutic Procedure/Exercise;Neuromuscular Re-education;Manual Therapy   Therapeutic Procedure/Exercise   Therapeutic Procedures: strength, endurance, ROM, flexibility minutes (83463) 24   Therapeutic Procedures Ther Proc 2;Ther Proc 3;Ther Proc 4   Ther Proc 1 Piriformis stretch   Ther Proc 1 - Details >90 x 30\" B   Ther Proc 2 Hamstring stretch   Ther Proc 2 - Details HEP   Ther Proc 3 myofascial glut series #1-5 x 5 reps each B   Ther Proc 3 - Details repositioning often, also discussed importance of freqency of exercises or other physical activity   Skilled Intervention verbal review   Patient Response/Progress improved posture awareness, good tolerance to exercise   Ther Proc 4 Independent HEP   Ther Proc 4 - Details 10 min spent on edu re: importance of regular exercise routine and frequently stretching throughout the day vs waiting until pain occurs   Manual Therapy   Manual Therapy: Mobilization, MFR, MLD, friction massage minutes (41170) 14   Manual Therapy Manual Therapy 2   Manual Therapy 1 TPR/MFR   Manual Therapy 1 - Details L Glute med, piriformis, TFL and along SI joint x 14 min   Skilled Intervention selection of appropriate technique based on objective findings and patient response   Patient Response/Progress decreased tension and tenderness   Education   Learner/Method Patient;Listening;Reading;Demonstration;Pictures/Video;No Barriers to Learning   Plan   Home program PTRx initiated and added to phone   Updates to plan of care DC to HEP   Total Session Time   Timed Code Treatment Minutes 38   Total Treatment Time (sum of timed and untimed services) 38       "

## 2024-11-20 SDOH — HEALTH STABILITY: PHYSICAL HEALTH: ON AVERAGE, HOW MANY DAYS PER WEEK DO YOU ENGAGE IN MODERATE TO STRENUOUS EXERCISE (LIKE A BRISK WALK)?: 3 DAYS

## 2024-11-20 SDOH — HEALTH STABILITY: PHYSICAL HEALTH: ON AVERAGE, HOW MANY MINUTES DO YOU ENGAGE IN EXERCISE AT THIS LEVEL?: 20 MIN

## 2024-11-20 ASSESSMENT — SOCIAL DETERMINANTS OF HEALTH (SDOH): HOW OFTEN DO YOU GET TOGETHER WITH FRIENDS OR RELATIVES?: THREE TIMES A WEEK

## 2024-11-22 ENCOUNTER — OFFICE VISIT (OUTPATIENT)
Dept: FAMILY MEDICINE | Facility: CLINIC | Age: 44
End: 2024-11-22
Attending: FAMILY MEDICINE
Payer: COMMERCIAL

## 2024-11-22 VITALS
SYSTOLIC BLOOD PRESSURE: 132 MMHG | DIASTOLIC BLOOD PRESSURE: 82 MMHG | TEMPERATURE: 97.7 F | BODY MASS INDEX: 36.54 KG/M2 | WEIGHT: 214 LBS | HEIGHT: 64 IN | HEART RATE: 115 BPM | OXYGEN SATURATION: 98 %

## 2024-11-22 DIAGNOSIS — M32.9 SYSTEMIC LUPUS ERYTHEMATOSUS, UNSPECIFIED SLE TYPE, UNSPECIFIED ORGAN INVOLVEMENT STATUS (H): ICD-10-CM

## 2024-11-22 DIAGNOSIS — Z00.00 ROUTINE GENERAL MEDICAL EXAMINATION AT A HEALTH CARE FACILITY: Primary | ICD-10-CM

## 2024-11-22 DIAGNOSIS — F41.9 ANXIETY: ICD-10-CM

## 2024-11-22 DIAGNOSIS — L40.9 PSORIASIS: ICD-10-CM

## 2024-11-22 DIAGNOSIS — F90.2 ATTENTION DEFICIT HYPERACTIVITY DISORDER (ADHD), COMBINED TYPE: ICD-10-CM

## 2024-11-22 DIAGNOSIS — Z30.8 ENCOUNTER FOR OTHER CONTRACEPTIVE MANAGEMENT: ICD-10-CM

## 2024-11-22 DIAGNOSIS — K21.9 GASTROESOPHAGEAL REFLUX DISEASE WITHOUT ESOPHAGITIS: ICD-10-CM

## 2024-11-22 PROCEDURE — 99396 PREV VISIT EST AGE 40-64: CPT | Performed by: FAMILY MEDICINE

## 2024-11-22 PROCEDURE — 96127 BRIEF EMOTIONAL/BEHAV ASSMT: CPT | Performed by: FAMILY MEDICINE

## 2024-11-22 PROCEDURE — 99214 OFFICE O/P EST MOD 30 MIN: CPT | Mod: 25 | Performed by: FAMILY MEDICINE

## 2024-11-22 RX ORDER — DEXTROAMPHETAMINE SACCHARATE, AMPHETAMINE ASPARTATE MONOHYDRATE, DEXTROAMPHETAMINE SULFATE AND AMPHETAMINE SULFATE 7.5; 7.5; 7.5; 7.5 MG/1; MG/1; MG/1; MG/1
30 CAPSULE, EXTENDED RELEASE ORAL DAILY
Qty: 30 CAPSULE | Refills: 0 | Status: SHIPPED | OUTPATIENT
Start: 2025-01-21 | End: 2025-02-20

## 2024-11-22 RX ORDER — MULTIPLE VITAMINS W/ MINERALS TAB 9MG-400MCG
1 TAB ORAL DAILY
COMMUNITY
Start: 2024-11-22

## 2024-11-22 RX ORDER — DEXTROAMPHETAMINE SACCHARATE, AMPHETAMINE ASPARTATE MONOHYDRATE, DEXTROAMPHETAMINE SULFATE AND AMPHETAMINE SULFATE 7.5; 7.5; 7.5; 7.5 MG/1; MG/1; MG/1; MG/1
30 CAPSULE, EXTENDED RELEASE ORAL DAILY
Qty: 30 CAPSULE | Refills: 0 | Status: SHIPPED | OUTPATIENT
Start: 2024-11-22 | End: 2024-12-22

## 2024-11-22 RX ORDER — DEXTROAMPHETAMINE SACCHARATE, AMPHETAMINE ASPARTATE MONOHYDRATE, DEXTROAMPHETAMINE SULFATE AND AMPHETAMINE SULFATE 7.5; 7.5; 7.5; 7.5 MG/1; MG/1; MG/1; MG/1
30 CAPSULE, EXTENDED RELEASE ORAL DAILY
Qty: 30 CAPSULE | Refills: 0 | Status: SHIPPED | OUTPATIENT
Start: 2024-12-22 | End: 2025-01-21

## 2024-11-22 RX ORDER — ETONOGESTREL AND ETHINYL ESTRADIOL VAGINAL RING .015; .12 MG/D; MG/D
RING VAGINAL
Qty: 3 EACH | Refills: 11 | Status: SHIPPED | OUTPATIENT
Start: 2024-11-22

## 2024-11-22 RX ORDER — IBUPROFEN 800 MG/1
800 TABLET, FILM COATED ORAL EVERY 8 HOURS PRN
Qty: 90 TABLET | Refills: 3 | Status: SHIPPED | OUTPATIENT
Start: 2024-11-22

## 2024-11-22 RX ORDER — TRIAMCINOLONE ACETONIDE 1 MG/G
CREAM TOPICAL
Qty: 30 G | Refills: 0 | Status: SHIPPED | OUTPATIENT
Start: 2024-11-22

## 2024-11-22 RX ORDER — VENLAFAXINE HYDROCHLORIDE 75 MG/1
CAPSULE, EXTENDED RELEASE ORAL
Qty: 270 CAPSULE | Refills: 4 | Status: SHIPPED | OUTPATIENT
Start: 2024-11-22

## 2024-11-22 ASSESSMENT — PATIENT HEALTH QUESTIONNAIRE - PHQ9
SUM OF ALL RESPONSES TO PHQ QUESTIONS 1-9: 5
10. IF YOU CHECKED OFF ANY PROBLEMS, HOW DIFFICULT HAVE THESE PROBLEMS MADE IT FOR YOU TO DO YOUR WORK, TAKE CARE OF THINGS AT HOME, OR GET ALONG WITH OTHER PEOPLE: NOT DIFFICULT AT ALL
SUM OF ALL RESPONSES TO PHQ QUESTIONS 1-9: 5

## 2024-11-22 ASSESSMENT — PAIN SCALES - GENERAL: PAINLEVEL_OUTOF10: NO PAIN (0)

## 2024-11-22 NOTE — PROGRESS NOTES
Preventive Care Visit  [facility]  Richard Small MD, Family Medicine  Nov 22, 2024      Assessment & Plan     Routine general medical examination at a health care facility  Jimena Moreira is a 44-year-old female who presents to clinic for her annual preventive visit.  Past medical history is primarily pertinent for adult ADHD, anxiety, systemic lupus erythematosus, psoriasis and obesity.  She has struggled with her weight over the years but has recommitted herself to weight loss through [facility] and has dropped 15 pounds in the last 3 months.  Overall her mood is good and she identifies that her health is improving.    All age and gender specific screening recommendations were reviewed today.  Her last Pap smear was in 2022 which was normal with a negative HPV she has had a previous abnormal several years ago and is on a 3-year screening protocol.  Her mammogram was completed in July 2024 and was normal with recommendation to continue annual exams.  There is no family history for colon cancer or inflammatory bowel disease.  We discussed recommendations for initiating colon cancer screening at age 45.    All vaccine recommendations and schedules were reviewed.  She declines influenza vaccine and COVID-vaccine.  She is current with her tetanus vaccine as of 2020.  We discussed risks and benefits of seasonal respiratory viruses and vaccines.  She will consider vaccination through her outpatient pharmacy if her opinion changes.    On exam this is a pleasant overweight middle-aged female in no acute distress.  Her blood pressure 132/82.  Her pulse is 110 and regular.  She is afebrile.  Respiratory rate is 12 with oxygen saturations of 98% on room air.    HEENT exam: Normocephalic atraumatic.  Pupils are equally round and reactive to light and accommodation, extraocular muscles are intact.  TMs are clear bilaterally.  Nasopharynx is clear.  Oropharynx is clear mucosas moist tongue  projects midline and palate raises symmetrically.  Neck is supple without adenopathy, thyromegaly or carotid bruit.  Lungs are clear to auscultation without wheezing rales or rhonchi.  Cardiac exam is regular without murmur rub or gallop.  Abdomen is soft, nontender, nondistended with good bowel sounds.  There is no appreciable organomegaly.  Extremities no clubbing cyanosis or edema.  Skin exam she has a chronic erythematous annular patch from her lupus on her right anterior shin.  This has been stable for over a year.  She continues to use topical steroids for this.  Neurologic exam is nonfocal.  She is alert and oriented x 3.  Cranial nerves II through XII are fully intact without deficit.  Deep tendon reflexes symmetrical in the upper and lower extremities without clonus.    Mood and affect are good.      Assessment: 44-year-old female who presents to clinic for her annual preventive visit.  In general she identifies that her health is good.  She is current and compliant with all recommended screening.  She declines COVID and influenza vaccine today.  She does have history of fibromyalgia and has [facility] paperwork which is been signed on an annual basis for the last 5 years.  This was reauthorized again and will need to be reauthorized in another year.    Plan: Continue current medications and plans.  All medications were refilled for another year.  She will return to clinic in 1 week for fasting lipid and metabolic profile.  I asked her to establish primary care with one of my partners in 6 months after my [facility].  She will need an annual wellness visit again in 1 year.    - multivitamin w/minerals (THERA-VIT-M) tablet; Take 1 tablet by mouth daily.  - Basic metabolic panel  (Ca, Cl, CO2, Creat, Gluc, K, Na, BUN); Future    Attention deficit hyperactivity disorder (ADHD), combined type  Chronic, stable.  She has been on Adderall XR 30 mg once daily with good benefit for daily function.  She has had no side  effects or abuse.  Authorize additional refills for another 3 months.  This will be authorized again for her in 3 more months which she will need to follow-up with a new provider to establish care in 6 months.  - amphetamine-dextroamphetamine (ADDERALL XR) 30 MG 24 hr capsule; Take 1 capsule (30 mg) by mouth daily.  - amphetamine-dextroamphetamine (ADDERALL XR) 30 MG 24 hr capsule; Take 1 capsule (30 mg) by mouth daily.  - amphetamine-dextroamphetamine (ADDERALL XR) 30 MG 24 hr capsule; Take 1 capsule (30 mg) by mouth daily.    Anxiety  Chronic, stable.  She is currently on Effexor 225 mg once daily.  This happens works well for her.  Continue current medication and plan.  - venlafaxine (EFFEXOR XR) 75 MG 24 hr capsule; Take 3 capsules (225 mg) by mouth once daily.    Systemic lupus erythematosus, unspecified SLE type, unspecified organ involvement status (H)  Chronic, stable.  She has seen rheumatology in the past and is currently on Plaquenil 400 mg daily and uses ibuprofen intermittently for arthralgias.  Continue current medication and plan.  - ibuprofen (ADVIL/MOTRIN) 800 MG tablet; Take 1 tablet (800 mg) by mouth every 8 hours as needed for moderate pain.    Psoriasis  Chronic, stable.  She has seen dermatology in the past.  Continue current medication and plan.  - triamcinolone (KENALOG) 0.1 % external cream; Apply sparingly to affected area two times daily as needed    Gastroesophageal reflux disease without esophagitis  Chronic, stable.  This is asymptomatic when she takes her proton pump inhibitor.  Continue current medication and plan.  - omeprazole (PRILOSEC) 20 MG DR capsule; Take 1 capsule (20 mg) by mouth once daily. Take 30-60 minutes before a meal.    Encounter for other contraceptive management  She is to use nova ring successfully and effectively over the last 5 years.  Continue current medication plan.  - etonogestrel-ethinyl estradiol (NUVARING) 0.12-0.015 MG/24HR vaginal ring; Insert one (1)  "ring vaginally and leave in place for 3 consecutive weeks (21 days), then remove for 1 week.    Patient has been advised of split billing requirements and indicates understanding: Yes        BMI  Estimated body mass index is 36.31 kg/m  as calculated from the following:    Height as of this encounter: 1.635 m (5' 4.37\").    Weight as of this encounter: 97.1 kg (214 lb).   Weight management plan: Discussed healthy diet and exercise guidelines Specific weight management program called [facility] discussed    Counseling  Appropriate preventive services were addressed with this patient via screening, questionnaire, or discussion as appropriate for fall prevention, nutrition, physical activity, Tobacco-use cessation, social engagement, weight loss and cognition.  Checklist reviewing preventive services available has been given to the patient.  Reviewed patient's diet, addressing concerns and/or questions.   She is at risk for lack of exercise and has been provided with information to increase physical activity for the benefit of her well-being.   She is at risk for psychosocial distress and has been provided with information to reduce risk.   The patient's PHQ-9 score is consistent with mild depression. She was provided with information regarding depression.       FUTURE LABS:       - Schedule a fasting blood draw 1-2 weeks  FUTURE APPOINTMENTS:       - Follow-up visit in 6 months to establish primary care after my care home  SELF MONITORING:       - Weights weekly  Work on weight loss  Regular exercise   Follow-up Visit   Expected date:  Nov 22, 2025 (Approximate)      Follow Up Appointment Details:     Follow-up with whom?: PCP    Follow-Up for what?: Adult Preventive    How?: In Person                 Sheyla Hess is a 44 year old, presenting for the following:  Physical        11/22/2024    12:48 PM   Additional Questions   Roomed by Elyssa KOLB      Depression and Anxiety   How are you doing with " your depression since your last visit? No change  How are you doing with your anxiety since your last visit?  No change  Are you having other symptoms that might be associated with depression or anxiety? No  Have you had a significant life event? No   Do you have any concerns with your use of alcohol or other drugs? No    Social History     Tobacco Use    Smoking status: Former     Current packs/day: 0.00     Types: Cigarettes     Quit date: 2013     Years since quittin.4    Smokeless tobacco: Never    Tobacco comments:     social   Vaping Use    Vaping status: Never Used   Substance Use Topics    Alcohol use: Yes     Comment: occ    Drug use: No         2023    12:40 PM 2024    10:25 AM 2024    12:34 PM   PHQ   PHQ-9 Total Score 5 14 5    Q9: Thoughts of better off dead/self-harm past 2 weeks Not at all  Not at all  Not at all        Patient-reported         10/22/2021     4:02 PM 2024    10:29 AM 2024     9:55 AM   JET-7 SCORE   Total Score  18 (severe anxiety) 2 (minimal anxiety)   Total Score 5 18 2         2024    12:34 PM   Last PHQ-9   1.  Little interest or pleasure in doing things 1    2.  Feeling down, depressed, or hopeless 1    3.  Trouble falling or staying asleep, or sleeping too much 1    4.  Feeling tired or having little energy 1    5.  Poor appetite or overeating 0    6.  Feeling bad about yourself 1    7.  Trouble concentrating 0    8.  Moving slowly or restless 0    Q9: Thoughts of better off dead/self-harm past 2 weeks 0    PHQ-9 Total Score 5        Patient-reported         2024     9:55 AM   JET-7    1. Feeling nervous, anxious, or on edge 1    2. Not being able to stop or control worrying 1    3. Worrying too much about different things 0    4. Trouble relaxing 0    5. Being so restless that it is hard to sit still 0    6. Becoming easily annoyed or irritable 0    7. Feeling afraid, as if something awful might happen 0    JET-7 Total Score 2   If  you checked any problems, how difficult have they made it for you to do your work, take care of things at home, or get along with other people? Somewhat difficult        Patient-reported       Suicide Assessment Five-step Evaluation and Treatment (SAFE-T)      Health Care Directive  Patient does not have a Health Care Directive: Patient states has Advance Directive and will bring in a copy to clinic.      11/20/2024   General Health   How would you rate your overall physical health? (!) FAIR   Feel stress (tense, anxious, or unable to sleep) To some extent      (!) STRESS CONCERN      11/20/2024   Nutrition   Three or more servings of calcium each day? Yes   Diet: Regular (no restrictions)   How many servings of fruit and vegetables per day? (!) 2-3   How many sweetened beverages each day? 0-1            11/20/2024   Exercise   Days per week of moderate/strenous exercise 3 days   Average minutes spent exercising at this level 20 min            11/20/2024   Social Factors   Frequency of gathering with friends or relatives Three times a week   Worry food won't last until get money to buy more No   Food not last or not have enough money for food? No   Do you have housing? (Housing is defined as stable permanent housing and does not include staying ouside in a car, in a tent, in an abandoned building, in an overnight shelter, or couch-surfing.) Yes   Are you worried about losing your housing? No   Lack of transportation? No   Unable to get utilities (heat,electricity)? No            11/20/2024   Dental   Dentist two times every year? Yes            11/20/2024   TB Screening   Were you born outside of the [place]? No          Today's PHQ-9 Score:       11/22/2024    12:34 PM   PHQ-9 SCORE   PHQ-9 Total Score MyChart 5 (Mild depression)   PHQ-9 Total Score 5        Patient-reported         11/20/2024   Substance Use   Alcohol more than 3/day or more than 7/wk No   Do you use any other substances recreationally? No         Social History     Tobacco Use    Smoking status: Former     Current packs/day: 0.00     Types: Cigarettes     Quit date: 2013     Years since quittin.4    Smokeless tobacco: Never    Tobacco comments:     social   Vaping Use    Vaping status: Never Used   Substance Use Topics    Alcohol use: Yes     Comment: occ    Drug use: No           2024   LAST FHS-7 RESULTS   1st degree relative breast or ovarian cancer Yes   Any relative bilateral breast cancer No   Any male have breast cancer No   Any ONE woman have BOTH breast AND ovarian cancer No   Any woman with breast cancer before 50yrs No   2 or more relatives with breast AND/OR ovarian cancer Yes   2 or more relatives with breast AND/OR bowel cancer Yes           Mammogram Screening - Mammogram every 1-2 years updated in Health Maintenance based on mutual decision making        2024   STI Screening   New sexual partner(s) since last STI/HIV test? No        History of abnormal Pap smear: No - age 30- 64 PAP with HPV every 3 years recommended        Latest Ref Rng & Units 11/3/2022     2:56 PM 2017    12:00 AM 9/10/2014    12:00 AM   PAP / HPV   PAP  Negative for Intraepithelial Lesion or Malignancy (NILM)      PAP (Historical)    OTHER-NIL, See Result    HPV 16 DNA Negative Negative      HPV 18 DNA Negative Negative      Other HR HPV Negative Negative      PAP-ABSTRACT   See Scanned Document            This result is from an external source.     ASCVD Risk   The 10-year ASCVD risk score (Catalina MOORE, et al., 2019) is: 0.4%    Values used to calculate the score:      Age: 44 years      Sex: Female      Is Non- : No      Diabetic: No      Tobacco smoker: No      Systolic Blood Pressure: 132 mmHg      Is BP treated: No      HDL Cholesterol: 70 mg/dL      Total Cholesterol: 178 mg/dL        2024   Contraception/Family Planning   Questions about contraception or family planning No           Reviewed and  updated as needed this visit by Provider                    Lab work is in process  Labs reviewed in EPIC  BP Readings from Last 3 Encounters:   24 132/82   23 128/78   23 138/72    Wt Readings from Last 3 Encounters:   24 97.1 kg (214 lb)   23 94.3 kg (208 lb)   23 91.7 kg (202 lb 4 oz)                  Patient Active Problem List   Diagnosis    CARDIOVASCULAR SCREENING; LDL GOAL LESS THAN 160    SLE (systemic lupus erythematosus) (H)    Sicca (H)    High risk medication use    Anxiety    Generalized anxiety disorder    Attention deficit hyperactivity disorder (ADHD)    Major depressive disorder, recurrent episode, moderate (H)    Screening for cervical cancer    Lumbar back pain with radiculopathy affecting lower extremity    Psoriasis     Past Surgical History:   Procedure Laterality Date    BIOPSY      CHOLECYSTECTOMY         Social History     Tobacco Use    Smoking status: Former     Current packs/day: 0.00     Types: Cigarettes     Quit date: 2013     Years since quittin.4    Smokeless tobacco: Never    Tobacco comments:     social   Substance Use Topics    Alcohol use: Yes     Comment: occ     Family History   Problem Relation Age of Onset    Hypertension Father     Breast Cancer Mother         age 41    Other Cancer Maternal Grandfather 87        lymphoma - late onset    Diabetes Maternal Grandfather     Cancer Paternal Aunt     Cardiovascular Paternal Uncle         CHF,  in sleep    Hypertension Brother         hypertension, Etoh use    Hyperlipidemia Brother     Depression Brother     Substance Abuse Brother     Asthma Brother     Breast Cancer Paternal Grandmother         X4  at age 94    Prostate Cancer Paternal Grandfather     Other Cancer Other         ovarian cancer passed  age 37    Anxiety Disorder Sister     Mental Illness Other     Mental Illness Cousin     Colon Cancer No family hx of          Current Outpatient Medications   Medication  Sig Dispense Refill    ADDERALL XR 30 MG 24 hr capsule Take 1 capsule (30 mg) by mouth once daily. 30 capsule 0    ALPRAZolam (XANAX) 0.5 MG tablet Take 1 tablet (0.5 mg) by mouth 3 times daily as needed for anxiety 90 tablet 0    cetirizine (ZYRTEC) 10 MG tablet Take 10 mg by mouth daily      etonogestrel-ethinyl estradiol (NUVARING) 0.12-0.015 MG/24HR vaginal ring Insert one ring vaginally, leave in place as directed, remove old ring and repeat with new ring every 28 days. 3 each 2    hydroxychloroquine (PLAQUENIL) 200 MG tablet Take 2 tablets (400 mg) by mouth once daily. 180 tablet 4    ibuprofen (ADVIL/MOTRIN) 800 MG tablet Take 1 tablet (800 mg) by mouth every 8 hours as needed for moderate pain 90 tablet 3    multivitamin w/minerals (THERA-VIT-M) tablet Take 1 tablet by mouth daily      omeprazole (PRILOSEC) 20 MG DR capsule Take 1 capsule (20 mg) by mouth once daily. Take 30-60 minutes before a meal. 90 capsule 4    triamcinolone (KENALOG) 0.1 % external cream Apply sparingly to affected area two times daily as needed 453.6 g 0    venlafaxine (EFFEXOR XR) 75 MG 24 hr capsule Take 3 capsules (225 mg) by mouth once daily. 270 capsule 4     Allergies   Allergen Reactions    Nickel Chloride  [Nickel] Dermatitis    Zoloft [Sertraline] Anxiety     Cliches jaw, Increased anxiety     Recent Labs   Lab Test 11/03/22  1525 09/30/20  1042 09/23/19  1118   LDL 85  --   --    HDL 70  --   --    TRIG 115  --   --    ALT 26 30 34   CR 0.70 0.75 0.74   GFRESTIMATED >90 >90 >90   GFRESTBLACK  --  >90 >90   POTASSIUM 3.8 4.1 4.0   TSH 2.28  --  2.66          Review of Systems  CONSTITUTIONAL: NEGATIVE for fever, chills, change in weight  ENT/MOUTH: NEGATIVE for ear, mouth and throat problems  RESP: NEGATIVE for significant cough or SOB  CV: NEGATIVE for chest pain, palpitations or peripheral edema  ROS otherwise negative     Objective    Exam  /82   Pulse 115   Temp 97.7  F (36.5  C) (Temporal)   Ht 1.635 m (5'  "4.37\")   Wt 97.1 kg (214 lb)   LMP 10/15/2024   SpO2 98%   BMI 36.31 kg/m     Estimated body mass index is 36.31 kg/m  as calculated from the following:    Height as of this encounter: 1.635 m (5' 4.37\").    Weight as of this encounter: 97.1 kg (214 lb).    Physical Exam  GENERAL: alert, no distress, and over weight  EYES: Eyes grossly normal to inspection, PERRL and conjunctivae and sclerae normal  HENT: ear canals and TM's normal, nose and mouth without ulcers or lesions  NECK: no adenopathy, no asymmetry, masses, or scars  RESP: lungs clear to auscultation - no rales, rhonchi or wheezes  CV: regular rate and rhythm, normal S1 S2, no S3 or S4, no murmur, click or rub, no peripheral edema  ABDOMEN: soft, nontender, no hepatosplenomegaly, no masses and bowel sounds normal  MS: no gross musculoskeletal defects noted, no edema  SKIN: no suspicious lesions or rashes and lichenification - lower legs  NEURO: Normal strength and tone, mentation intact and speech normal  PSYCH: mentation appears normal, affect normal/bright  LYMPH: no cervical, supraclavicular, axillary, or inguinal adenopathy        Signed Electronically by: Richard Small MD    Answers submitted by the patient for this visit:  Patient Health Questionnaire (Submitted on 11/22/2024)  If you checked off any problems, how difficult have these problems made it for you to do your work, take care of things at home, or get along with other people?: Not difficult at all  PHQ9 TOTAL SCORE: 5    "

## 2025-02-21 ENCOUNTER — MYC MEDICAL ADVICE (OUTPATIENT)
Dept: FAMILY MEDICINE | Facility: CLINIC | Age: 45
End: 2025-02-21
Payer: COMMERCIAL

## 2025-02-21 DIAGNOSIS — F41.9 ANXIETY: ICD-10-CM

## 2025-02-21 DIAGNOSIS — F90.2 ATTENTION DEFICIT HYPERACTIVITY DISORDER (ADHD), COMBINED TYPE: ICD-10-CM

## 2025-02-21 RX ORDER — DEXTROAMPHETAMINE SACCHARATE, AMPHETAMINE ASPARTATE MONOHYDRATE, DEXTROAMPHETAMINE SULFATE AND AMPHETAMINE SULFATE 7.5; 7.5; 7.5; 7.5 MG/1; MG/1; MG/1; MG/1
30 CAPSULE, EXTENDED RELEASE ORAL EVERY MORNING
Qty: 30 CAPSULE | Refills: 0 | Status: SHIPPED | OUTPATIENT
Start: 2025-02-21

## 2025-02-21 RX ORDER — ALPRAZOLAM 0.5 MG
0.5 TABLET ORAL 3 TIMES DAILY PRN
Qty: 90 TABLET | Refills: 0 | Status: SHIPPED | OUTPATIENT
Start: 2025-02-21

## 2025-02-21 NOTE — LETTER
Richard Small MD  [address] [address]  [phone]  2/21/2025      To: Team Member [facility]  Human Resources  [facility]  [place] [id number]        Re: Reasonable Accommodation Request for Jimena Moreira      Dear Team Member [facility],    I am writing this letter on behalf of my patient, Jimena Moreira, who has been under my care for several years for a diagnosed medical condition that qualifies as a disability under the Americans with Disabilities Act (ADA). Due to this condition, Jimena requires ongoing medical treatment, including regular therapy and medical appointments, which are essential for her health and well-being.  To effectively manage her condition, it is medically necessary for Jimena to maintain her current work schedule that allows her to attend these essential treatment sessions. Disrupting or limiting access to this care could result in a significant decline in her health, which would impact her ability to perform work-related tasks effectively.  As a reasonable accommodation under the ADA, I recommend that Jimena be provided with a work schedule that allows her to continue attending her necessary therapy and medical appointments. Specifically, this includes maintaining her current schedule of working 4 days a week, with 10-hour shifts, and having Wednesdays off to accommodate her ongoing treatment plan.  Should you require any further information or clarification, I am happy to discuss this matter, provided that Jimena has given the necessary consent. Thank you for your time and consideration in accommodating this medical necessity.  Sincerely,      Richard Small MD

## 2025-02-21 NOTE — TELEPHONE ENCOUNTER
**OK TO WAIT FOR DR. BARROSO**  Talked to patient and informed her of Dr. Dominguez's message. Patient states she was just in to see Dr. Barroso and he told her to request her medication prior to him leaving and he will refill her meds for 3 months and then she will establish care with a new provider in June 2025. Dr. Barroso, please review. If you are ok with filling for 3 months please send to [facility] in [place] pharmacy.

## 2025-02-21 NOTE — TELEPHONE ENCOUNTER
Patient will need to establish with a new clinic provider prior to any subsequent refills.    Alberto

## 2025-02-24 RX ORDER — DEXTROAMPHETAMINE SACCHARATE, AMPHETAMINE ASPARTATE MONOHYDRATE, DEXTROAMPHETAMINE SULFATE AND AMPHETAMINE SULFATE 7.5; 7.5; 7.5; 7.5 MG/1; MG/1; MG/1; MG/1
30 CAPSULE, EXTENDED RELEASE ORAL DAILY
Qty: 30 CAPSULE | Refills: 0 | Status: SHIPPED | OUTPATIENT
Start: 2025-03-26 | End: 2025-04-25

## 2025-02-24 RX ORDER — DEXTROAMPHETAMINE SACCHARATE, AMPHETAMINE ASPARTATE MONOHYDRATE, DEXTROAMPHETAMINE SULFATE AND AMPHETAMINE SULFATE 7.5; 7.5; 7.5; 7.5 MG/1; MG/1; MG/1; MG/1
30 CAPSULE, EXTENDED RELEASE ORAL DAILY
Qty: 30 CAPSULE | Refills: 0 | Status: SHIPPED | OUTPATIENT
Start: 2025-04-25 | End: 2025-05-25

## 2025-02-24 RX ORDER — DEXTROAMPHETAMINE SACCHARATE, AMPHETAMINE ASPARTATE MONOHYDRATE, DEXTROAMPHETAMINE SULFATE AND AMPHETAMINE SULFATE 7.5; 7.5; 7.5; 7.5 MG/1; MG/1; MG/1; MG/1
30 CAPSULE, EXTENDED RELEASE ORAL DAILY
Qty: 30 CAPSULE | Refills: 0 | Status: SHIPPED | OUTPATIENT
Start: 2025-02-24 | End: 2025-03-26

## 2025-03-21 ENCOUNTER — MYC MEDICAL ADVICE (OUTPATIENT)
Dept: PHYSICAL THERAPY | Facility: CLINIC | Age: 45
End: 2025-03-21
Payer: COMMERCIAL

## 2025-06-19 ENCOUNTER — OFFICE VISIT (OUTPATIENT)
Dept: FAMILY MEDICINE | Facility: CLINIC | Age: 45
End: 2025-06-19
Payer: COMMERCIAL

## 2025-06-19 VITALS
HEART RATE: 102 BPM | DIASTOLIC BLOOD PRESSURE: 89 MMHG | TEMPERATURE: 97.5 F | OXYGEN SATURATION: 100 % | BODY MASS INDEX: 36.88 KG/M2 | RESPIRATION RATE: 20 BRPM | WEIGHT: 216 LBS | HEIGHT: 64 IN | SYSTOLIC BLOOD PRESSURE: 129 MMHG

## 2025-06-19 DIAGNOSIS — F41.9 ANXIETY: ICD-10-CM

## 2025-06-19 DIAGNOSIS — F33.1 MAJOR DEPRESSIVE DISORDER, RECURRENT EPISODE, MODERATE (H): ICD-10-CM

## 2025-06-19 DIAGNOSIS — L40.9 PSORIASIS: ICD-10-CM

## 2025-06-19 DIAGNOSIS — Z76.89 ENCOUNTER TO ESTABLISH CARE: Primary | ICD-10-CM

## 2025-06-19 DIAGNOSIS — F90.2 ATTENTION DEFICIT HYPERACTIVITY DISORDER (ADHD), COMBINED TYPE: ICD-10-CM

## 2025-06-19 DIAGNOSIS — M32.9 SYSTEMIC LUPUS ERYTHEMATOSUS, UNSPECIFIED SLE TYPE, UNSPECIFIED ORGAN INVOLVEMENT STATUS (H): ICD-10-CM

## 2025-06-19 PROCEDURE — 3079F DIAST BP 80-89 MM HG: CPT | Performed by: PHYSICIAN ASSISTANT

## 2025-06-19 PROCEDURE — 1126F AMNT PAIN NOTED NONE PRSNT: CPT | Performed by: PHYSICIAN ASSISTANT

## 2025-06-19 PROCEDURE — 99214 OFFICE O/P EST MOD 30 MIN: CPT | Performed by: PHYSICIAN ASSISTANT

## 2025-06-19 PROCEDURE — G2211 COMPLEX E/M VISIT ADD ON: HCPCS | Performed by: PHYSICIAN ASSISTANT

## 2025-06-19 PROCEDURE — 3074F SYST BP LT 130 MM HG: CPT | Performed by: PHYSICIAN ASSISTANT

## 2025-06-19 RX ORDER — DEXTROAMPHETAMINE SACCHARATE, AMPHETAMINE ASPARTATE MONOHYDRATE, DEXTROAMPHETAMINE SULFATE AND AMPHETAMINE SULFATE 7.5; 7.5; 7.5; 7.5 MG/1; MG/1; MG/1; MG/1
30 CAPSULE, EXTENDED RELEASE ORAL DAILY
Qty: 30 CAPSULE | Refills: 0 | Status: SHIPPED | OUTPATIENT
Start: 2025-07-19 | End: 2025-08-18

## 2025-06-19 RX ORDER — DEXTROAMPHETAMINE SACCHARATE, AMPHETAMINE ASPARTATE MONOHYDRATE, DEXTROAMPHETAMINE SULFATE AND AMPHETAMINE SULFATE 7.5; 7.5; 7.5; 7.5 MG/1; MG/1; MG/1; MG/1
30 CAPSULE, EXTENDED RELEASE ORAL EVERY MORNING
Qty: 30 CAPSULE | Refills: 0 | Status: CANCELLED | OUTPATIENT
Start: 2025-06-19

## 2025-06-19 RX ORDER — ALPRAZOLAM 0.5 MG
0.5 TABLET ORAL 3 TIMES DAILY PRN
Qty: 90 TABLET | Refills: 0 | Status: SHIPPED | OUTPATIENT
Start: 2025-06-19

## 2025-06-19 RX ORDER — DEXTROAMPHETAMINE SACCHARATE, AMPHETAMINE ASPARTATE MONOHYDRATE, DEXTROAMPHETAMINE SULFATE AND AMPHETAMINE SULFATE 7.5; 7.5; 7.5; 7.5 MG/1; MG/1; MG/1; MG/1
30 CAPSULE, EXTENDED RELEASE ORAL DAILY
Qty: 30 CAPSULE | Refills: 0 | Status: SHIPPED | OUTPATIENT
Start: 2025-06-19 | End: 2025-07-19

## 2025-06-19 RX ORDER — DEXTROAMPHETAMINE SACCHARATE, AMPHETAMINE ASPARTATE MONOHYDRATE, DEXTROAMPHETAMINE SULFATE AND AMPHETAMINE SULFATE 7.5; 7.5; 7.5; 7.5 MG/1; MG/1; MG/1; MG/1
30 CAPSULE, EXTENDED RELEASE ORAL DAILY
Qty: 30 CAPSULE | Refills: 0 | Status: SHIPPED | OUTPATIENT
Start: 2025-08-18 | End: 2025-09-17

## 2025-06-19 ASSESSMENT — PATIENT HEALTH QUESTIONNAIRE - PHQ9
10. IF YOU CHECKED OFF ANY PROBLEMS, HOW DIFFICULT HAVE THESE PROBLEMS MADE IT FOR YOU TO DO YOUR WORK, TAKE CARE OF THINGS AT HOME, OR GET ALONG WITH OTHER PEOPLE: SOMEWHAT DIFFICULT
SUM OF ALL RESPONSES TO PHQ QUESTIONS 1-9: 3
SUM OF ALL RESPONSES TO PHQ QUESTIONS 1-9: 3

## 2025-06-19 ASSESSMENT — PAIN SCALES - GENERAL: PAINLEVEL_OUTOF10: NO PAIN (0)

## 2025-06-19 NOTE — PROGRESS NOTES
"  Sheyla Hess is a 45 year old, presenting for the following health issues:  Establish Care and Recheck Medication        6/19/2025    12:37 PM   Additional Questions   Roomed by Jason     History of Present Illness       Reason for visit:  Establish care, note, review of what is needed, med check   She is taking medications regularly.      Patient presents today to establish care. Previous [facility] patient.     Patient presents today for ADHD follow-up. Symptoms continue to be very well controlled with current regimen. Feels well balanced. Denies any side effects. Sleeping well at night. Moods doing well.     Takes Xanax for her anxiety. This is really the only thing that has been helpful for her along with Venlafaxine. She has been trying to cut back on this a bit when able. Otherwise does feel that moods are stable.     She has Lupus and follows with rheumatology for this. Currently on Plaquenil. Overall stable. Does have FMLA related to this diagnosis however does not need to use often.     Due for yearly and pap in November.     Review of Systems  Constitutional, HEENT, cardiovascular, pulmonary, GI, , musculoskeletal, neuro, skin, endocrine and psych systems are negative, except as otherwise noted.      Objective    /89   Pulse 102   Temp 97.5  F (36.4  C) (Temporal)   Resp 20   Ht 1.633 m (5' 4.29\")   Wt 98 kg (216 lb)   LMP 06/01/2025 (Approximate)   SpO2 100%   BMI 36.74 kg/m    Body mass index is 36.74 kg/m .  Physical Exam   GENERAL: alert and no distress  EYES: Eyes grossly normal to inspection, PERRL and conjunctivae and sclerae normal  HENT: ear canals and TM's normal, nose and mouth without ulcers or lesions  NECK: no adenopathy, no asymmetry, masses, or scars  RESP: lungs clear to auscultation - no rales, rhonchi or wheezes  CV: regular rate and rhythm, normal S1 S2, no S3 or S4, no murmur, click or rub, no peripheral edema  MS: no gross musculoskeletal defects noted, no " edema  SKIN: no suspicious lesions or rashes  NEURO: Normal strength and tone, mentation intact and speech normal  PSYCH: mentation appears normal, affect normal/bright      Assessment & Plan     Encounter to establish care  Health history and plan of care reviewed today.     Attention deficit hyperactivity disorder (ADHD), combined type  Stable. Continue current treatment without change.   - amphetamine-dextroamphetamine (ADDERALL XR) 30 MG 24 hr capsule; Take 1 capsule (30 mg) by mouth daily.  - amphetamine-dextroamphetamine (ADDERALL XR) 30 MG 24 hr capsule; Take 1 capsule (30 mg) by mouth daily.  - amphetamine-dextroamphetamine (ADDERALL XR) 30 MG 24 hr capsule; Take 1 capsule (30 mg) by mouth daily.    Major depressive disorder, recurrent episode, moderate (H)  Stable on current dose of Venlafaxine.     Anxiety  Recommended using sparingly as needed for severe anxiety. Hopefully will be able to slowly decrease with time.   - ALPRAZolam (XANAX) 0.5 MG tablet; Take 1 tablet (0.5 mg) by mouth 3 times daily as needed for anxiety.    Systemic lupus erythematosus, unspecified SLE type, unspecified organ involvement status (H)  Stable overall. Continues to follow with rheumatology.     Psoriasis  Stable overall.     The longitudinal plan of care for the diagnosis(es)/condition(s) as documented were addressed during this visit. Due to the added complexity in care, I will continue to support Jimena in the subsequent management and with ongoing continuity of care.    Signed Electronically by: Elyssa Smalls PA-C

## 2025-07-29 ENCOUNTER — MYC MEDICAL ADVICE (OUTPATIENT)
Dept: FAMILY MEDICINE | Facility: CLINIC | Age: 45
End: 2025-07-29
Payer: COMMERCIAL

## 2025-08-11 ENCOUNTER — MYC MEDICAL ADVICE (OUTPATIENT)
Dept: FAMILY MEDICINE | Facility: CLINIC | Age: 45
End: 2025-08-11
Payer: COMMERCIAL

## 2025-08-26 ENCOUNTER — OFFICE VISIT (OUTPATIENT)
Dept: FAMILY MEDICINE | Facility: CLINIC | Age: 45
End: 2025-08-26
Payer: COMMERCIAL

## 2025-08-26 VITALS
DIASTOLIC BLOOD PRESSURE: 86 MMHG | TEMPERATURE: 97.4 F | OXYGEN SATURATION: 98 % | BODY MASS INDEX: 36.7 KG/M2 | HEIGHT: 64 IN | SYSTOLIC BLOOD PRESSURE: 136 MMHG | WEIGHT: 215 LBS | RESPIRATION RATE: 22 BRPM | HEART RATE: 109 BPM

## 2025-08-26 DIAGNOSIS — R10.12 LUQ ABDOMINAL PAIN: Primary | ICD-10-CM

## 2025-08-26 DIAGNOSIS — K59.00 CONSTIPATION, UNSPECIFIED CONSTIPATION TYPE: ICD-10-CM

## 2025-08-26 LAB
ALBUMIN UR-MCNC: 30 MG/DL
APPEARANCE UR: CLEAR
BACTERIA #/AREA URNS HPF: ABNORMAL /HPF
BILIRUB UR QL STRIP: NEGATIVE
COLOR UR AUTO: YELLOW
GLUCOSE UR STRIP-MCNC: NEGATIVE MG/DL
HGB UR QL STRIP: NEGATIVE
KETONES UR STRIP-MCNC: NEGATIVE MG/DL
LEUKOCYTE ESTERASE UR QL STRIP: NEGATIVE
MUCOUS THREADS #/AREA URNS LPF: PRESENT /LPF
NITRATE UR QL: NEGATIVE
PH UR STRIP: 5.5 [PH] (ref 5–7)
RBC URINE: 6 /HPF
SP GR UR STRIP: 1.03 (ref 1–1.03)
SQUAMOUS EPITHELIAL: 2 /HPF
UROBILINOGEN UR STRIP-MCNC: NORMAL MG/DL
WBC URINE: 0 /HPF

## 2025-08-26 PROCEDURE — 81001 URINALYSIS AUTO W/SCOPE: CPT

## 2025-08-26 PROCEDURE — 3075F SYST BP GE 130 - 139MM HG: CPT

## 2025-08-26 PROCEDURE — 1126F AMNT PAIN NOTED NONE PRSNT: CPT

## 2025-08-26 PROCEDURE — 99214 OFFICE O/P EST MOD 30 MIN: CPT

## 2025-08-26 PROCEDURE — 3079F DIAST BP 80-89 MM HG: CPT

## 2025-08-26 ASSESSMENT — PAIN SCALES - GENERAL: PAINLEVEL_OUTOF10: NO PAIN (0)

## 2025-08-26 ASSESSMENT — ENCOUNTER SYMPTOMS: LEG PAIN: 1
